# Patient Record
Sex: FEMALE | Race: WHITE | Employment: FULL TIME | URBAN - METROPOLITAN AREA
[De-identification: names, ages, dates, MRNs, and addresses within clinical notes are randomized per-mention and may not be internally consistent; named-entity substitution may affect disease eponyms.]

---

## 2017-06-28 LAB
AVERAGE GLUCOSE: NORMAL
BASOPHILS ABSOLUTE: NORMAL /ΜL
BASOPHILS RELATIVE PERCENT: NORMAL %
BILIRUBIN, URINE: NEGATIVE
BLOOD, URINE: NEGATIVE
CHOLESTEROL, TOTAL: 211 MG/DL
CHOLESTEROL/HDL RATIO: ABNORMAL
CLARITY: ABNORMAL
COLOR: ABNORMAL
EOSINOPHILS ABSOLUTE: NORMAL /ΜL
EOSINOPHILS RELATIVE PERCENT: NORMAL %
GLUCOSE BLD-MCNC: 97 MG/DL
GLUCOSE URINE: NEGATIVE
HBA1C MFR BLD: 5.6 %
HCT VFR BLD CALC: 44.8 % (ref 36–46)
HDLC SERPL-MCNC: 71 MG/DL (ref 35–70)
HEMOGLOBIN: 15.1 G/DL (ref 12–16)
KETONES, URINE: NEGATIVE
LDL CHOLESTEROL CALCULATED: 118 MG/DL (ref 0–160)
LEUKOCYTE ESTERASE, URINE: NEGATIVE
LYMPHOCYTES ABSOLUTE: NORMAL /ΜL
LYMPHOCYTES RELATIVE PERCENT: NORMAL %
MCH RBC QN AUTO: 29.9 PG
MCHC RBC AUTO-ENTMCNC: 33.7 G/DL
MCV RBC AUTO: 88.8 FL
MONOCYTES ABSOLUTE: NORMAL /ΜL
MONOCYTES RELATIVE PERCENT: NORMAL %
NEUTROPHILS ABSOLUTE: NORMAL /ΜL
NEUTROPHILS RELATIVE PERCENT: NORMAL %
NITRITE, URINE: NEGATIVE
PDW BLD-RTO: 13.9 %
PH UA: 5.5 (ref 4.5–8)
PLATELET # BLD: 228 K/ΜL
PMV BLD AUTO: NORMAL FL
PROTEIN UA: POSITIVE
RBC # BLD: 5 10^6/ΜL
SPECIFIC GRAVITY, URINE: 1.03
TRIGL SERPL-MCNC: 108 MG/DL
UROBILINOGEN, URINE: NORMAL
VLDLC SERPL CALC-MCNC: 22 MG/DL
WBC # BLD: 8.3 10^3/ML

## 2017-08-07 ENCOUNTER — TELEPHONE (OUTPATIENT)
Dept: INTERNAL MEDICINE CLINIC | Age: 47
End: 2017-08-07

## 2017-08-08 ENCOUNTER — OFFICE VISIT (OUTPATIENT)
Dept: INTERNAL MEDICINE CLINIC | Age: 47
End: 2017-08-08

## 2017-08-08 VITALS
RESPIRATION RATE: 16 BRPM | HEIGHT: 62 IN | WEIGHT: 208 LBS | SYSTOLIC BLOOD PRESSURE: 108 MMHG | DIASTOLIC BLOOD PRESSURE: 80 MMHG | HEART RATE: 80 BPM | BODY MASS INDEX: 38.28 KG/M2

## 2017-08-08 DIAGNOSIS — R10.31 RIGHT LOWER QUADRANT ABDOMINAL PAIN: Primary | ICD-10-CM

## 2017-08-08 DIAGNOSIS — E66.01 OBESITY, CLASS III, BMI 40-49.9 (MORBID OBESITY) (HCC): ICD-10-CM

## 2017-08-08 PROCEDURE — 99215 OFFICE O/P EST HI 40 MIN: CPT | Performed by: INTERNAL MEDICINE

## 2017-08-08 RX ORDER — MELOXICAM 15 MG/1
15 TABLET ORAL PRN
COMMUNITY
End: 2017-10-27 | Stop reason: ALTCHOICE

## 2017-08-08 ASSESSMENT — ENCOUNTER SYMPTOMS
HEARTBURN: 0
DIARRHEA: 1
SHORTNESS OF BREATH: 0
WHEEZING: 0
BLURRED VISION: 0
COUGH: 0
BLOOD IN STOOL: 0
SORE THROAT: 0
ABDOMINAL PAIN: 1
BACK PAIN: 1
DOUBLE VISION: 0
EYE REDNESS: 0
NAUSEA: 1
SPUTUM PRODUCTION: 0
VOMITING: 1

## 2017-08-16 ENCOUNTER — HOSPITAL ENCOUNTER (OUTPATIENT)
Dept: GENERAL RADIOLOGY | Age: 47
Discharge: HOME | End: 2017-08-16
Attending: INTERNAL MEDICINE | Admitting: INTERNAL MEDICINE

## 2017-08-16 LAB
ALBUMIN SERPL-MCNC: 4 GM/DL (ref 3.4–5)
ALP BLD-CCNC: 66 IU/L (ref 40–129)
ALT SERPL-CCNC: 18 U/L (ref 10–40)
AMYLASE: 95 U/L (ref 25–115)
ANION GAP SERPL CALCULATED.3IONS-SCNC: 9 MMOL/L (ref 4–16)
AST SERPL-CCNC: 15 IU/L (ref 15–37)
BILIRUB SERPL-MCNC: 0.5 MG/DL (ref 0–1)
BUN BLDV-MCNC: 13 MG/DL (ref 6–23)
CALCIUM SERPL-MCNC: 9.3 MG/DL (ref 8.3–10.6)
CHLORIDE BLD-SCNC: 104 MMOL/L (ref 99–110)
CO2: 27 MMOL/L (ref 21–32)
CREAT SERPL-MCNC: 0.8 MG/DL (ref 0.6–1.1)
GFR AFRICAN AMERICAN: >60 ML/MIN/1.73M2
GFR NON-AFRICAN AMERICAN: >60 ML/MIN/1.73M2
GLUCOSE BLD-MCNC: 94 MG/DL (ref 70–140)
LIPASE: 75 IU/L (ref 13–60)
POTASSIUM SERPL-SCNC: 5.4 MMOL/L (ref 3.5–5.1)
SODIUM BLD-SCNC: 140 MMOL/L (ref 135–145)
TOTAL PROTEIN: 6.4 GM/DL (ref 6.4–8.2)

## 2017-08-18 LAB — TRANSGLUTAMINASE IGA: 1

## 2017-08-23 ENCOUNTER — HOSPITAL ENCOUNTER (OUTPATIENT)
Dept: CT IMAGING | Age: 47
Discharge: HOME | End: 2017-08-23
Attending: INTERNAL MEDICINE | Admitting: INTERNAL MEDICINE

## 2017-08-23 DIAGNOSIS — R10.31 RIGHT LOWER QUADRANT PAIN: ICD-10-CM

## 2017-08-23 DIAGNOSIS — R10.31 RIGHT LOWER QUADRANT ABDOMINAL PAIN: ICD-10-CM

## 2017-08-23 DIAGNOSIS — R10.31 RLQ ABDOMINAL PAIN: ICD-10-CM

## 2017-08-28 ENCOUNTER — TELEPHONE (OUTPATIENT)
Dept: INTERNAL MEDICINE CLINIC | Age: 47
End: 2017-08-28

## 2017-08-29 ENCOUNTER — OFFICE VISIT (OUTPATIENT)
Dept: INTERNAL MEDICINE CLINIC | Age: 47
End: 2017-08-29

## 2017-08-29 VITALS
RESPIRATION RATE: 16 BRPM | BODY MASS INDEX: 38.59 KG/M2 | WEIGHT: 211 LBS | SYSTOLIC BLOOD PRESSURE: 138 MMHG | HEART RATE: 80 BPM | DIASTOLIC BLOOD PRESSURE: 80 MMHG

## 2017-08-29 DIAGNOSIS — R10.13 EPIGASTRIC PAIN: ICD-10-CM

## 2017-08-29 DIAGNOSIS — R74.8 ELEVATED LIPASE: ICD-10-CM

## 2017-08-29 DIAGNOSIS — R10.31 RIGHT LOWER QUADRANT ABDOMINAL PAIN: Primary | ICD-10-CM

## 2017-08-29 PROCEDURE — 99214 OFFICE O/P EST MOD 30 MIN: CPT | Performed by: INTERNAL MEDICINE

## 2017-10-20 ENCOUNTER — PAT TELEPHONE (OUTPATIENT)
Dept: SURGERY | Age: 47
End: 2017-10-20

## 2017-10-20 VITALS — HEIGHT: 61 IN | WEIGHT: 212 LBS | BODY MASS INDEX: 40.02 KG/M2

## 2017-10-20 NOTE — ANESTHESIA PRE-OP
to vaginal area    Ibuprofen      GI upset       Problem List:    Patient Active Problem List   Diagnosis Code    Osteoarthritis Knee M17.10    Asthma, intermittent J45.20    Acne vulgaris L70.0    Breast cancer screening Z12.31    Screening for cervical cancer Z12.4    Obesity, Class III, BMI 40-49.9 (morbid obesity) ([facility] [address] E66.01    Left lumbar radiculopathy M54.16       Past Medical History:        Diagnosis Date    Acne vulgaris     Asthma     Family history of breast cancer     mother and 11 females in her family with CA breast;    Former smoker     GERD (gastroesophageal reflux disease)     Left lumbar radiculopathy 2016    Obesity, Class III, BMI 40-49.9 [facility]     Osteoarthritis Knee     bilateral osteoarthritis of knees and patellofemoral-followed by Dr Lissa Pratt Paroxysmal tachycardia unspecified     with heart rate 145; (?AVNRT)    Prediabetes        Past Surgical History:        Procedure Laterality Date     SECTION      ENDOMETRIAL ABLATION      KNEE ARTHROSCOPY Left     LAPAROSCOPIC APPENDECTOMY  2013    NICOLE AND BSO  2011       Social History:    Social History   Substance Use Topics    Smoking status: Former Smoker     Packs/day: 0.25     Years: 20.00     Types: Cigarettes     Start date: 1985     Quit date: 2012    Smokeless tobacco: Never Used    Alcohol use No                                Counseling given: Not Answered      Vital Signs (Current): There were no vitals filed for this visit. BP Readings from Last 3 Encounters:   17 138/80   17 108/80   16 128/88       NPO Status:                                                                                 BMI:   Wt Readings from Last 3 Encounters:   17 211 lb (95.7 kg)   17 208 lb (94.3 kg)   16 241 lb 12.8 oz (109.7 kg)     There is no height or weight on file to calculate BMI.     Anesthesia Evaluation  Patient summary reviewed  Airway: Mallampati: III        Dental: normal exam         Pulmonary:normal exam    (+) asthma:                         ROS comment: Former Smoker - 5 pack years  Quit Smokin12       Cardiovascular:negative ROS    (+) dysrhythmias:,          Beta Blocker:  Not on Beta Blocker         Neuro/Psych:   {neg ROS              GI/Hepatic/Renal:   (+) GERD:,           Endo/Other: negative ROS                    Abdominal:           Vascular:                                    Anesthesia Plan      TIVA and MAC     ASA 3       Induction: intravenous. Anesthetic plan and risks discussed with patient.                     Yina Sutton CRNA   10/20/2017

## 2017-10-20 NOTE — PROGRESS NOTES
1. Hx of anesthesia complications? -- no  2. Hx of difficult airway/intubation? -- no  3. Hx of changed chest pain/CHF exacerbation in last 6 mo. ? -- no  4. Home O2 dependent? -- no  5. Able to climb one flight of stairs w/o SOB? -- yes  6. Recent COPD exacerbation or pneumonia/bronchitis that has been treated in last      month? --no       1. Do not eat or drink anything after 12 midnight (or____hours) unless instructed by your doctor prior to surgery. This includes no water, chewing gum or mints. 2. Follow your directions as prescribed by the doctor for your procedure and medications. 3.Check with your Doctor regarding stopping Plavix, Coumadin, Lovenox,Effient,Pradaxa,Xarelto, Fragmin or other blood thinners and follow their instructions. 4. Do not smoke, and do not drink any alcoholic beverages 24 hours prior to surgery. This includes NA Beer. 5. You may brush your teeth and gargle the morning of surgery. DO NOT SWALLOW WATER   6. You MUST make arrangements for a responsible adult to take you home after your surgery and be able to check on you every couple hours for the day. You will not be allowed to leave alone or drive yourself home. It is strongly suggested someone stay with you the first 24 hrs. Your surgery will be cancelled if you do not have a ride home. 7. Please wear simple, loose fitting clothing to the hospital.  Triny Vega not bring valuables (money, credit cards, checkbooks, etc.) Do not wear any makeup (including no eye makeup) or nail polish on your fingers or toes. 8. DO NOT wear any jewelry or piercings on day of surgery. All body piercing jewelry must be removed. 9. If you have dentures, they will be removed before going to the OR; we will provide you a container. If you wear contact lenses or glasses, they will be removed; please bring a case for them.              10. If you  have a Living Will and Durable Power of  for Healthcare, please bring in a

## 2017-10-23 ENCOUNTER — HOSPITAL ENCOUNTER (OUTPATIENT)
Dept: SURGERY | Age: 47
Discharge: HOME | End: 2017-10-23
Attending: INTERNAL MEDICINE | Admitting: INTERNAL MEDICINE

## 2017-10-23 VITALS
HEART RATE: 84 BPM | SYSTOLIC BLOOD PRESSURE: 126 MMHG | OXYGEN SATURATION: 98 % | WEIGHT: 213 LBS | RESPIRATION RATE: 16 BRPM | HEIGHT: 61 IN | TEMPERATURE: 97 F | BODY MASS INDEX: 40.22 KG/M2 | DIASTOLIC BLOOD PRESSURE: 91 MMHG

## 2017-10-23 RX ORDER — VITAMIN B COMPLEX
1 CAPSULE ORAL DAILY
COMMUNITY
End: 2018-10-05 | Stop reason: ALTCHOICE

## 2017-10-23 RX ORDER — SODIUM CHLORIDE, SODIUM LACTATE, POTASSIUM CHLORIDE, CALCIUM CHLORIDE 600; 310; 30; 20 MG/100ML; MG/100ML; MG/100ML; MG/100ML
INJECTION, SOLUTION INTRAVENOUS CONTINUOUS
Status: DISCONTINUED | OUTPATIENT
Start: 2017-10-23 | End: 2017-10-24 | Stop reason: HOSPADM

## 2017-10-23 RX ADMIN — SODIUM CHLORIDE, SODIUM LACTATE, POTASSIUM CHLORIDE, CALCIUM CHLORIDE: 600; 310; 30; 20 INJECTION, SOLUTION INTRAVENOUS at 09:21

## 2017-10-23 ASSESSMENT — PAIN SCALES - GENERAL
PAINLEVEL_OUTOF10: 0
PAINLEVEL_OUTOF10: 0

## 2017-10-23 ASSESSMENT — PAIN DESCRIPTION - DESCRIPTORS: DESCRIPTORS: ACHING

## 2017-10-23 ASSESSMENT — PAIN - FUNCTIONAL ASSESSMENT: PAIN_FUNCTIONAL_ASSESSMENT: 0-10

## 2017-10-23 NOTE — H&P
I have examined the patient immediately before the procedure and there is no change in the previous history or physical exam.There is no history of sleep apnea, snoring, or stridor. There has been no  previous adverse experience with sedation/anesthesia.   ASA Class: 2  AIRWAY Class: 2

## 2017-10-23 NOTE — PROGRESS NOTES
1022 denies pain or nausea, head of bed up. Call light in reach  1025 coffee provided  1038 denies needs  1048 dr Blanca Dee updated patient  [phone] ambulated to bathroom  21  discharge instructions reviewed. Verbalized understanding.  Discharged to car

## 2017-10-27 ENCOUNTER — TELEPHONE (OUTPATIENT)
Dept: INTERNAL MEDICINE CLINIC | Age: 47
End: 2017-10-27

## 2017-10-30 ENCOUNTER — OFFICE VISIT (OUTPATIENT)
Dept: INTERNAL MEDICINE CLINIC | Age: 47
End: 2017-10-30

## 2017-10-30 VITALS
RESPIRATION RATE: 16 BRPM | HEART RATE: 64 BPM | DIASTOLIC BLOOD PRESSURE: 82 MMHG | WEIGHT: 215 LBS | SYSTOLIC BLOOD PRESSURE: 118 MMHG | BODY MASS INDEX: 40.62 KG/M2

## 2017-10-30 DIAGNOSIS — R10.13 DYSPEPSIA: ICD-10-CM

## 2017-10-30 DIAGNOSIS — G47.9 SLEEP DISORDER: Primary | ICD-10-CM

## 2017-10-30 DIAGNOSIS — F41.8 DEPRESSION WITH ANXIETY: ICD-10-CM

## 2017-10-30 PROCEDURE — 99214 OFFICE O/P EST MOD 30 MIN: CPT | Performed by: INTERNAL MEDICINE

## 2017-10-30 RX ORDER — BUPROPION HYDROCHLORIDE 150 MG/1
150 TABLET ORAL EVERY MORNING
Qty: 10 TABLET | Refills: 1 | Status: SHIPPED | OUTPATIENT
Start: 2017-10-30 | End: 2018-03-05 | Stop reason: DRUGHIGH

## 2017-10-30 RX ORDER — BUPROPION HYDROCHLORIDE 300 MG/1
300 TABLET ORAL EVERY MORNING
Qty: 30 TABLET | Refills: 3 | Status: SHIPPED | OUTPATIENT
Start: 2017-10-30 | End: 2018-07-16

## 2017-10-30 NOTE — PROGRESS NOTES
patellofemoral-followed by Dr Gege Quevedo Paroxysmal tachycardia unspecified 2005    with heart rate 145; (?AVNRT)    Prediabetes 2015        Social History   Substance Use Topics    Smoking status: Current Some Day Smoker     Packs/day: 0.25     Years: 20.00     Types: Cigarettes     Start date: 7/14/1985     Last attempt to quit: 4/1/2012    Smokeless tobacco: Never Used    Alcohol use No      Comment: rare        ROS: The patient has had no headache, sore throat, fever or chills, cough, dyspnea, chest pain, nausea, vomiting or diarrhea, or edema. Objective:      /82   Pulse 64   Resp 16   Wt 215 lb (97.5 kg)   BMI 40.62 kg/m²    General: in no apparent distress   The patient's neck is free of nodes. Lungs are clear. Heart is normal in rate and regular in rhythm. Legs are free of edema. No rash or erythema. Aug lab rev'd: CT abd and US gb rev'd and unremarkable  EGD rev'd and normal  Has seen Simón        Assessment / Plan:      1. Sleep disorder    2. Depression with anxiety    3.  Dyspepsia        Hx of sexual SE with SSRI in past (lexapro)       Plan   Take wellbutrin  daily x 10 d  Then increase to 300 daily  Refer to Dr Anibal Isaac for depression with anxiety and sleep disorder  RTC 4 wk  Orders Placed This Encounter   Procedures    Ambulatory referral to Psychology

## 2017-10-30 NOTE — PATIENT INSTRUCTIONS
Patient Education          bupropion  Pronunciation:  byoo PRO pee on  Brand:  Aplenzin, Buproban, Forfivo XL, Wellbutrin SR, Wellbutrin XL, Zyban, Zyban Advantage Pack  What is the most important information I should know about bupropion? You should not take bupropion if you have seizures, an eating disorder, or if you have suddenly stopped using alcohol, seizure medication, or sedatives. If you take Wellbutrin for depression, do not also take Zyban to quit smoking. Do not use this medicine if you have used an MAO inhibitor in the past 14 days, such as isocarboxazid, linezolid, methylene blue injection, phenelzine, rasagiline, selegiline, or tranylcypromine. Some young people have thoughts about suicide when first taking an antidepressant. Your doctor will need to check your progress at regular visits. Report any new or worsening symptoms to your doctor, such as unusual mood or behavior changes. What is bupropion? Bupropion is an antidepressant medication used to treat major depressive disorder and seasonal affective disorder. The Zyban brand of bupropion is used to help people stop smoking by reducing cravings and other withdrawal effects. Bupropion may also be used for purposes not listed in this medication guide. What should I discuss with my healthcare provider before taking bupropion? You should not take bupropion if you are allergic to it, or if you have:  · epilepsy or a seizure disorder;  · an eating disorder such as anorexia or bulimia; or  · if you have suddenly stopped using alcohol, seizure medication, or a sedative (Valium, Nembutal, Seconal, Solfoton, and others). Do not take bupropion if you have used an MAO inhibitor in the past 14 days. A dangerous drug interaction could occur. MAO inhibitors include isocarboxazid, linezolid, methylene blue injection, phenelzine, rasagiline, selegiline, tranylcypromine, and others. Do not take bupropion to treat more than one condition at a time.  If you take Wellbutrin for depression, do not also take Zyban to quit smoking. Bupropion may cause seizures, especially in people who have certain medical conditions or use certain drugs. Tell your doctor about all of your medical conditions and the drugs you use. To make sure bupropion is safe for you, tell your doctor if you have:  · a history of head injury, seizures, or brain or spinal cord tumor;  · narrow-angle glaucoma;  · heart disease, high blood pressure, history of heart attack;  · diabetes;  · kidney or liver disease (especially cirrhosis);  · bipolar disorder (manic depression); or  · if you drink alcohol. Some young people have thoughts about suicide when first taking an antidepressant. Your doctor will need to check your progress at regular visits. Your family or other caregivers should also be alert to changes in your mood or symptoms. It is not known whether this medicine will harm an unborn baby. Tell your doctor if you are pregnant or plan to become pregnant. Bupropion can pass into breast milk and may harm a nursing baby. You should not breast-feed while using this medicine. Bupropion is not approved for use by anyone younger than 25years old. How should I take bupropion? Follow all directions on your prescription label. Do not take this medicine in larger or smaller amounts or for longer than recommended. Too much of this medicine can increase your risk of a seizure. You may take bupropion with or without food. Do not crush, chew, or break an extended-release tablet. Swallow it whole. You should not change your dose or stop using bupropion suddenly, unless you have a seizure while taking this medicine. Stopping suddenly can cause unpleasant withdrawal symptoms. Ask your doctor how to safely stop using bupropion. If you take Zyban to help you stop smoking, you may continue to smoke for about 1 week after you start the medicine.  Set a date to quit smoking during the second week of treatment. Talk to your doctor if you have trouble quitting after taking Zyban for 7 weeks. Your doctor may prescribe nicotine patches or gum to help you stop smoking. Do not smoke at any time if you are using a nicotine product along with Zyban. Too much nicotine can cause serious side effects. Read all patient information, medication guides, and instruction sheets provided to you. Ask your doctor or pharmacist if you have any questions. You may have nicotine withdrawal symptoms when you stop smoking, including: increased appetite, weight gain, trouble sleeping, trouble concentrating, slower heart rate, having the urge to smoke, and feeling anxious, restless, depressed, angry, frustrated, or irritated. These symptoms may occur with or without using medication such as Zyban. Smoking cessation may also cause new or worsening mental health problems, such as depression. Bupropion can cause you to have a false positive drug screening test. If you provide a urine sample for drug screening, tell the laboratory staff that you are taking bupropion. Store at room temperature away from moisture, heat, and light. What happens if I miss a dose? Take the missed dose as soon as you remember. Skip the missed dose if it is almost time for your next scheduled dose. Do not  take extra medicine to make up the missed dose. What happens if I overdose? Seek emergency medical attention or call the Poison Help line at 1-515.410.4850. An overdose of bupropion can be fatal.  Overdose symptoms may include muscle stiffness, hallucinations, fast or uneven heartbeat, shallow breathing, or fainting. What should I avoid while taking bupropion? Drinking alcohol with bupropion may increase your risk of seizures. If you drink alcohol regularly, talk with your doctor before changing the amount you drink. Bupropion can also cause seizures in a regular drinker who suddenly stops drinking at the start of treatment with bupropion.   Bupropion may impair your thinking or reactions. Be careful if you drive or do anything that requires you to be alert. What are the possible side effects of bupropion? Get emergency medical help if you have signs of an allergic reaction: hives, rash or itching; fever, swollen glands, joint pain, general ill feeling; difficult breathing; swelling of your face, lips, tongue, or throat. You may have changes in thinking or behavior while taking Zyban to help you quit smoking. If you have any of the following symptoms, stop taking Zyban and call your doctor right away:  · mood or behavior changes, unusual thoughts or feelings;  · feeling anxious, restless, or depressed;  · agitation, hostility, aggression;  · impulsive or risk-taking behavior;  · confusion, paranoia, hallucinations, panic; or  · thoughts about suicide or hurting yourself. Report any new or worsening symptoms to your doctor. Call your doctor at once if you have:  · a seizure (convulsions);  · a manic episode --racing thoughts, increased energy, reckless behavior, feeling extremely happy or irritable, talking more than usual, severe problems with sleep;  · blurred vision, tunnel vision, eye pain or swelling, or seeing halos around lights;  · fast heartbeats;  · severe skin reaction --fever, sore throat, swelling in your face or tongue, burning in your eyes, skin pain, followed by a red or purple skin rash that spreads (especially in the face or upper body) and causes blistering and peeling. Common side effects may include:  · dry mouth, stuffy nose;  · nausea, vomiting, constipation;  · feeling anxious, nervous, or shaky;  · headache, dizziness;  · sleep problems (insomnia);  · heavy sweating; or  · joint pain. This is not a complete list of side effects and others may occur. Call your doctor for medical advice about side effects. You may report side effects to FDA at 9-684-FDA-5584. What other drugs will affect bupropion?   You may have a higher risk of illegal drugs. They can increase your anxiety level and cause sleep problems. ¨ Learn and do relaxation techniques. See below for more about these techniques. · Engage your mind. Get out and do something you enjoy. Go to a funny movie, or take a walk or hike. Plan your day. Having too much or too little to do can make you anxious. · Keep a record of your symptoms. Discuss your fears with a good friend or family member, or join a support group for people with similar problems. Talking to others sometimes relieves stress. · Get involved in social groups, or volunteer to help others. Being alone sometimes makes things seem worse than they are. · Get at least 30 minutes of exercise on most days of the week to relieve stress. Walking is a good choice. You also may want to do other activities, such as running, swimming, cycling, or playing tennis or team sports. Relaxation techniques  Do relaxation exercises 10 to 20 minutes a day. You can play soothing, relaxing music while you do them, if you wish. · Tell others in your house that you are going to do your relaxation exercises. Ask them not to disturb you. · Find a comfortable place, away from all distractions and noise. · Lie down on your back, or sit with your back straight. · Focus on your breathing. Make it slow and steady. · Breathe in through your nose. Breathe out through either your nose or mouth. · Breathe deeply, filling up the area between your navel and your rib cage. Breathe so that your belly goes up and down. · Do not hold your breath. · Breathe like this for 5 to 10 minutes. Notice the feeling of calmness throughout your whole body. As you continue to breathe slowly and deeply, relax by doing the following for another 5 to 10 minutes:  · Tighten and relax each muscle group in your body. You can begin at your toes and work your way up to your head. · Imagine your muscle groups relaxing and becoming heavy.   · Empty your mind of all Instructions  Depression is a condition that affects the way you feel, think, and act. It causes symptoms such as low energy, loss of interest in daily activities, and sadness or grouchiness that goes on for a long time. Depression is very common and affects men and women of all ages. Depression is a medical illness caused by changes in the natural chemicals in your brain. It is not a character flaw, and it does not mean that you are a bad or weak person. It does not mean that you are going crazy. It is important to know that depression can be treated. Medicines, counseling, and self-care can all help. Many people do not get help because they are embarrassed or think that they will get over the depression on their own. But some people do not get better without treatment. Follow-up care is a key part of your treatment and safety. Be sure to make and go to all appointments, and call your doctor if you are having problems. It's also a good idea to know your test results and keep a list of the medicines you take. How can you care for yourself at home? Learn about antidepressant medicines  Antidepressant medicines can improve or end the symptoms of depression. You may need to take the medicine for at least 6 months, and often longer. Keep taking your medicine even if you feel better. If you stop taking it too soon, your symptoms may come back or get worse. You may start to feel better within 1 to 3 weeks of taking antidepressant medicine. But it can take as many as 6 to 8 weeks to see more improvement. Talk to your doctor if you have problems with your medicine or if you do not notice any improvement after 3 weeks. Antidepressants can make you feel tired, dizzy, or nervous. Some people have dry mouth, constipation, headaches, sexual problems, an upset stomach, or diarrhea. Many of these side effects are mild and go away on their own after you take the medicine for a few weeks. Some may last longer.  Talk to your doctor if side effects bother you too much. You might be able to try a different medicine. If you are pregnant or breastfeeding, talk to your doctor about what medicines you can take. Learn about counseling  In many cases, counseling can work as well as medicines to treat mild to moderate depression. Counseling is done by licensed mental health providers, such as psychologists, social workers, and some types of nurses. It can be done in one-on-one sessions or in a group setting. Many people find group sessions helpful. Cognitive-behavioral therapy is a type of counseling. In this treatment therapy, you learn how to see and change unhelpful thinking styles that may be adding to your depression. Counseling and medicines often work well when used together. To manage depression  · Be physically active. Getting 30 minutes of exercise each day is good for your body and your mind. Begin slowly if it is hard for you to get started. If you already exercise, keep it up. · Plan something pleasant for yourself every day. Include activities that you have enjoyed in the past.  · Get enough sleep. Talk to your doctor if you have problems sleeping. · Eat a balanced diet. If you do not feel hungry, eat small snacks rather than large meals. · Do not drink alcohol, use illegal drugs, or take medicines that your doctor has not prescribed for you. They may interfere with your treatment. · Spend time with family and friends. It may help to speak openly about your depression with people you trust.  · Take your medicines exactly as prescribed. Call your doctor if you think you are having a problem with your medicine. · Do not make major life decisions while you are depressed. Depression may change the way you think. You will be able to make better decisions after you feel better. · Think positively. Challenge negative thoughts with statements such as \"I am hopeful\"; \"Things will get better\"; and \"I can ask for the help I need. \"

## 2017-11-01 PROBLEM — R22.41 MASS OF RIGHT THIGH: Status: ACTIVE | Noted: 2017-11-01

## 2017-11-01 PROBLEM — L72.3 SEBACEOUS CYST: Status: ACTIVE | Noted: 2017-11-01

## 2017-11-15 ENCOUNTER — TELEPHONE (OUTPATIENT)
Dept: PSYCHOLOGY | Age: 47
End: 2017-11-15

## 2017-12-04 ENCOUNTER — OFFICE VISIT (OUTPATIENT)
Dept: INTERNAL MEDICINE CLINIC | Age: 47
End: 2017-12-04

## 2017-12-04 VITALS
WEIGHT: 218 LBS | RESPIRATION RATE: 16 BRPM | BODY MASS INDEX: 41.19 KG/M2 | DIASTOLIC BLOOD PRESSURE: 80 MMHG | SYSTOLIC BLOOD PRESSURE: 112 MMHG | HEART RATE: 68 BPM

## 2017-12-04 DIAGNOSIS — F41.8 DEPRESSION WITH ANXIETY: ICD-10-CM

## 2017-12-04 DIAGNOSIS — R10.13 EPIGASTRIC PAIN: Primary | ICD-10-CM

## 2017-12-04 PROCEDURE — 99213 OFFICE O/P EST LOW 20 MIN: CPT | Performed by: INTERNAL MEDICINE

## 2017-12-20 PROBLEM — L72.9 SCALP CYST: Status: ACTIVE | Noted: 2017-12-20

## 2018-03-05 ENCOUNTER — OFFICE VISIT (OUTPATIENT)
Dept: INTERNAL MEDICINE CLINIC | Age: 48
End: 2018-03-05

## 2018-03-05 VITALS
BODY MASS INDEX: 42.59 KG/M2 | WEIGHT: 225.4 LBS | SYSTOLIC BLOOD PRESSURE: 128 MMHG | DIASTOLIC BLOOD PRESSURE: 82 MMHG | HEART RATE: 78 BPM | RESPIRATION RATE: 16 BRPM

## 2018-03-05 DIAGNOSIS — E66.01 OBESITY, CLASS III, BMI 40-49.9 (MORBID OBESITY) (HCC): ICD-10-CM

## 2018-03-05 DIAGNOSIS — F32.A ANXIETY AND DEPRESSION: Primary | ICD-10-CM

## 2018-03-05 DIAGNOSIS — F17.200 SMOKER: ICD-10-CM

## 2018-03-05 DIAGNOSIS — F41.9 ANXIETY AND DEPRESSION: Primary | ICD-10-CM

## 2018-03-05 PROBLEM — L72.9 SCALP CYST: Status: RESOLVED | Noted: 2017-12-20 | Resolved: 2018-03-05

## 2018-03-05 PROBLEM — R22.41 MASS OF RIGHT THIGH: Status: RESOLVED | Noted: 2017-11-01 | Resolved: 2018-03-05

## 2018-03-05 PROBLEM — L72.3 SEBACEOUS CYST: Status: RESOLVED | Noted: 2017-11-01 | Resolved: 2018-03-05

## 2018-03-05 PROCEDURE — 99213 OFFICE O/P EST LOW 20 MIN: CPT | Performed by: INTERNAL MEDICINE

## 2018-03-05 RX ORDER — BUSPIRONE HYDROCHLORIDE 10 MG/1
10 TABLET ORAL 3 TIMES DAILY
Qty: 90 TABLET | Refills: 1 | Status: SHIPPED | OUTPATIENT
Start: 2018-03-05 | End: 2018-07-16

## 2018-03-05 NOTE — PROGRESS NOTES
Subjective:      Leda Ramirez is a 52 y.o. female who presents today for follow up on her chronic medical conditions as noted below. Patient Active Problem List:     Osteoarthritis Knee     Asthma, intermittent     Acne vulgaris     Breast cancer screening     Screening for cervical cancer     Obesity, Class III, BMI 40-49.9 (morbid obesity) ([facility])     Left lumbar radiculopathy     Sebaceous cyst     Mass of right thigh     Scalp cyst     She was last seen in Dec for anxiety and depression   Was given wellbutrin and referred to Jeison Sheikh    She hasn't taken wellbutrin regularly    She has days she feels like sitting in a corner and crying  Feels like she is getting really bitchy at times  The \"me too\" movement has brought up unpleasant issues from the past  She isn't as bad as she was     She resists being \"labelled\" depressed and doesn't like or want to take meds    Denies asthma sx or hx  Back isn't bothersome    Discussed diet and exericise  Has EFX and uses it repeatedly 2 min at a time    Is still smoking ; counseled to stop; not ready  Smokes very little;  smoke    Current Outpatient Prescriptions   Medication Sig Dispense Refill    busPIRone (BUSPAR) 10 MG tablet Take 1 tablet by mouth 3 times daily 90 tablet 1    buPROPion (WELLBUTRIN XL) 300 MG extended release tablet Take 1 tablet by mouth every morning 30 tablet 3    b complex vitamins capsule Take 1 capsule by mouth daily      Multiple Vitamins-Minerals (HAIR SKIN AND NAILS FORMULA) TABS Take 1 tablet by mouth daily       No current facility-administered medications for this visit.           Past Medical History:   Diagnosis Date    Acne vulgaris     Family history of breast cancer     mother and 5 females in her family with CA breast;    GERD (gastroesophageal reflux disease)     Left lumbar radiculopathy 9/2016    Obesity, Class III, BMI 40-49.9 (morbid obesity) ([facility] [address]     Osteoarthritis Knee     bilateral osteoarthritis of knees and patellofemoral-followed by Dr Dimitri Stokes Paroxysmal tachycardia unspecified 2005    with heart rate 145; (?AVNRT)    Prediabetes 2015    Smoker         Social History   Substance Use Topics    Smoking status: Current Some Day Smoker     Packs/day: 0.25     Years: 30.00     Types: Cigarettes     Start date: 7/14/1985     Last attempt to quit: 4/1/2012    Smokeless tobacco: Never Used    Alcohol use No      Comment: rare        ROS: The patient has had no headache, sore throat, fever or chills, cough, dyspnea, chest pain, nausea, vomiting or diarrhea, or edema. Objective:      /82   Pulse 78   Resp 16   Wt 225 lb 6.4 oz (102.2 kg)   BMI 42.59 kg/m²    General: in no apparent distress   The patient's neck is free of nodes. Lungs are clear. Heart is normal in rate and regular in rhythm. Legs are free of edema. No rash or erythema. Assessment / Plan:      1. Anxiety and depression    2.  Obesity, Class III, BMI 40-49.9 (morbid obesity) ([address]    3. Smoker            Plan   Stop cigs  wllburtine dialy every day  Add buspar bid if able  Diet and exerfcse  RTC 6 wk

## 2018-04-16 ENCOUNTER — OFFICE VISIT (OUTPATIENT)
Dept: INTERNAL MEDICINE CLINIC | Age: 48
End: 2018-04-16

## 2018-04-16 VITALS
SYSTOLIC BLOOD PRESSURE: 132 MMHG | BODY MASS INDEX: 44.02 KG/M2 | RESPIRATION RATE: 16 BRPM | DIASTOLIC BLOOD PRESSURE: 90 MMHG | WEIGHT: 233 LBS | HEART RATE: 80 BPM

## 2018-04-16 DIAGNOSIS — F17.200 SMOKER: ICD-10-CM

## 2018-04-16 DIAGNOSIS — E66.01 OBESITY, CLASS III, BMI 40-49.9 (MORBID OBESITY) (HCC): Primary | ICD-10-CM

## 2018-04-16 DIAGNOSIS — F41.9 ANXIETY AND DEPRESSION: ICD-10-CM

## 2018-04-16 DIAGNOSIS — F32.A ANXIETY AND DEPRESSION: ICD-10-CM

## 2018-04-16 PROCEDURE — 99213 OFFICE O/P EST LOW 20 MIN: CPT | Performed by: INTERNAL MEDICINE

## 2018-04-16 RX ORDER — COVID-19 ANTIGEN TEST
KIT MISCELLANEOUS PRN
COMMUNITY
End: 2019-12-09

## 2018-07-16 ENCOUNTER — OFFICE VISIT (OUTPATIENT)
Dept: INTERNAL MEDICINE CLINIC | Age: 48
End: 2018-07-16

## 2018-07-16 VITALS
BODY MASS INDEX: 43.27 KG/M2 | OXYGEN SATURATION: 100 % | DIASTOLIC BLOOD PRESSURE: 80 MMHG | HEART RATE: 74 BPM | WEIGHT: 229 LBS | RESPIRATION RATE: 16 BRPM | SYSTOLIC BLOOD PRESSURE: 138 MMHG

## 2018-07-16 DIAGNOSIS — E78.2 MIXED HYPERLIPIDEMIA: ICD-10-CM

## 2018-07-16 DIAGNOSIS — M54.16 LEFT LUMBAR RADICULOPATHY: Primary | ICD-10-CM

## 2018-07-16 DIAGNOSIS — R53.83 FATIGUE, UNSPECIFIED TYPE: ICD-10-CM

## 2018-07-16 DIAGNOSIS — Z12.39 BREAST CANCER SCREENING: ICD-10-CM

## 2018-07-16 DIAGNOSIS — Z13.31 POSITIVE DEPRESSION SCREENING: ICD-10-CM

## 2018-07-16 DIAGNOSIS — E66.01 OBESITY, CLASS III, BMI 40-49.9 (MORBID OBESITY) (HCC): ICD-10-CM

## 2018-07-16 PROCEDURE — G0444 DEPRESSION SCREEN ANNUAL: HCPCS | Performed by: INTERNAL MEDICINE

## 2018-07-16 PROCEDURE — 99213 OFFICE O/P EST LOW 20 MIN: CPT | Performed by: INTERNAL MEDICINE

## 2018-07-16 PROCEDURE — G8431 POS CLIN DEPRES SCRN F/U DOC: HCPCS | Performed by: INTERNAL MEDICINE

## 2018-07-16 ASSESSMENT — PATIENT HEALTH QUESTIONNAIRE - PHQ9
5. POOR APPETITE OR OVEREATING: 3
SUM OF ALL RESPONSES TO PHQ9 QUESTIONS 1 & 2: 5
9. THOUGHTS THAT YOU WOULD BE BETTER OFF DEAD, OR OF HURTING YOURSELF: 0
7. TROUBLE CONCENTRATING ON THINGS, SUCH AS READING THE NEWSPAPER OR WATCHING TELEVISION: 1
10. IF YOU CHECKED OFF ANY PROBLEMS, HOW DIFFICULT HAVE THESE PROBLEMS MADE IT FOR YOU TO DO YOUR WORK, TAKE CARE OF THINGS AT HOME, OR GET ALONG WITH OTHER PEOPLE: 1
SUM OF ALL RESPONSES TO PHQ QUESTIONS 1-9: 14
6. FEELING BAD ABOUT YOURSELF - OR THAT YOU ARE A FAILURE OR HAVE LET YOURSELF OR YOUR FAMILY DOWN: 1
2. FEELING DOWN, DEPRESSED OR HOPELESS: 3
3. TROUBLE FALLING OR STAYING ASLEEP: 1
8. MOVING OR SPEAKING SO SLOWLY THAT OTHER PEOPLE COULD HAVE NOTICED. OR THE OPPOSITE, BEING SO FIGETY OR RESTLESS THAT YOU HAVE BEEN MOVING AROUND A LOT MORE THAN USUAL: 0
1. LITTLE INTEREST OR PLEASURE IN DOING THINGS: 2
4. FEELING TIRED OR HAVING LITTLE ENERGY: 3

## 2018-10-03 ENCOUNTER — TELEPHONE (OUTPATIENT)
Dept: INTERNAL MEDICINE CLINIC | Age: 48
End: 2018-10-03

## 2018-10-03 ENCOUNTER — OFFICE VISIT (OUTPATIENT)
Dept: FAMILY MEDICINE CLINIC | Age: 48
End: 2018-10-03
Payer: COMMERCIAL

## 2018-10-03 VITALS
HEART RATE: 97 BPM | WEIGHT: 239.2 LBS | OXYGEN SATURATION: 98 % | BODY MASS INDEX: 45.2 KG/M2 | SYSTOLIC BLOOD PRESSURE: 118 MMHG | DIASTOLIC BLOOD PRESSURE: 80 MMHG

## 2018-10-03 DIAGNOSIS — B02.9 HERPES ZOSTER WITHOUT COMPLICATION: Primary | ICD-10-CM

## 2018-10-03 PROCEDURE — 99213 OFFICE O/P EST LOW 20 MIN: CPT | Performed by: NURSE PRACTITIONER

## 2018-10-03 RX ORDER — ACYCLOVIR 800 MG/1
800 TABLET ORAL
Qty: 35 TABLET | Refills: 0 | Status: SHIPPED | OUTPATIENT
Start: 2018-10-03 | End: 2018-10-10

## 2018-10-03 RX ORDER — ACYCLOVIR 800 MG/1
800 TABLET ORAL
Qty: 35 TABLET | Refills: 0 | Status: SHIPPED | OUTPATIENT
Start: 2018-10-03 | End: 2018-10-03 | Stop reason: SDUPTHER

## 2018-10-03 RX ORDER — PREDNISONE 20 MG/1
TABLET ORAL
Qty: 11 TABLET | Refills: 0 | Status: SHIPPED | OUTPATIENT
Start: 2018-10-03 | End: 2018-10-03 | Stop reason: SDUPTHER

## 2018-10-03 RX ORDER — PREDNISONE 20 MG/1
TABLET ORAL
Qty: 11 TABLET | Refills: 0 | Status: SHIPPED | OUTPATIENT
Start: 2018-10-03 | End: 2018-10-19 | Stop reason: ALTCHOICE

## 2018-10-03 NOTE — PROGRESS NOTES
symptoms worsen or fail to improve. Patient Instructions   Start acyclovir and prednisone. Keep lesions covered. Follow-up as needed. Patient Education        Stopping Smoking: Care Instructions  Your Care Instructions  Cigarette smokers crave the nicotine in cigarettes. Giving it up is much harder than simply changing a habit. Your body has to stop craving the nicotine. It is hard to quit, but you can do it. There are many tools that people use to quit smoking. You may find that combining tools works best for you. There are several steps to quitting. First you get ready to quit. Then you get support to help you. After that, you learn new skills and behaviors to become a nonsmoker. For many people, a necessary step is getting and using medicine. Your doctor will help you set up the plan that best meets your needs. You may want to attend a smoking cessation program to help you quit smoking. When you choose a program, look for one that has proven success. Ask your doctor for ideas. You will greatly increase your chances of success if you take medicine as well as get counseling or join a cessation program.  Some of the changes you feel when you first quit tobacco are uncomfortable. Your body will miss the nicotine at first, and you may feel short-tempered and grumpy. You may have trouble sleeping or concentrating. Medicine can help you deal with these symptoms. You may struggle with changing your smoking habits and rituals. The last step is the tricky one: Be prepared for the smoking urge to continue for a time. This is a lot to deal with, but keep at it. You will feel better. Follow-up care is a key part of your treatment and safety. Be sure to make and go to all appointments, and call your doctor if you are having problems. It's also a good idea to know your test results and keep a list of the medicines you take. How can you care for yourself at home?   · Ask your family, friends, and coworkers for ending the smoking habit. · Eat a healthy diet and get regular exercise. Having healthy habits will help your body move past its craving for nicotine. · Be prepared to keep trying. Most people are not successful the first few times they try to quit. Do not get mad at yourself if you smoke again. Make a list of things you learned and think about when you want to try again, such as next week, next month, or next year. Where can you learn more? Go to [email or web address]://[employer].[email or web address] [email or web address] org and sign in to your MESoft account. Enter J786 in the Musiwave box to learn more about \"Stopping Smoking: Care Instructions. \"     If you do not have an account, please click on the \"Sign Up Now\" link. Current as of: November 29, 2017  Content Version: 11.7  © 8907-7807 [employer]. Care instructions adapted under license by [facility] ([facility]). If you have questions about a medical condition or this instruction, always ask your healthcare professional. [place] [address] any warranty or liability for your use of this information.            Controlled Substances Monitoring:

## 2018-10-03 NOTE — PATIENT INSTRUCTIONS
exercise. Having healthy habits will help your body move past its craving for nicotine. · Be prepared to keep trying. Most people are not successful the first few times they try to quit. Do not get mad at yourself if you smoke again. Make a list of things you learned and think about when you want to try again, such as next week, next month, or next year. Where can you learn more? Go to https://[employer].Kabooza. org and sign in to your Emergency CallWorks account. Enter U269 in the BlueSnap box to learn more about \"Stopping Smoking: Care Instructions. \"     If you do not have an account, please click on the \"Sign Up Now\" link. Current as of: November 29, 2017  Content Version: 11.7  © 8050-4199 [employer]. Care instructions adapted under license by [facility] ([facility]). If you have questions about a medical condition or this instruction, always ask your healthcare professional. [place] [address] any warranty or liability for your use of this information.

## 2018-10-05 ENCOUNTER — OFFICE VISIT (OUTPATIENT)
Dept: INTERNAL MEDICINE CLINIC | Age: 48
End: 2018-10-05
Payer: COMMERCIAL

## 2018-10-05 VITALS
DIASTOLIC BLOOD PRESSURE: 70 MMHG | WEIGHT: 237.2 LBS | BODY MASS INDEX: 44.82 KG/M2 | SYSTOLIC BLOOD PRESSURE: 112 MMHG | OXYGEN SATURATION: 97 % | HEART RATE: 87 BPM | RESPIRATION RATE: 16 BRPM

## 2018-10-05 DIAGNOSIS — E78.2 MIXED HYPERLIPIDEMIA: ICD-10-CM

## 2018-10-05 DIAGNOSIS — E66.01 OBESITY, CLASS III, BMI 40-49.9 (MORBID OBESITY) (HCC): ICD-10-CM

## 2018-10-05 DIAGNOSIS — R53.83 FATIGUE, UNSPECIFIED TYPE: ICD-10-CM

## 2018-10-05 DIAGNOSIS — E53.8 VITAMIN B12 DEFICIENCY: ICD-10-CM

## 2018-10-05 DIAGNOSIS — B02.23 POST-HERPETIC POLYNEUROPATHY: Primary | ICD-10-CM

## 2018-10-05 DIAGNOSIS — B02.23 POST-HERPETIC POLYNEUROPATHY: ICD-10-CM

## 2018-10-05 LAB
A/G RATIO: 1.9 (ref 1.1–2.2)
ALBUMIN SERPL-MCNC: 4.3 G/DL (ref 3.4–5)
ALP BLD-CCNC: 73 U/L (ref 40–129)
ALT SERPL-CCNC: 32 U/L (ref 10–40)
ANION GAP SERPL CALCULATED.3IONS-SCNC: 12 MMOL/L (ref 3–16)
AST SERPL-CCNC: 21 U/L (ref 15–37)
BASOPHILS ABSOLUTE: 0.1 K/UL (ref 0–0.2)
BASOPHILS RELATIVE PERCENT: 0.6 %
BILIRUB SERPL-MCNC: 0.3 MG/DL (ref 0–1)
BUN BLDV-MCNC: 14 MG/DL (ref 7–20)
CALCIUM SERPL-MCNC: 9.4 MG/DL (ref 8.3–10.6)
CHLORIDE BLD-SCNC: 103 MMOL/L (ref 99–110)
CHOLESTEROL, TOTAL: 190 MG/DL (ref 0–199)
CO2: 27 MMOL/L (ref 21–32)
CREAT SERPL-MCNC: 0.8 MG/DL (ref 0.6–1.1)
EOSINOPHILS ABSOLUTE: 0.1 K/UL (ref 0–0.6)
EOSINOPHILS RELATIVE PERCENT: 1.2 %
GFR AFRICAN AMERICAN: >60
GFR NON-AFRICAN AMERICAN: >60
GLOBULIN: 2.3 G/DL
GLUCOSE BLD-MCNC: 102 MG/DL (ref 70–99)
HCT VFR BLD CALC: 44.1 % (ref 36–48)
HDLC SERPL-MCNC: 57 MG/DL (ref 40–60)
HEMOGLOBIN: 14.8 G/DL (ref 12–16)
LDL CHOLESTEROL CALCULATED: 106 MG/DL
LYMPHOCYTES ABSOLUTE: 2.9 K/UL (ref 1–5.1)
LYMPHOCYTES RELATIVE PERCENT: 31.1 %
MCH RBC QN AUTO: 30.4 PG (ref 26–34)
MCHC RBC AUTO-ENTMCNC: 33.5 G/DL (ref 31–36)
MCV RBC AUTO: 90.8 FL (ref 80–100)
MONOCYTES ABSOLUTE: 0.6 K/UL (ref 0–1.3)
MONOCYTES RELATIVE PERCENT: 6.3 %
NEUTROPHILS ABSOLUTE: 5.7 K/UL (ref 1.7–7.7)
NEUTROPHILS RELATIVE PERCENT: 60.8 %
PDW BLD-RTO: 14.2 % (ref 12.4–15.4)
PLATELET # BLD: 249 K/UL (ref 135–450)
PMV BLD AUTO: 9 FL (ref 5–10.5)
POTASSIUM SERPL-SCNC: 4.6 MMOL/L (ref 3.5–5.1)
RBC # BLD: 4.86 M/UL (ref 4–5.2)
SODIUM BLD-SCNC: 142 MMOL/L (ref 136–145)
T4 FREE: 1.2 NG/DL (ref 0.9–1.8)
TOTAL PROTEIN: 6.6 G/DL (ref 6.4–8.2)
TRIGL SERPL-MCNC: 136 MG/DL (ref 0–150)
TSH SERPL DL<=0.05 MIU/L-ACNC: 2.01 UIU/ML (ref 0.27–4.2)
VITAMIN B-12: 1758 PG/ML (ref 211–911)
VLDLC SERPL CALC-MCNC: 27 MG/DL
WBC # BLD: 9.3 K/UL (ref 4–11)

## 2018-10-05 PROCEDURE — 99213 OFFICE O/P EST LOW 20 MIN: CPT | Performed by: INTERNAL MEDICINE

## 2018-10-05 RX ORDER — GABAPENTIN 300 MG/1
300 CAPSULE ORAL 3 TIMES DAILY
Qty: 90 CAPSULE | Refills: 0 | Status: SHIPPED | OUTPATIENT
Start: 2018-10-05 | End: 2018-11-21 | Stop reason: ALTCHOICE

## 2018-10-05 NOTE — PROGRESS NOTES
Types: Cigarettes     Start date: 7/14/1985    Smokeless tobacco: Never Used    Alcohol use No      Comment: rare        ROS: The patient has had no headache, sore throat, fever or chills, cough, dyspnea, chest pain, nausea, vomiting or diarrhea, or edema. Objective:      /70   Pulse 87   Resp 16   Wt 237 lb 3.2 oz (107.6 kg)   SpO2 97%   BMI 44.82 kg/m²    General: in no apparent distress   The patient's neck is free of nodes. Lungs are clear. Heart is normal in rate and regular in rhythm. Legs are free of edema. Several Clusters of grouped vesicles in C6-7 distribution on left       Assessment / Plan:      1. Post-herpetic polyneuropathy    2. Fatigue, unspecified type    3. Obesity, Class III, BMI 40-49.9 (morbid obesity) ([address]    4. Vitamin B12 deficiency    5.  Mixed hyperlipidemia            Plan   Get fasting labs  Shingles / neuropathy teaching  Complete tx  addneurontin 300 tid  RTC 2 wk, lb first  Orders Placed This Encounter   Procedures    CBC Auto Differential    Comprehensive Metabolic Panel    Lipid Panel    TSH without Reflex    Vitamin B12    T4, Free

## 2018-10-19 ENCOUNTER — OFFICE VISIT (OUTPATIENT)
Dept: INTERNAL MEDICINE CLINIC | Age: 48
End: 2018-10-19
Payer: COMMERCIAL

## 2018-10-19 VITALS
WEIGHT: 239 LBS | RESPIRATION RATE: 16 BRPM | BODY MASS INDEX: 45.16 KG/M2 | SYSTOLIC BLOOD PRESSURE: 116 MMHG | DIASTOLIC BLOOD PRESSURE: 70 MMHG | HEART RATE: 106 BPM | OXYGEN SATURATION: 98 %

## 2018-10-19 DIAGNOSIS — E66.01 OBESITY, CLASS III, BMI 40-49.9 (MORBID OBESITY) (HCC): ICD-10-CM

## 2018-10-19 DIAGNOSIS — F17.200 SMOKER: ICD-10-CM

## 2018-10-19 DIAGNOSIS — B02.9 HERPES ZOSTER WITHOUT COMPLICATION: Primary | ICD-10-CM

## 2018-10-19 DIAGNOSIS — B02.29 POST HERPETIC NEURALGIA: ICD-10-CM

## 2018-10-19 DIAGNOSIS — F33.1 MODERATE EPISODE OF RECURRENT MAJOR DEPRESSIVE DISORDER (HCC): ICD-10-CM

## 2018-10-19 PROCEDURE — 99214 OFFICE O/P EST MOD 30 MIN: CPT | Performed by: INTERNAL MEDICINE

## 2018-10-19 RX ORDER — DULOXETIN HYDROCHLORIDE 60 MG/1
60 CAPSULE, DELAYED RELEASE ORAL DAILY
Qty: 30 CAPSULE | Refills: 3 | Status: SHIPPED | OUTPATIENT
Start: 2018-10-19 | End: 2019-01-21 | Stop reason: SINTOL

## 2018-10-19 RX ORDER — LIDOCAINE 50 MG/G
2 PATCH TOPICAL DAILY
Qty: 60 PATCH | Refills: 1 | Status: SHIPPED | OUTPATIENT
Start: 2018-10-19 | End: 2018-11-21 | Stop reason: ALTCHOICE

## 2018-10-19 RX ORDER — DULOXETIN HYDROCHLORIDE 30 MG/1
30 CAPSULE, DELAYED RELEASE ORAL DAILY
Qty: 10 CAPSULE | Refills: 0 | Status: SHIPPED | OUTPATIENT
Start: 2018-10-19 | End: 2019-01-21 | Stop reason: ALTCHOICE

## 2018-10-20 ASSESSMENT — ENCOUNTER SYMPTOMS
RHINORRHEA: 0
SORE THROAT: 0
CHEST TIGHTNESS: 0
DIARRHEA: 0
EYES NEGATIVE: 1
COUGH: 0
TROUBLE SWALLOWING: 0
BACK PAIN: 1
NAUSEA: 0
NAIL CHANGES: 0
VOMITING: 0
SINUS PRESSURE: 0
WHEEZING: 0
SHORTNESS OF BREATH: 0
ABDOMINAL PAIN: 0

## 2018-11-21 ENCOUNTER — OFFICE VISIT (OUTPATIENT)
Dept: INTERNAL MEDICINE CLINIC | Age: 48
End: 2018-11-21
Payer: COMMERCIAL

## 2018-11-21 VITALS
WEIGHT: 238 LBS | HEART RATE: 110 BPM | BODY MASS INDEX: 44.97 KG/M2 | OXYGEN SATURATION: 99 % | DIASTOLIC BLOOD PRESSURE: 80 MMHG | SYSTOLIC BLOOD PRESSURE: 124 MMHG | RESPIRATION RATE: 16 BRPM

## 2018-11-21 DIAGNOSIS — B02.29 POSTHERPETIC NEURALGIA: ICD-10-CM

## 2018-11-21 DIAGNOSIS — F33.1 MODERATE EPISODE OF RECURRENT MAJOR DEPRESSIVE DISORDER (HCC): Primary | ICD-10-CM

## 2018-11-21 DIAGNOSIS — F17.200 SMOKER: ICD-10-CM

## 2018-11-21 DIAGNOSIS — E66.01 OBESITY, CLASS III, BMI 40-49.9 (MORBID OBESITY) (HCC): ICD-10-CM

## 2018-11-21 PROCEDURE — 99213 OFFICE O/P EST LOW 20 MIN: CPT | Performed by: INTERNAL MEDICINE

## 2018-11-21 NOTE — PROGRESS NOTES
Subjective:      Long Garcia is a 50 y.o. female who presents today for follow up on her chronic medical conditions as noted below. Patient Active Problem List:     Osteoarthritis Knee     Acne vulgaris     Breast cancer screening     Screening for cervical cancer     Obesity, Class III, BMI 40-49.9 (morbid obesity) ([facility])     Left lumbar radiculopathy     Smoker     Anxiety and depression     She was last seen 10/19 after shingles and with worsening depression    She is still emotionally fragile  I counseled her to stop cigs, she only had 3 cig yesterday    Also, gave her cymbalta, but she hasn't started it yet  She will start it now   thinks she is bipolar  She agrees to see Dr Mariusz Jenkins for help seeing if she is bipolar and  for depression and wgt loss and cigs    Gave her lidoderm patches for neuralgia  The neuralgia has resolved and she no longer uses the meds    She had mammogram abnormal and had US and needs 6 mo f/u mammogram  She understands this and it's due in May    wgt up 20 lb in a year and counseled on this and she has a plan  Her ins will no longer cover bariatric surg next yr    Current Outpatient Prescriptions   Medication Sig Dispense Refill    DULoxetine (CYMBALTA) 30 MG extended release capsule Take 1 capsule by mouth daily 10 capsule 0    DULoxetine (CYMBALTA) 60 MG extended release capsule Take 1 capsule by mouth daily 30 capsule 3    Multiple Vitamins-Minerals (HAIR SKIN NAILS PO) Take by mouth      Naproxen Sodium (ALEVE) 220 MG CAPS Take by mouth as needed for Pain       No current facility-administered medications for this visit.         Past Medical History:   Diagnosis Date    Acne vulgaris     Anxiety and depression 2018    Family history of breast cancer     mother and 5 females in her family with CA breast;    GERD (gastroesophageal reflux disease)     Left lumbar radiculopathy 9/2016    Obesity, Class III, BMI 40-49.9 (morbid obesity) [facility]    

## 2018-12-05 ENCOUNTER — OFFICE VISIT (OUTPATIENT)
Dept: PSYCHOLOGY | Age: 48
End: 2018-12-05
Payer: COMMERCIAL

## 2018-12-05 DIAGNOSIS — F32.A DEPRESSIVE DISORDER: Primary | ICD-10-CM

## 2018-12-05 PROCEDURE — 90791 PSYCH DIAGNOSTIC EVALUATION: CPT | Performed by: PSYCHOLOGIST

## 2018-12-05 ASSESSMENT — PATIENT HEALTH QUESTIONNAIRE - PHQ9
2. FEELING DOWN, DEPRESSED OR HOPELESS: 3
6. FEELING BAD ABOUT YOURSELF - OR THAT YOU ARE A FAILURE OR HAVE LET YOURSELF OR YOUR FAMILY DOWN: 2
9. THOUGHTS THAT YOU WOULD BE BETTER OFF DEAD, OR OF HURTING YOURSELF: 1
3. TROUBLE FALLING OR STAYING ASLEEP: 2
8. MOVING OR SPEAKING SO SLOWLY THAT OTHER PEOPLE COULD HAVE NOTICED. OR THE OPPOSITE, BEING SO FIGETY OR RESTLESS THAT YOU HAVE BEEN MOVING AROUND A LOT MORE THAN USUAL: 0
1. LITTLE INTEREST OR PLEASURE IN DOING THINGS: 2
SUM OF ALL RESPONSES TO PHQ QUESTIONS 1-9: 17
SUM OF ALL RESPONSES TO PHQ9 QUESTIONS 1 & 2: 5
10. IF YOU CHECKED OFF ANY PROBLEMS, HOW DIFFICULT HAVE THESE PROBLEMS MADE IT FOR YOU TO DO YOUR WORK, TAKE CARE OF THINGS AT HOME, OR GET ALONG WITH OTHER PEOPLE: 1
4. FEELING TIRED OR HAVING LITTLE ENERGY: 3
5. POOR APPETITE OR OVEREATING: 3
7. TROUBLE CONCENTRATING ON THINGS, SUCH AS READING THE NEWSPAPER OR WATCHING TELEVISION: 1
SUM OF ALL RESPONSES TO PHQ QUESTIONS 1-9: 17
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
## 2019-01-17 ENCOUNTER — OFFICE VISIT (OUTPATIENT)
Dept: PSYCHOLOGY | Age: 49
End: 2019-01-17
Payer: COMMERCIAL

## 2019-01-17 DIAGNOSIS — F32.A DEPRESSIVE DISORDER: Primary | ICD-10-CM

## 2019-01-17 DIAGNOSIS — F41.9 ANXIETY: ICD-10-CM

## 2019-01-17 PROCEDURE — 90832 PSYTX W PT 30 MINUTES: CPT | Performed by: PSYCHOLOGIST

## 2019-01-17 ASSESSMENT — PATIENT HEALTH QUESTIONNAIRE - PHQ9
2. FEELING DOWN, DEPRESSED OR HOPELESS: 2
6. FEELING BAD ABOUT YOURSELF - OR THAT YOU ARE A FAILURE OR HAVE LET YOURSELF OR YOUR FAMILY DOWN: 3
9. THOUGHTS THAT YOU WOULD BE BETTER OFF DEAD, OR OF HURTING YOURSELF: 0
5. POOR APPETITE OR OVEREATING: 1
SUM OF ALL RESPONSES TO PHQ QUESTIONS 1-9: 13
10. IF YOU CHECKED OFF ANY PROBLEMS, HOW DIFFICULT HAVE THESE PROBLEMS MADE IT FOR YOU TO DO YOUR WORK, TAKE CARE OF THINGS AT HOME, OR GET ALONG WITH OTHER PEOPLE: 1
SUM OF ALL RESPONSES TO PHQ9 QUESTIONS 1 & 2: 3
4. FEELING TIRED OR HAVING LITTLE ENERGY: 1
7. TROUBLE CONCENTRATING ON THINGS, SUCH AS READING THE NEWSPAPER OR WATCHING TELEVISION: 2
SUM OF ALL RESPONSES TO PHQ QUESTIONS 1-9: 13
1. LITTLE INTEREST OR PLEASURE IN DOING THINGS: 1
3. TROUBLE FALLING OR STAYING ASLEEP: 2
8. MOVING OR SPEAKING SO SLOWLY THAT OTHER PEOPLE COULD HAVE NOTICED. OR THE OPPOSITE, BEING SO FIGETY OR RESTLESS THAT YOU HAVE BEEN MOVING AROUND A LOT MORE THAN USUAL: 1

## 2019-01-21 ENCOUNTER — OFFICE VISIT (OUTPATIENT)
Dept: INTERNAL MEDICINE CLINIC | Age: 49
End: 2019-01-21
Payer: COMMERCIAL

## 2019-01-21 VITALS
OXYGEN SATURATION: 99 % | DIASTOLIC BLOOD PRESSURE: 80 MMHG | BODY MASS INDEX: 43.46 KG/M2 | SYSTOLIC BLOOD PRESSURE: 132 MMHG | RESPIRATION RATE: 16 BRPM | WEIGHT: 230 LBS | HEART RATE: 104 BPM

## 2019-01-21 DIAGNOSIS — F41.9 ANXIETY AND DEPRESSION: Primary | ICD-10-CM

## 2019-01-21 DIAGNOSIS — F32.A ANXIETY AND DEPRESSION: Primary | ICD-10-CM

## 2019-01-21 DIAGNOSIS — R61 DIAPHORESIS: ICD-10-CM

## 2019-01-21 DIAGNOSIS — R45.0 NERVOUSNESS: ICD-10-CM

## 2019-01-21 DIAGNOSIS — Z13.31 POSITIVE DEPRESSION SCREENING: ICD-10-CM

## 2019-01-21 PROCEDURE — G8431 POS CLIN DEPRES SCRN F/U DOC: HCPCS | Performed by: INTERNAL MEDICINE

## 2019-01-21 PROCEDURE — 99213 OFFICE O/P EST LOW 20 MIN: CPT | Performed by: INTERNAL MEDICINE

## 2019-01-21 RX ORDER — ESCITALOPRAM OXALATE 10 MG/1
10 TABLET ORAL DAILY
Qty: 30 TABLET | Refills: 3 | Status: SHIPPED | OUTPATIENT
Start: 2019-01-21 | End: 2019-01-21 | Stop reason: CLARIF

## 2019-01-21 RX ORDER — BUSPIRONE HYDROCHLORIDE 10 MG/1
10 TABLET ORAL 2 TIMES DAILY
Qty: 60 TABLET | Refills: 0 | Status: SHIPPED | OUTPATIENT
Start: 2019-01-21 | End: 2019-02-20

## 2019-01-21 RX ORDER — DULOXETIN HYDROCHLORIDE 60 MG/1
60 CAPSULE, DELAYED RELEASE ORAL DAILY
COMMUNITY
End: 2019-06-10

## 2019-02-14 ENCOUNTER — OFFICE VISIT (OUTPATIENT)
Dept: FAMILY MEDICINE CLINIC | Age: 49
End: 2019-02-14
Payer: COMMERCIAL

## 2019-02-14 VITALS
HEIGHT: 60 IN | OXYGEN SATURATION: 97 % | DIASTOLIC BLOOD PRESSURE: 80 MMHG | BODY MASS INDEX: 45.04 KG/M2 | WEIGHT: 229.4 LBS | TEMPERATURE: 98.7 F | SYSTOLIC BLOOD PRESSURE: 114 MMHG | HEART RATE: 98 BPM

## 2019-02-14 DIAGNOSIS — H66.001 ACUTE SUPPURATIVE OTITIS MEDIA OF RIGHT EAR WITHOUT SPONTANEOUS RUPTURE OF TYMPANIC MEMBRANE, RECURRENCE NOT SPECIFIED: ICD-10-CM

## 2019-02-14 DIAGNOSIS — J01.90 ACUTE NON-RECURRENT SINUSITIS, UNSPECIFIED LOCATION: Primary | ICD-10-CM

## 2019-02-14 DIAGNOSIS — R05.9 COUGH: ICD-10-CM

## 2019-02-14 PROCEDURE — 99213 OFFICE O/P EST LOW 20 MIN: CPT | Performed by: NURSE PRACTITIONER

## 2019-02-14 RX ORDER — VITAMIN B COMPLEX
1 CAPSULE ORAL DAILY
COMMUNITY
End: 2019-03-04

## 2019-02-14 RX ORDER — BENZONATATE 100 MG/1
100 CAPSULE ORAL 3 TIMES DAILY PRN
Qty: 30 CAPSULE | Refills: 0 | Status: SHIPPED | OUTPATIENT
Start: 2019-02-14 | End: 2019-02-21

## 2019-02-14 RX ORDER — AMOXICILLIN AND CLAVULANATE POTASSIUM 875; 125 MG/1; MG/1
1 TABLET, FILM COATED ORAL 2 TIMES DAILY
Qty: 20 TABLET | Refills: 0 | Status: SHIPPED | OUTPATIENT
Start: 2019-02-14 | End: 2019-02-24

## 2019-02-14 ASSESSMENT — ENCOUNTER SYMPTOMS
DIARRHEA: 0
SINUS COMPLAINT: 1
SHORTNESS OF BREATH: 0
SORE THROAT: 1
RHINORRHEA: 1
SINUS PAIN: 1
NAUSEA: 1
SINUS PRESSURE: 1
CONSTIPATION: 0
COUGH: 1
TROUBLE SWALLOWING: 0

## 2019-02-22 ENCOUNTER — TELEPHONE (OUTPATIENT)
Dept: INTERNAL MEDICINE CLINIC | Age: 49
End: 2019-02-22

## 2019-03-04 ENCOUNTER — OFFICE VISIT (OUTPATIENT)
Dept: INTERNAL MEDICINE CLINIC | Age: 49
End: 2019-03-04
Payer: COMMERCIAL

## 2019-03-04 VITALS
BODY MASS INDEX: 44.16 KG/M2 | HEART RATE: 70 BPM | SYSTOLIC BLOOD PRESSURE: 124 MMHG | DIASTOLIC BLOOD PRESSURE: 80 MMHG | RESPIRATION RATE: 16 BRPM | WEIGHT: 228 LBS

## 2019-03-04 DIAGNOSIS — F33.41 RECURRENT MAJOR DEPRESSIVE DISORDER, IN PARTIAL REMISSION (HCC): Primary | ICD-10-CM

## 2019-03-04 DIAGNOSIS — F17.200 SMOKER: ICD-10-CM

## 2019-03-04 DIAGNOSIS — E66.01 OBESITY, CLASS III, BMI 40-49.9 (MORBID OBESITY) (HCC): ICD-10-CM

## 2019-03-04 DIAGNOSIS — R92.8 ABNORMAL MAMMOGRAM: ICD-10-CM

## 2019-03-04 PROCEDURE — 99213 OFFICE O/P EST LOW 20 MIN: CPT | Performed by: INTERNAL MEDICINE

## 2019-05-24 RX ORDER — DULOXETIN HYDROCHLORIDE 30 MG/1
30 CAPSULE, DELAYED RELEASE ORAL DAILY
Qty: 30 CAPSULE | Refills: 2 | Status: SHIPPED | OUTPATIENT
Start: 2019-05-24 | End: 2019-09-03 | Stop reason: SDUPTHER

## 2019-06-10 ENCOUNTER — OFFICE VISIT (OUTPATIENT)
Dept: INTERNAL MEDICINE CLINIC | Age: 49
End: 2019-06-10
Payer: COMMERCIAL

## 2019-06-10 VITALS
DIASTOLIC BLOOD PRESSURE: 82 MMHG | HEART RATE: 72 BPM | WEIGHT: 222 LBS | RESPIRATION RATE: 16 BRPM | SYSTOLIC BLOOD PRESSURE: 124 MMHG | HEIGHT: 60 IN | BODY MASS INDEX: 43.59 KG/M2

## 2019-06-10 DIAGNOSIS — E78.2 MIXED HYPERLIPIDEMIA: ICD-10-CM
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
## 2019-06-10 DIAGNOSIS — E66.01 OBESITY, CLASS III, BMI 40-49.9 (MORBID OBESITY) (HCC): ICD-10-CM

## 2019-06-10 DIAGNOSIS — R21 RASH: ICD-10-CM

## 2019-06-10 DIAGNOSIS — F17.200 SMOKER: ICD-10-CM

## 2019-06-10 PROCEDURE — 99214 OFFICE O/P EST MOD 30 MIN: CPT | Performed by: INTERNAL MEDICINE

## 2019-06-10 RX ORDER — DOXYCYCLINE HYCLATE 100 MG/1
100 CAPSULE ORAL 2 TIMES DAILY
Qty: 28 CAPSULE | Refills: 0 | Status: SHIPPED | OUTPATIENT
Start: 2019-06-10 | End: 2019-06-24
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
## 2019-06-14 DIAGNOSIS — E78.2 MIXED HYPERLIPIDEMIA: ICD-10-CM
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
## 2019-06-14 DIAGNOSIS — E66.01 OBESITY, CLASS III, BMI 40-49.9 (MORBID OBESITY) (HCC): ICD-10-CM

## 2019-06-14 DIAGNOSIS — R21 RASH: ICD-10-CM

## 2019-06-14 LAB
A/G RATIO: 1.6 (ref 1.1–2.2)
ALBUMIN SERPL-MCNC: 4.2 G/DL (ref 3.4–5)
ALP BLD-CCNC: 75 U/L (ref 40–129)
ALT SERPL-CCNC: 30 U/L (ref 10–40)
ANION GAP SERPL CALCULATED.3IONS-SCNC: 12 MMOL/L (ref 3–16)
AST SERPL-CCNC: 19 U/L (ref 15–37)
BASOPHILS ABSOLUTE: 0 K/UL (ref 0–0.2)
BASOPHILS RELATIVE PERCENT: 0.7 %
BILIRUB SERPL-MCNC: <0.2 MG/DL (ref 0–1)
BUN BLDV-MCNC: 14 MG/DL (ref 7–20)
CALCIUM SERPL-MCNC: 9.4 MG/DL (ref 8.3–10.6)
CHLORIDE BLD-SCNC: 104 MMOL/L (ref 99–110)
CHOLESTEROL, TOTAL: 203 MG/DL (ref 0–199)
CO2: 26 MMOL/L (ref 21–32)
CREAT SERPL-MCNC: 0.8 MG/DL (ref 0.6–1.1)
EOSINOPHILS ABSOLUTE: 0.2 K/UL (ref 0–0.6)
EOSINOPHILS RELATIVE PERCENT: 2.9 %
GFR AFRICAN AMERICAN: >60
GFR NON-AFRICAN AMERICAN: >60
GLOBULIN: 2.6 G/DL
GLUCOSE BLD-MCNC: 113 MG/DL (ref 70–99)
HCT VFR BLD CALC: 45.7 % (ref 36–48)
HDLC SERPL-MCNC: 61 MG/DL (ref 40–60)
HEMOGLOBIN: 15.2 G/DL (ref 12–16)
LDL CHOLESTEROL CALCULATED: 121 MG/DL
LYMPHOCYTES ABSOLUTE: 2 K/UL (ref 1–5.1)
LYMPHOCYTES RELATIVE PERCENT: 28.3 %
MCH RBC QN AUTO: 29.9 PG (ref 26–34)
MCHC RBC AUTO-ENTMCNC: 33.2 G/DL (ref 31–36)
MCV RBC AUTO: 90 FL (ref 80–100)
MONOCYTES ABSOLUTE: 0.6 K/UL (ref 0–1.3)
MONOCYTES RELATIVE PERCENT: 8.4 %
NEUTROPHILS ABSOLUTE: 4.2 K/UL (ref 1.7–7.7)
NEUTROPHILS RELATIVE PERCENT: 59.7 %
PDW BLD-RTO: 13.9 % (ref 12.4–15.4)
PLATELET # BLD: 252 K/UL (ref 135–450)
PMV BLD AUTO: 9.8 FL (ref 5–10.5)
POTASSIUM SERPL-SCNC: 5 MMOL/L (ref 3.5–5.1)
RBC # BLD: 5.08 M/UL (ref 4–5.2)
SODIUM BLD-SCNC: 142 MMOL/L (ref 136–145)
TOTAL PROTEIN: 6.8 G/DL (ref 6.4–8.2)
TRIGL SERPL-MCNC: 106 MG/DL (ref 0–150)
VLDLC SERPL CALC-MCNC: 21 MG/DL
WBC # BLD: 7.1 K/UL (ref 4–11)

## 2019-06-16 LAB — LYME, EIA: 0.11 LIV (ref 0–1.2)

## 2019-09-03 RX ORDER — DULOXETIN HYDROCHLORIDE 30 MG/1
30 CAPSULE, DELAYED RELEASE ORAL DAILY
Qty: 30 CAPSULE | Refills: 2 | Status: SHIPPED | OUTPATIENT
Start: 2019-09-03 | End: 2019-12-09 | Stop reason: SDUPTHER

## 2019-12-09 ENCOUNTER — OFFICE VISIT (OUTPATIENT)
Dept: INTERNAL MEDICINE CLINIC | Age: 49
End: 2019-12-09
Payer: COMMERCIAL

## 2019-12-09 VITALS
SYSTOLIC BLOOD PRESSURE: 112 MMHG | BODY MASS INDEX: 44 KG/M2 | DIASTOLIC BLOOD PRESSURE: 74 MMHG | OXYGEN SATURATION: 99 % | HEART RATE: 91 BPM | RESPIRATION RATE: 20 BRPM | WEIGHT: 227.2 LBS

## 2019-12-09 DIAGNOSIS — F33.42 RECURRENT MAJOR DEPRESSIVE DISORDER, IN FULL REMISSION (HCC): Primary | ICD-10-CM

## 2019-12-09 DIAGNOSIS — E66.01 CLASS 3 SEVERE OBESITY DUE TO EXCESS CALORIES WITHOUT SERIOUS COMORBIDITY WITH BODY MASS INDEX (BMI) OF 40.0 TO 44.9 IN ADULT (HCC): ICD-10-CM

## 2019-12-09 DIAGNOSIS — F17.200 SMOKER: ICD-10-CM

## 2019-12-09 PROCEDURE — 99213 OFFICE O/P EST LOW 20 MIN: CPT | Performed by: INTERNAL MEDICINE

## 2019-12-09 RX ORDER — DULOXETIN HYDROCHLORIDE 30 MG/1
30 CAPSULE, DELAYED RELEASE ORAL DAILY
Qty: 90 CAPSULE | Refills: 1 | Status: SHIPPED | OUTPATIENT
Start: 2019-12-09 | End: 2020-07-13 | Stop reason: SDUPTHER

## 2020-01-08 PROBLEM — L72.3 INFLAMED SEBACEOUS CYST: Status: ACTIVE | Noted: 2020-01-08

## 2020-01-10 ENCOUNTER — HOSPITAL ENCOUNTER (OUTPATIENT)
Age: 50
Setting detail: SPECIMEN
Discharge: HOME | End: 2020-01-10
Payer: COMMERCIAL

## 2020-01-10 PROCEDURE — 88304 TISSUE EXAM BY PATHOLOGIST: CPT

## 2020-04-29 ENCOUNTER — HOSPITAL ENCOUNTER (OUTPATIENT)
Dept: ULTRASOUND IMAGING | Age: 50
Discharge: HOME | End: 2020-04-29
Payer: COMMERCIAL

## 2020-04-29 PROCEDURE — 76536 US EXAM OF HEAD AND NECK: CPT

## 2020-07-13 ENCOUNTER — OFFICE VISIT (OUTPATIENT)
Dept: INTERNAL MEDICINE CLINIC | Age: 50
End: 2020-07-13
Payer: COMMERCIAL

## 2020-07-13 VITALS
OXYGEN SATURATION: 98 % | DIASTOLIC BLOOD PRESSURE: 80 MMHG | BODY MASS INDEX: 42.77 KG/M2 | WEIGHT: 219 LBS | HEART RATE: 91 BPM | TEMPERATURE: 98.6 F | SYSTOLIC BLOOD PRESSURE: 120 MMHG

## 2020-07-13 PROBLEM — C09.9 TONSIL CANCER (HCC): Status: ACTIVE | Noted: 2020-07-13

## 2020-07-13 PROCEDURE — 99214 OFFICE O/P EST MOD 30 MIN: CPT | Performed by: FAMILY MEDICINE

## 2020-07-13 RX ORDER — DULOXETIN HYDROCHLORIDE 30 MG/1
30 CAPSULE, DELAYED RELEASE ORAL DAILY
Qty: 30 CAPSULE | Refills: 5 | Status: SHIPPED | OUTPATIENT
Start: 2020-07-13 | End: 2021-03-25 | Stop reason: SDUPTHER

## 2020-07-13 ASSESSMENT — ENCOUNTER SYMPTOMS
CONSTIPATION: 0
SORE THROAT: 0
COLOR CHANGE: 0
CHEST TIGHTNESS: 0
VOMITING: 0
DIARRHEA: 0
SHORTNESS OF BREATH: 0
ABDOMINAL PAIN: 0

## 2020-07-13 NOTE — PROGRESS NOTES
Subjective:      Chief Complaint   [facility] with   [facility]     DR. Zully Gavin    3 Month Follow-Up    Depression    Anxiety    Knee Pain     twisted right knee    Other     tonsill cancer       HPI:  Rui Erickson is a 52 y.o. female who presents today to establish care with new provider and for management of chronic medical conditions as below. 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 Is still awaiting final pathology to see if she needs to do chemo and radiation. Has follow up with them in a few weeks. Knee pain:  States she has twisted her R knee 3 times in the past few weeks and has had swelling. Feels like it is going to hyperextend sometimes when she stands. Used to get injections in that knee but it has been several years, already has an orthopedist.  Has also had arthroscopy in L knee. Was started on cymbalta a few years ago for mood. States it has been keeping symptoms well controlled- did try increasing dose last year but did not like the way it made her feel so she went back to lower dose. Otherwise feeling well today. Labs reviewed.        Past Medical History:   Diagnosis Date    Acne vulgaris     Anxiety and depression 2018    Family history of breast cancer     mother and 5 females in her family with CA breast;    GERD (gastroesophageal reflux disease)     Left lumbar radiculopathy 2016    Obesity, Class III, BMI 40-49.9 (morbid obesity) ([address]     Osteoarthritis Knee     bilateral osteoarthritis of knees and patellofemoral-followed by Dr Lizet Mclaughlin Paroxysmal tachycardia unspecified     with heart rate 145; (?AVNRT)    Prediabetes 2015    Smoker         Past Surgical History:   Procedure Laterality Date     SECTION      ENDOMETRIAL ABLATION  2005    KNEE ARTHROSCOPY Left     LAPAROSCOPIC APPENDECTOMY  2013    NICOLE AND BSO  2011 Social History     Tobacco Use    Smoking status: Former Smoker     Packs/day: 0.25     Years: 33.00     Pack years: 8.25     Types: Cigarettes     Start date: 7/14/1985    Smokeless tobacco: Never Used   Substance Use Topics    Alcohol use: No     Alcohol/week: 0.0 standard drinks     Comment: rare        Review of Systems   Constitutional: Negative for activity change, appetite change, chills, fever and unexpected weight change. HENT: Negative for congestion and sore throat. Respiratory: Negative for chest tightness and shortness of breath. Cardiovascular: Negative for chest pain and palpitations. Gastrointestinal: Negative for abdominal pain, constipation, diarrhea and vomiting. Genitourinary: Negative for dysuria. Musculoskeletal: Positive for arthralgias and joint swelling. Skin: Negative for color change. Neurological: Negative for dizziness and light-headedness. Psychiatric/Behavioral: Negative for dysphoric mood. The patient is not nervous/anxious. Prior to Visit Medications    Medication Sig Taking? Authorizing Provider   cyanocobalamin ([employer] VITAMIN B12) 1000 MCG tablet Take 1 tablet by mouth every morning  Historical Provider, MD   DULoxetine (CYMBALTA) 30 MG extended release capsule Take 1 capsule by mouth daily  Francisco Thompson MD          Objective:      /80   Pulse 91   Temp 98.6 °F (37 °C)   Wt 219 lb (99.3 kg)   SpO2 98%   BMI 42.77 kg/m²      Physical Exam  Vitals signs and nursing note reviewed. Constitutional:       General: She is not in acute distress. Appearance: Normal appearance. She is not ill-appearing or toxic-appearing. HENT:      Head: Normocephalic and atraumatic. Right Ear: External ear normal.      Left Ear: External ear normal.      Nose: Nose normal.      Mouth/Throat:      Pharynx: Oropharynx is clear. Eyes:      General: No scleral icterus. Right eye: No discharge. Left eye: No discharge. Extraocular Movements: Extraocular movements intact. Conjunctiva/sclera: Conjunctivae normal.   Neck:      Musculoskeletal: Normal range of motion and neck supple. No neck rigidity. Cardiovascular:      Rate and Rhythm: Normal rate and regular rhythm. Heart sounds: Normal heart sounds. Pulmonary:      Effort: Pulmonary effort is normal.      Breath sounds: Normal breath sounds. No wheezing or rales. Musculoskeletal:         General: No deformity. Skin:     General: Skin is warm and dry. Findings: No rash. Neurological:      General: No focal deficit present. Mental Status: She is alert. Mental status is at baseline. Motor: No weakness. Psychiatric:         Mood and Affect: Mood normal.         Behavior: Behavior normal.            Assessment / Plan:      1. Recurrent major depressive disorder, in full remission ([facility]  Well controlled with current dose of cymbalta. - DULoxetine (CYMBALTA) 30 MG extended release capsule; Take 1 capsule by mouth daily  Dispense: 30 capsule; Refill: 5    2. General medical exam  Due for labs. - Basic Metabolic Panel; Future  - CBC Auto Differential; Future  - Hemoglobin A1C; Future  - Lipid Panel; Future    3. Acute pain of right knee  Acute on chronic. Concern for ligamentous (or meniscal) injury. Already established with ortho- advised to call and make appointment for further evaluation/treatment. 4. Tonsil cancer ([facility]  New diagnosis last month, s/p resection- still awaiting final pathology results. Being followed at the [facility] with upcoming appointment in a few weeks. Patient voiced understanding and agreement with plan. All questions/concerns were addressed, risks/side effects of medications were reviewed. Return precautions and after visit summary were provided. Return in about 1 year (around 7/13/2021). or earlier as needed.       Madelyn Kirkpatrick MD

## 2020-08-10 ENCOUNTER — TELEPHONE (OUTPATIENT)
Dept: INTERNAL MEDICINE CLINIC | Age: 50
End: 2020-08-10

## 2020-08-31 ENCOUNTER — APPOINTMENT (RX ONLY)
Dept: URBAN - METROPOLITAN AREA CLINIC 47 | Facility: CLINIC | Age: 50
Setting detail: DERMATOLOGY
End: 2020-08-31

## 2020-08-31 DIAGNOSIS — Z02.9 ENCOUNTER FOR ADMINISTRATIVE EXAMINATIONS, UNSPECIFIED: ICD-10-CM

## 2020-09-05 ENCOUNTER — OFFICE VISIT (OUTPATIENT)
Dept: FAMILY MEDICINE CLINIC | Age: 50
End: 2020-09-05
Payer: COMMERCIAL

## 2020-09-05 ENCOUNTER — HOSPITAL ENCOUNTER (OUTPATIENT)
Age: 50
Discharge: HOME | End: 2020-09-05
Payer: COMMERCIAL

## 2020-09-05 VITALS
DIASTOLIC BLOOD PRESSURE: 90 MMHG | OXYGEN SATURATION: 98 % | TEMPERATURE: 97.3 F | SYSTOLIC BLOOD PRESSURE: 124 MMHG | WEIGHT: 232 LBS | HEART RATE: 99 BPM | BODY MASS INDEX: 45.31 KG/M2

## 2020-09-05 LAB
ANION GAP SERPL CALCULATED.3IONS-SCNC: 10 MMOL/L (ref 4–16)
BASOPHILS ABSOLUTE: 0 K/CU MM
BASOPHILS RELATIVE PERCENT: 1 % (ref 0–1)
BUN BLDV-MCNC: 9 MG/DL (ref 6–23)
CALCIUM SERPL-MCNC: 9.3 MG/DL (ref 8.3–10.6)
CHLORIDE BLD-SCNC: 103 MMOL/L (ref 99–110)
CHOLESTEROL: 211 MG/DL
CO2: 29 MMOL/L (ref 21–32)
CREAT SERPL-MCNC: 0.9 MG/DL (ref 0.6–1.1)
DIFFERENTIAL TYPE: ABNORMAL
EOSINOPHILS ABSOLUTE: 0.1 K/CU MM
EOSINOPHILS RELATIVE PERCENT: 3.2 % (ref 0–3)
ESTIMATED AVERAGE GLUCOSE: 120 MG/DL
GFR AFRICAN AMERICAN: >60 ML/MIN/1.73M2
GFR NON-AFRICAN AMERICAN: >60 ML/MIN/1.73M2
GLUCOSE BLD-MCNC: 111 MG/DL (ref 70–99)
HBA1C MFR BLD: 5.8 % (ref 4.2–6.3)
HCT VFR BLD CALC: 44.6 % (ref 37–47)
HDLC SERPL-MCNC: 68 MG/DL
HEMOGLOBIN: 14.4 GM/DL (ref 12.5–16)
IMMATURE NEUTROPHIL %: 0.7 % (ref 0–0.43)
LDL CHOLESTEROL DIRECT: 124 MG/DL
LYMPHOCYTES ABSOLUTE: 0.8 K/CU MM
LYMPHOCYTES RELATIVE PERCENT: 19.7 % (ref 24–44)
MCH RBC QN AUTO: 30.1 PG (ref 27–31)
MCHC RBC AUTO-ENTMCNC: 32.3 % (ref 32–36)
MCV RBC AUTO: 93.1 FL (ref 78–100)
MONOCYTES ABSOLUTE: 0.5 K/CU MM
MONOCYTES RELATIVE PERCENT: 11.6 % (ref 0–4)
NUCLEATED RBC %: 0 %
PDW BLD-RTO: 12.8 % (ref 11.7–14.9)
PLATELET # BLD: 215 K/CU MM (ref 140–440)
PMV BLD AUTO: 10 FL (ref 7.5–11.1)
POTASSIUM SERPL-SCNC: 4.1 MMOL/L (ref 3.5–5.1)
RBC # BLD: 4.79 M/CU MM (ref 4.2–5.4)
SEGMENTED NEUTROPHILS ABSOLUTE COUNT: 2.6 K/CU MM
SEGMENTED NEUTROPHILS RELATIVE PERCENT: 63.8 % (ref 36–66)
SODIUM BLD-SCNC: 142 MMOL/L (ref 135–145)
TOTAL IMMATURE NEUTOROPHIL: 0.03 K/CU MM
TOTAL NUCLEATED RBC: 0 K/CU MM
TRIGL SERPL-MCNC: 110 MG/DL
TSH HIGH SENSITIVITY: 0.55 UIU/ML (ref 0.27–4.2)
WBC # BLD: 4.1 K/CU MM (ref 4–10.5)

## 2020-09-05 PROCEDURE — 36415 COLL VENOUS BLD VENIPUNCTURE: CPT

## 2020-09-05 PROCEDURE — 80061 LIPID PANEL: CPT

## 2020-09-05 PROCEDURE — 85025 COMPLETE CBC W/AUTO DIFF WBC: CPT

## 2020-09-05 PROCEDURE — 99213 OFFICE O/P EST LOW 20 MIN: CPT | Performed by: NURSE PRACTITIONER

## 2020-09-05 PROCEDURE — 84443 ASSAY THYROID STIM HORMONE: CPT

## 2020-09-05 PROCEDURE — 83036 HEMOGLOBIN GLYCOSYLATED A1C: CPT

## 2020-09-05 PROCEDURE — 83721 ASSAY OF BLOOD LIPOPROTEIN: CPT

## 2020-09-05 PROCEDURE — 80048 BASIC METABOLIC PNL TOTAL CA: CPT

## 2020-09-05 RX ORDER — ERYTHROMYCIN 5 MG/G
OINTMENT OPHTHALMIC
Qty: 1 TUBE | Refills: 0 | Status: SHIPPED | OUTPATIENT
Start: 2020-09-05 | End: 2020-09-15

## 2020-09-05 ASSESSMENT — ENCOUNTER SYMPTOMS
EYE REDNESS: 1
COUGH: 0
TROUBLE SWALLOWING: 0
EYE PAIN: 1
EYE DISCHARGE: 1
SINUS PAIN: 0
SORE THROAT: 1
BLOOD IN STOOL: 0
PHOTOPHOBIA: 0
NAUSEA: 0
WHEEZING: 0
RHINORRHEA: 0
SINUS PRESSURE: 0
ABDOMINAL PAIN: 0
EYE ITCHING: 0
CONSTIPATION: 0
DIARRHEA: 0
CHEST TIGHTNESS: 0
SHORTNESS OF BREATH: 0
VOMITING: 0
VOICE CHANGE: 0
FACIAL SWELLING: 0
BACK PAIN: 0

## 2020-09-05 NOTE — PATIENT INSTRUCTIONS
Patient Education        Styes and Chalazia: Care Instructions  Your Care Instructions     Styes and chalazia (say \"[id number]\") are both conditions that can cause swelling of the eyelid. A stye is an infection in the root of an eyelash. The infection causes a tender red lump on the edge of the eyelid. The infection can spread until the whole eyelid becomes red and inflamed. Styes usually break open, and a tiny amount of pus drains. They usually clear up on their own in about a week, but they sometimes need treatment with antibiotics. A chalazion is a lump or cyst in the eyelid (chalazion is singular; chalazia is plural). It is caused by swelling and inflammation of deep oil glands inside the eyelid. Chalazia are usually not infected. They can take a few months to heal.  If a chalazion becomes more swollen and painful or does not go away, you may need to have it drained by your doctor. Follow-up care is a key part of your treatment and safety. Be sure to make and go to all appointments, and call your doctor if you are having problems. It's also a good idea to know your test results and keep a list of the medicines you take. How can you care for yourself at home? · Do not rub your eyes. Do not squeeze or try to open a stye or chalazion. · To help a stye or chalazion heal faster:  ? Put a warm, moist compress on your eye for 5 to 10 minutes, 3 to 6 times a day. Heat often brings a stye to a point where it drains on its own. Keep in mind that warm compresses will often increase swelling a little at first.  ? Do not use hot water or heat a wet cloth in a microwave oven. The compress may get too hot and can burn the eyelid. · Always wash your hands before and after you use a compress or touch your eyes. · If the doctor gave you antibiotic drops or ointment, use the medicine exactly as directed. Use the medicine for as long as instructed, even if your eye starts to feel better.   · To put in eyedrops or
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
## 2021-01-08 ENCOUNTER — HOSPITAL ENCOUNTER (OUTPATIENT)
Dept: WOUND CARE | Age: 51
Discharge: HOME | End: 2021-01-08
Payer: COMMERCIAL

## 2021-01-08 VITALS
WEIGHT: 196 LBS | TEMPERATURE: 97.8 F | BODY MASS INDEX: 37 KG/M2 | RESPIRATION RATE: 18 BRPM | DIASTOLIC BLOOD PRESSURE: 75 MMHG | SYSTOLIC BLOOD PRESSURE: 113 MMHG | HEART RATE: 84 BPM | HEIGHT: 61 IN

## 2021-01-08 DIAGNOSIS — Z01.818 PRE-OP TESTING: Primary | ICD-10-CM
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
## 2021-01-08 DIAGNOSIS — M27.2 OSTEORADIONECROSIS OF JAW: ICD-10-CM

## 2021-01-08 PROCEDURE — 99213 OFFICE O/P EST LOW 20 MIN: CPT

## 2021-01-08 RX ORDER — VITAMIN E 268 MG
450 CAPSULE ORAL DAILY
COMMUNITY
End: 2021-04-22

## 2021-01-08 RX ORDER — SODIUM FLUORIDE 5 MG/G
GEL, DENTIFRICE DENTAL NIGHTLY
COMMUNITY
End: 2022-08-16

## 2021-01-08 RX ORDER — PENTOXIFYLLINE 400 MG/1
400 TABLET, EXTENDED RELEASE ORAL
COMMUNITY
End: 2021-04-22

## 2021-01-08 RX ORDER — CHLORHEXIDINE GLUCONATE 0.12 MG/ML
5 RINSE ORAL 2 TIMES DAILY
COMMUNITY
End: 2021-03-25 | Stop reason: ALTCHOICE

## 2021-01-08 RX ORDER — DOXYCYCLINE HYCLATE 100 MG/1
100 CAPSULE ORAL DAILY
COMMUNITY
End: 2021-04-22

## 2021-01-08 ASSESSMENT — PAIN DESCRIPTION - DESCRIPTORS: DESCRIPTORS: SORE

## 2021-01-08 ASSESSMENT — PAIN DESCRIPTION - LOCATION: LOCATION: MOUTH

## 2021-01-08 ASSESSMENT — PAIN DESCRIPTION - ONSET: ONSET: GRADUAL

## 2021-01-08 NOTE — H&P
Hyperbaric oxygen history and physical        Mar Newberry  AGE: 48 y.o. GENDER: female  DOD: 1970  TODAY'S DATE:  1/8/2021    Subjective:      Mar Newberry is a 48 y.o. 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 The condition is of marked severity and is considered non healing and chronic. The wound has been present for several months (extraction of impacted tooth was August 2020). She initially had radiation delayed to allow the site to heal- ultimately her dental appointment on August 19 had healed surgical site and she was approved for radiation. She had her radiation treatments per protocol and finished by December. Half way through December she started to have increased pain at the tooth extraction site. She had f/u visit with her dentist who had the oral surgeon at [facility] follow. There was concern for exposed root near the extraction site. She had close follow-up as there was suspicion of developing osteonecrosis. On her recent f/u on December 23, she continued to have pain and exposed root near the extraction site. She was diagnosed with osteoradionecrosis at the impaction site and was sent here for HBO treatments with expectation that this will allow the area to heal.  She has finished a course of augmentin. She presents here for evaluation. She still has pain at the site. She is not diabetic, she is a non-smoker. Other than the tonsillar cancer, she had been a fairly healthy patient. She is still following with her oral surgeon [facility].    The patient has significant underlying medical conditions as listed below in PMH    Past Medical History:        Diagnosis Date    Acne vulgaris     Anxiety and depression 2018    Family history of breast cancer     mother and 5 females in her family with CA breast;    GERD (gastroesophageal reflux disease)     HBO-Osteoradionecrosis of jaw 2021    Left lumbar radiculopathy 2016    Obesity, Class III, BMI 40-49.9 (morbid obesity) ([facility] [address]     Osteoarthritis Knee     bilateral osteoarthritis of knees and patellofemoral-followed by Dr Gregory Kerr Paroxysmal tachycardia unspecified     with heart rate 145; (?AVNRT)    Prediabetes     Smoker        Medical History Pertinent to HBO:   Patient denies a history of amiodarone use or Bleomycin use with the last 12 months. No current chemotherapy use. No history of chronic sinusitis or upper respiratory infections, no reconstructive ear surgery or significant auditory impairment. No history of recent or uncontrolled seizure disorder. No history of significant cognitive impairment or psychiatric illnesses. No history of severe COPD with CO2 retention or bullous emphysema. No history of recent chest surgery. No history of pneumothorax. No history of pacemaker or AICD. No history of sudden CHF. No spherocytoses, sickle cell disease, or other hemoglobinopathy known. No history of optic neuritis or significant visual impairment. Past Surgical History:        Procedure Laterality Date     SECTION      ENDOMETRIAL ABLATION      KNEE ARTHROSCOPY Left     LAPAROSCOPIC APPENDECTOMY  2013    NICOLE AND BSO  2011         Medications Prior to Admission:    Prior to Admission medications    Medication Sig Start Date End Date Taking?  Authorizing Provider   chlorhexidine (PERIDEX) 0.12 % solution Take 5 mLs by mouth 2 times daily   Yes Historical Provider, MD   SODIUM FLUORIDE, DENTAL GEL, (SF) 1.1 % GEL Place onto teeth nightly   Yes Historical Provider, MD   pentoxifylline (TRENTAL) 400 MG extended release tablet Take 400 mg by mouth 3 times daily (with meals)   Yes Historical Provider, MD   vitamin E 400 UNIT capsule Take 450 Units by mouth daily   Yes Historical Provider, MD   doxycycline hyclate (VIBRAMYCIN) 100 MG capsule Take 100 mg by mouth daily   Yes Historical Provider, MD   Turmeric (QC TUMERIC COMPLEX PO) Take 1,000 Units by mouth 2 times daily   Yes Historical Provider, MD   cyanocobalamin ([facility] VITAMIN B12) 1000 MCG tablet Take 1 tablet by mouth every morning   Yes Historical Provider, MD   DULoxetine (CYMBALTA) 30 MG extended release capsule Take 1 capsule by mouth daily 7/13/20  Yes Carolina Oliva MD       Allergies:  Latex and Daypro [oxaprozin]    Family history:  See information in Epic chart. Positive for obesity. Social History:   TOBACCO:   reports that she has quit smoking. Her smoking use included cigarettes. She started smoking about 35 years ago. She has a 8.25 pack-year smoking history. She has never used smokeless tobacco.  ETOH:   reports no history of alcohol use.  home with family, patient is independent, does not need home care. ROS:     REVIEW OF SYSTEMS    positive for jaw pain- mainly on the left, otherwise, no other complaints, rest of the 10 point ROS is negative        Objective:     Physical exam:    GEN: Awake, oriented, looks well, looks stated age  HEENt: normocephalic head, symmetrical face, pupils are equal and round  EOM's intact  Hearing normal to spoken word, TM's intact with good light reflex bilaterally  Neck: no mass  Teeth- few caries, left molar (lower) has visible root today laterally  Normal oral mucosa, some missing teeth  CV: S1, S2, RRR  Lungs: CTA bilaterally  ABD: soft, normal  Ext; radial pulses normal  Skin, warm, dry intact, no rash    Wound exam:     No actual 'wound' but there is visible root on oral exam.     Assessment       Joe Flores  appears to have a non-healing wound of the  Left lower jaw. The etiology of the wound is felt to be osteonecrosis of the jaw. There are multiple complicating factors including  recent radiation/chemo for tonsil cancer.   Hyperbaric oxygen would be an appropriate and essential adjunct in the treatment and resolution of this jaw osteoradionecrosis. She shall continue to follow with the oral surgeon as well for resolution of the exposed root. This patient has no contraindications to HBO therapy and would be considered appropriate candidate. This patient appears to have sufficient physiologic and psychologic stamina to undergo Hyperbaric Oxygen Therapy.         Plan     Initiate HBO per order sheet        Diagnostic Impression:      Radiation Osteonecrosis of the jaw      Meet Delgado MD    1/8/2021

## 2021-01-14 NOTE — PROGRESS NOTES
This SN called [facility] [employer] On Monday 1/11/2021 to verify coverage and benefits for Hyperbaric Oxygen Therapy; benefits for medical were received via fax along with a code; SN called [facility] to verify if CPT & Dx. Codes were billable codes ; was on hold for a long period of time , then disconnected. SN unable to call back on this day due working on other HBO referrals. SN called R again on 1/13/2021 ; code was no longer good, another code was issued via fax. Called R and spoke with Rep Sandy Tello , facility NPI & TIN numbers given, Sandy Tello confirmed were are participating. CPT codes 81279 & , also Dx.codes  M27.2 & Y84.2 given all codes valid & billable per Sandy Tello; she also confirmed patient's deductible & OOP max amounts & that there are no accumulative amounts with the New year. Patient will be covered at 80% until the OOP max is met then will be covered 100%. Per Sandy Tello, no prior authorization is required; no maximum number of visits as long as medically necessary. Ref # [id number] for this inquiry This SN called patient on 1/13/21, informed of the above ; discussed treatment times available, also testing needed for HBO workup. Patient will discuss needs with her employer , also calling her Insurance Co to try to find out how much her Co pays might be, then call this SN back. Also discussed with patient, SN will make sure orders for tests are accessible to imaging center so she can go there to have them completed.

## 2021-01-18 ENCOUNTER — HOSPITAL ENCOUNTER (OUTPATIENT)
Dept: GENERAL RADIOLOGY | Age: 51
Discharge: HOME | End: 2021-01-18
Payer: COMMERCIAL

## 2021-01-18 ENCOUNTER — HOSPITAL ENCOUNTER (OUTPATIENT)
Age: 51
Discharge: HOME | End: 2021-01-18
Payer: COMMERCIAL

## 2021-01-18 DIAGNOSIS — Z01.818 PRE-OP TESTING: ICD-10-CM

## 2021-01-18 PROCEDURE — 71046 X-RAY EXAM CHEST 2 VIEWS: CPT

## 2021-01-18 NOTE — PROGRESS NOTES
communication system allows you to speak with the , family members or the physician. You will also be able to watch and listen to television during treatment. The monoplace chamber administers 100% oxygen at a physician prescribed pressure. Because you will be in an oxygen environment, a detailed list of safety precautions and guidelines will be strictly enforced for your safety. TREATMENT SCHEDULE  You will need to arrange for your own transportation. If there is difficulty, we will try to assist in making the necessary arrangements with an appropriate agency. Hyperbaric treatments are given daily, Monday through Friday. Each treatment will last almost two (2) hours. Be prepared to spend approximately three (3) hours at our facility. This allows time for preparing you for your treatment dive and the actual time in the chamber. It is essential that you try to make every scheduled treatment dive possible so that the effects of the hyperbaric oxygen will continue. Any long interruption could cause the healing enhancement to slow or even stop. If at any time you have chills, fever, nausea, vomiting, diarrhea or any problem with your glucose levels, please inform the physician or the nurse. You will be assessed to see if you should be in the chamber that day. You will receive a complete briefing by a staff member. Necessary forms and informed consents (permits) will need to be signed before treatments begin. You will also be given a tour of the area . VISITORS  Visitors are welcome and we encourage patients to bring their families. We ask that once your family has seen the facility that they remain in the waiting room to ensure your privacy, and the privacy of the other patients. Visitors are not allowed in the treatment area unless their presence is needed by the physician. Again, we welcome you to our facility.   Please let us know if there is anything that we can do to assist you during your time with us. GENERAL INFORMATION REVIEWED: Yes      You will need to arrive 30 minutes prior to your visit. Please call us if you are not feeling well the day of your visit, so that we can determine if it will be necessary to reschedule your treatment (before you arrive). Each visit will begin with a staff member asking you a series of questions. These questions may sound repetitive, but please understand, for safety reasons, that we must ask the same questions each and every visit. Only chamber approved clothing may be worn during treatment, therefore special clothing is provided for you at each visit. For safety reasons, chamber operators are required to ask about prohibited items each and every visit. Vital signs (blood pressure, temperature pulse, and respirations) will be taken both before and after each visit. PRESSURE CHANGES INFORMATION REVIEWED: Yes   As the pressure within the chamber changes, you may notice changes to your ears, sinuses or lungs. For example, your ears may \"pop\" as if you are traveling on an airplane or scuba diving. Please notify the chamber  if you are experiencing pain in your ears, sinuses or lungs during your treatment \"dive. \"      To help prevent pain or injury to your ears, you will be taught by the staff and will practice thoroughly a procedure, known as the Valsalva maneuver, as well as other techniques prior to your first treatment \"dive. \"  Although the pressure in the chamber is not felt on the body, you do feel changes in the ears. The eardrum is normally flat while you are at ground level. Pressure changes in the atmosphere around you will usually be felt in the ears. The eardrum tends to bow inwardly and unless you take positive action, fullness or pain will be felt.   In order to avoid this, you will be taught how to force air into the middle ear during the few minutes that it takes to pressurize the chamber. To help prevent pain or injury to your lungs, please make sure to notify a staff member if you have any upper respiratory infection and/or congestion. It is very important not to hold your breath during your treatment \"dive\", just breath normally. PATIENTS WITH DIABETES ONLY Yes  If you have diabetes your blood sugar level will be tested before each and every treatment. If your blood sugar level is too low, we will attempt to help you raise it by providing a diabetic nutritional shake. We will retest your blood sugar shortly thereafter. If your blood sugar level is still low, we may not be able to provide a treatment \"dive\" that day. If your blood sugar levels is too high, we also may not be able to provide a treatment \"dive\" that day. If your blood sugar level continues to be too high or too low, not allowing you to continue with the hyperbaric treatments, you will need to contact your primary physician to help manage your blood sugar more effectively. ABOUT YOUR TREATMENT INFORMATION REVIEWED: Yes   If you are feeling nervous or anxious about these treatments, please let a staff member know. We are here to help you. Before each and every treatment, please let us know of any changes regarding:    your medication, especially cancer medication  any possibility of being pregnant  any new implants or devices    Temporary vision changes may occur during hyperbaric oxygen therapy. Therefore, it is recommended that you not change eyeglass prescriptions during therapy. Changes that occur during therapy should return to pre-treatment baseline within several weeks of the conclusion of therapy.  In the rare instance that vision has not returned to the pre-treatment baseline; it is recommended that you schedule an eye exam with your Opthalmalogist.    It is very important to make the clinical staff aware if you have ever had a collapsed lung    ITEMS TO AVOID INFORMATION REVIEWED: Yes Smoking has a negative effect on treatment and may diminish the benefits you may receive. Smoking within 2 hours prior to your treatment poses additional risk and should be avoided completely. Drinking alcohol or using illicit drugs may also have a negative effect on your treatment and should be avoided. Drinking carbonated beverages such as soda pop within 1 hour of your treatment could cause pains in the stomach during the treatment. Caffeinated beverages or foods containing caffeine should be avoided before your treatment. Please let us know if we can assist you with seeking help to stop smoking, drinking or using recreational drugs. PROHIBITED ITEMS INFORMATION REVIEWED: Yes   No wigs, hair spray, hair oils, hair ornaments, creams, lotions, make-up, after shave, perfumes, colognes, chap stick, lip balms, mustache waxes, petroleum products (e.g. Vaseline), baby oil, deodorant or ointments  No nail polish    No synthetic clothing  No nylon hose, underwear, panty hose or bra  No food, gum or candy  No jewelry  No smoking materials such as lighter, cigarettes or matches  No reading material  No hearing aids, non-fixed dentures  No Hard (non-gas permeable) contact lenses  Most dressings are approved to wear into the hyperbaric chamber. Please advise the hyperbaric staff of any new dressings applied to your wound to ensure the new dressings are compatible with hyperbaric oxygen treatments. No alcohol preps  No heat packs  No street clothes or shoes  No cell phones or other electronics  If it was not a part of you when you were born into this world, if it was not provided by the chamber , then it cannot go into the chamber with you.         Electronically signed by Prachi Griffin LPN on 3/11/9672 at 8:58 PM

## 2021-01-19 ENCOUNTER — HOSPITAL ENCOUNTER (OUTPATIENT)
Dept: HYPERBARIC MEDICINE | Age: 51
Discharge: HOME | End: 2021-01-19

## 2021-01-20 ENCOUNTER — HOSPITAL ENCOUNTER (OUTPATIENT)
Dept: HYPERBARIC MEDICINE | Age: 51
Discharge: HOME | End: 2021-01-20
Payer: COMMERCIAL

## 2021-01-20 VITALS
DIASTOLIC BLOOD PRESSURE: 79 MMHG | SYSTOLIC BLOOD PRESSURE: 120 MMHG | TEMPERATURE: 97.7 F | HEART RATE: 79 BPM | RESPIRATION RATE: 16 BRPM

## 2021-01-20 DIAGNOSIS — C09.9 TONSIL CANCER (HCC): ICD-10-CM
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
## 2021-01-20 DIAGNOSIS — M27.2 OSTEORADIONECROSIS OF JAW: Primary | ICD-10-CM

## 2021-01-20 PROCEDURE — G0277 HBOT, FULL BODY CHAMBER, 30M: HCPCS

## 2021-01-20 NOTE — PROGRESS NOTES
111 [facility]4Th [facility]  Hyperbaric Oxygen Therapy   Progress Note      NAME: Ulices Jewell RECORD NUMBER:  [id number]  AGE: 48 y.o. GENDER: female  : 1970  EPISODE DATE:  2021     Subjective     HBO Treatment Number: 1 out of Total Treatments: 30    HBO Diagnosis:     Indications: Osteoradionecrosis of the Jaw(left law)        Safety checks performed prior to treatment. See doc flowsheets for documentation. Objective           No results for input(s): POCGLU in the last 72 hours. Pre treatment Vital Signs       Temp: 96.5 °F (35.8 °C)     Pulse: 91     Resp: 16     BP: 130/84     Blood Sugar N/A    Post treatment Vital Signs  Temp: 97.7 °F (36.5 °C)  Pulse: 79  Resp: 16  BP: 120/79  Blood Sugar N/A    Assessment        Physical Exam:  General Appearance:  alert and oriented to person, place and time, well-developed and well-nourished, in no acute distress    Pre Tympanic Membrane Assessment:  tympanic membranes intact bilaterally, normal color, normal light reflex bilaterally    Post Tympanic Membrane Assessment:  Right: Normal(per Pt.)  Left: Normal(per Pt.)    Pulmonary/Chest:  clear to auscultation bilaterally- no wheezes, rales or rhonchi, normal air movement, no respiratory distress    Cardiovascular:  regular rate and rhythm, no murmurs rubs or gallops    Chamber #: [id number]       Treatment Start Time: 0804     Pressure Reached Time: 0826  MARIA GUADALUPE : 2.5  Number of Air Breaks:  Treatment Status: Air break X 2 for 5 minutes each      Decompression Time: 1006   Treatment End Time: 1029    Symptoms Noted During Treatment: None    TOTAL TIME AT DEPTH-100    TOTAL TIME IN CHAMBER-145    Adverse Event: no      I was present on these premises and immediately available to furnish assistance & direction throughout the procedure.        Imelda Woodall is a 48 y.o. female  did successfully complete today's hyperbaric oxygen treatment at [facility] [address] [facility]. In my clinical judgement, ongoing HBO therapy is  necessary at this time, given a threat to patient function, limb or life from the current condition. Supervision and attendance of Hyperbaric Oxygen Therapy provided. Continue HBO treatment as outlined in the treatment plan. Hyperbaric Oxygen: Luis Jorgensen tolerated Treatment Number: 1 well today without complications.      Electronically signed by Janina Villarreal MD on 1/20/2021 at 12:48 PM

## 2021-01-21 ENCOUNTER — HOSPITAL ENCOUNTER (OUTPATIENT)
Dept: HYPERBARIC MEDICINE | Age: 51
Discharge: HOME | End: 2021-01-21
Payer: COMMERCIAL

## 2021-01-21 VITALS
DIASTOLIC BLOOD PRESSURE: 79 MMHG | SYSTOLIC BLOOD PRESSURE: 127 MMHG | TEMPERATURE: 98.3 F | HEART RATE: 69 BPM | RESPIRATION RATE: 16 BRPM

## 2021-01-21 DIAGNOSIS — C09.9 TONSIL CANCER (HCC): ICD-10-CM
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
## 2021-01-21 DIAGNOSIS — M27.2 OSTEORADIONECROSIS OF JAW: Primary | ICD-10-CM

## 2021-01-21 PROCEDURE — G0277 HBOT, FULL BODY CHAMBER, 30M: HCPCS

## 2021-01-21 NOTE — PROGRESS NOTES
111 [facility]4[address] [facility]  Hyperbaric Oxygen Therapy   Progress Note      NAME: Candie Lazaro RECORD NUMBER:  [id number]  AGE: 48 y.o. GENDER: female  : 1970  EPISODE DATE:  2021     Subjective     HBO Treatment Number: 2 out of Total Treatments: 30    HBO Diagnosis:     Indications: Osteoradionecrosis of the Jaw(left jaw)        Safety checks performed prior to treatment. See doc flowsheets for documentation. Objective           No results for input(s): POCGLU in the last 72 hours. Pre treatment Vital Signs       Temp: 97.2 °F (36.2 °C)     Pulse: 76     Resp: 16     BP: 133/89     Blood Sugar NA    Post treatment Vital Signs  Temp: 98.3 °F (36.8 °C)  Pulse: 69  Resp: 16  BP: 127/79  Blood Sugar NA    Assessment        Physical Exam:  General Appearance:  alert and oriented to person, place and time, well-developed and well-nourished, in no acute distress and alert and oriented to person, place and time    Pre Tympanic Membrane Assessment:  tympanic membranes intact bilaterally, normal color    Post Tympanic Membrane Assessment:  Right: Normal(per Pt.)  Left: Normal(per Pt.)    Pulmonary/Chest:  clear to auscultation bilaterally- no wheezes, rales or rhonchi, normal air movement, no respiratory distress and no chest wall tenderness    Cardiovascular:  normal, regular rate and rhythm    Chamber #: [id number]       Treatment Start Time: 0807     Pressure Reached Time: 0827  MARIA GUADALUPE : 2.5  Number of Air Breaks:  Treatment Status: Air break X 2 for 5 minutes each      Decompression Time: 1007   Treatment End Time: 1026    Symptoms Noted During Treatment: None    TOTAL TIME AT DEPTH-100    TOTAL TIME IN CHAMBER-139    Adverse Event: no      I was present on these premises and immediately available to furnish assistance & direction throughout the procedure.        Mayra Sharma is a 48 y.o. female  did successfully complete today's hyperbaric oxygen treatment at [address] [place] [address] [employer]. In my clinical judgement, ongoing HBO therapy is  necessary at this time, given a threat to patient function, limb or life from the current condition. Supervision and attendance of Hyperbaric Oxygen Therapy provided. Continue HBO treatment as outlined in the treatment plan. Hyperbaric Oxygen: Francisco Nadia tolerated Treatment Number: 2 well today without complications.      Electronically signed by Ady Cunha MD on 1/21/2021 at 11:37 AM

## 2021-01-22 ENCOUNTER — HOSPITAL ENCOUNTER (OUTPATIENT)
Dept: HYPERBARIC MEDICINE | Age: 51
Discharge: HOME | End: 2021-01-22
Payer: COMMERCIAL

## 2021-01-22 VITALS
TEMPERATURE: 98.4 F | SYSTOLIC BLOOD PRESSURE: 118 MMHG | DIASTOLIC BLOOD PRESSURE: 77 MMHG | HEART RATE: 67 BPM | RESPIRATION RATE: 16 BRPM

## 2021-01-22 DIAGNOSIS — C09.9 TONSIL CANCER (HCC): ICD-10-CM
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
## 2021-01-22 DIAGNOSIS — M27.2 OSTEORADIONECROSIS OF JAW: Primary | ICD-10-CM

## 2021-01-22 PROCEDURE — G0277 HBOT, FULL BODY CHAMBER, 30M: HCPCS

## 2021-01-22 ASSESSMENT — PAIN DESCRIPTION - FREQUENCY: FREQUENCY: INTERMITTENT

## 2021-01-22 ASSESSMENT — PAIN DESCRIPTION - LOCATION: LOCATION: JAW

## 2021-01-22 ASSESSMENT — PAIN DESCRIPTION - PAIN TYPE: TYPE: ACUTE PAIN

## 2021-01-25 ENCOUNTER — HOSPITAL ENCOUNTER (OUTPATIENT)
Dept: HYPERBARIC MEDICINE | Age: 51
Discharge: HOME | End: 2021-01-25
Payer: COMMERCIAL

## 2021-01-25 VITALS
HEART RATE: 81 BPM | TEMPERATURE: 97.7 F | DIASTOLIC BLOOD PRESSURE: 90 MMHG | RESPIRATION RATE: 16 BRPM | SYSTOLIC BLOOD PRESSURE: 138 MMHG

## 2021-01-25 DIAGNOSIS — M27.2 OSTEORADIONECROSIS OF JAW: Primary | ICD-10-CM
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
## 2021-01-25 DIAGNOSIS — C09.9 TONSIL CANCER (HCC): ICD-10-CM

## 2021-01-25 PROCEDURE — G0277 HBOT, FULL BODY CHAMBER, 30M: HCPCS

## 2021-01-25 PROCEDURE — 99183 HYPERBARIC OXYGEN THERAPY: CPT | Performed by: NURSE PRACTITIONER

## 2021-01-25 NOTE — PROGRESS NOTES
111 [facility],4Th Floor  Hyperbaric Oxygen Therapy   Progress Note      NAME: Ant Bray RECORD NUMBER:  [id number]  AGE: 48 y.o. GENDER: female  : 1970  EPISODE DATE:  2021     Subjective     HBO Treatment Number: 4 out of Total Treatments: 30    HBO Diagnosis:     Indications: Osteoradionecrosis of the Jaw(left jaw)        Safety checks performed prior to treatment. See doc flowsheets for documentation. Objective           No results for input(s): POCGLU in the last 72 hours. Pre treatment Vital Signs       Temp: 96 °F (35.6 °C)     Pulse: 81     Resp: 16     BP: 136/89     Blood Sugar N/A    Post treatment Vital Signs  Temp: 97.7 °F (36.5 °C)  Pulse: 81  Resp: 16  BP: (!) 138/90(SN will inform ERICA Narayanan)  Blood Sugar N/A    Assessment        Physical Exam:  General Appearance:  alert and oriented to person, place and time, well-developed and well-nourished, in no acute distress    Pre Tympanic Membrane Assessment:  tympanic membranes intact bilaterally    Post Tympanic Membrane Assessment:  Right: Normal(per Pt.)  Left: Normal(per Pt.)    Pulmonary/Chest:  clear to auscultation bilaterally- no wheezes, rales or rhonchi, normal air movement, no respiratory distress    Cardiovascular:  normal, regular rate and rhythm    Chamber #: [id number]       Treatment Start Time: 0810     Pressure Reached Time: 0825  MARIA GUADALUPE : 2.5  Number of Air Breaks:  Treatment Status: Air break X 2 for 5 minutes each      Decompression Time: 1005   Treatment End Time: 1021    Symptoms Noted During Treatment: None    Adverse Event: no      Total time in chamber: 131  Minutes at depth: 100    I was present on these premises and immediately available to furnish assistance & direction throughout the procedure. Juan M Garcia is a 48 y.o. female  did successfully complete today's hyperbaric oxygen treatment at [address] [place] [address].     In my clinical judgement, ongoing HBO therapy is  necessary at this time, given a threat to patient function, limb or life from the current condition. Supervision and attendance of Hyperbaric Oxygen Therapy provided. Continue HBO treatment as outlined in the treatment plan. Hyperbaric Oxygen: Michelle Morrison tolerated Treatment Number: 4 well today without complications.      Electronically signed by [employer], APRN - CNP on 1/25/2021 at 1:29 PM

## 2021-01-26 ENCOUNTER — HOSPITAL ENCOUNTER (OUTPATIENT)
Dept: HYPERBARIC MEDICINE | Age: 51
Discharge: HOME | End: 2021-01-26
Payer: COMMERCIAL

## 2021-01-26 VITALS
SYSTOLIC BLOOD PRESSURE: 148 MMHG | DIASTOLIC BLOOD PRESSURE: 89 MMHG | HEART RATE: 81 BPM | RESPIRATION RATE: 16 BRPM | TEMPERATURE: 97.6 F

## 2021-01-26 DIAGNOSIS — M27.2 OSTEORADIONECROSIS OF JAW: Primary | ICD-10-CM

## 2021-01-26 DIAGNOSIS — C09.9 TONSIL CANCER (HCC): ICD-10-CM
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
## 2021-01-26 PROCEDURE — 99183 HYPERBARIC OXYGEN THERAPY: CPT | Performed by: NURSE PRACTITIONER

## 2021-01-26 PROCEDURE — G0277 HBOT, FULL BODY CHAMBER, 30M: HCPCS

## 2021-01-26 NOTE — PROGRESS NOTES
111 [facility],4Th Floor  Hyperbaric Oxygen Therapy   Progress Note      NAME: Laura Link RECORD NUMBER:  [id number]  AGE: 48 y.o. GENDER: female  : 1970  EPISODE DATE:  2021     Subjective     HBO Treatment Number: 5 out of Total Treatments: 30    HBO Diagnosis:     Indications: Osteoradionecrosis of the Jaw(left)        Safety checks performed prior to treatment. See doc flowsheets for documentation. Objective           No results for input(s): POCGLU in the last 72 hours. Pre treatment Vital Signs       Temp: 96.8 °F (36 °C)     Pulse: 85     Resp: 16     BP: 128/85     Blood Sugar N/A    Post treatment Vital Signs  Temp: 97.6 °F (36.4 °C)  Pulse: 81  Resp: 16  BP: (!) 148/89(SN will report to Genie Cooks, CNP)  Blood Sugar N/A    Assessment        Physical Exam:  General Appearance:  alert and oriented to person, place and time, well-developed and well-nourished, in no acute distress    Pre Tympanic Membrane Assessment:  tympanic membranes intact bilaterally    Post Tympanic Membrane Assessment:  Right: Normal(Per Pt.)  Left: Normal(Per Pt.)    Pulmonary/Chest:  clear to auscultation bilaterally- no wheezes, rales or rhonchi, normal air movement, no respiratory distress    Cardiovascular:  normal, regular rate and rhythm    Chamber #: [id number]       Treatment Start Time: 0814     Pressure Reached Time: 0827  MARIA GUADALUPE : 2.5  Number of Air Breaks:  Treatment Status: Air break X 2 for 5 minutes each      Decompression Time: 0948   Treatment End Time: 1001    Symptoms Noted During Treatment: None    Adverse Event: no      Total time in chamber:107  Minutes at depth: 81    I was present on these premises and immediately available to furnish assistance & direction throughout the procedure. Lupe Bagley is a 48 y.o. female  did successfully complete today's hyperbaric oxygen treatment at [address] [place] [address].     In my clinical judgement, ongoing HBO therapy is  necessary at this time, given a threat to patient function, limb or life from the current condition. Supervision and attendance of Hyperbaric Oxygen Therapy provided. Continue HBO treatment as outlined in the treatment plan. Hyperbaric Oxygen: Sixto Ontiveros tolerated Treatment Number: 5 well today without complications.      Electronically signed by JAIME Jones CNP on 1/26/2021 at 12:34 PM

## 2021-01-27 ENCOUNTER — HOSPITAL ENCOUNTER (OUTPATIENT)
Dept: HYPERBARIC MEDICINE | Age: 51
Discharge: HOME | End: 2021-01-27
Payer: COMMERCIAL

## 2021-01-27 VITALS
DIASTOLIC BLOOD PRESSURE: 78 MMHG | SYSTOLIC BLOOD PRESSURE: 122 MMHG | RESPIRATION RATE: 16 BRPM | HEART RATE: 71 BPM | TEMPERATURE: 97.7 F

## 2021-01-27 DIAGNOSIS — C09.9 TONSIL CANCER (HCC): ICD-10-CM

## 2021-01-27 DIAGNOSIS — M27.2 OSTEORADIONECROSIS OF JAW: Primary | ICD-10-CM
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
## 2021-01-27 PROCEDURE — G0277 HBOT, FULL BODY CHAMBER, 30M: HCPCS

## 2021-01-27 NOTE — PROGRESS NOTES
111 [facility] [place]  Hyperbaric Oxygen Therapy   Progress Note      NAME: Abigail Thomson RECORD NUMBER:  [id number]  AGE: 48 y.o. GENDER: female  : 1970  EPISODE DATE:  2021     Subjective     HBO Treatment Number: 6 out of Total Treatments: 30    HBO Diagnosis:     Indications: Osteoradionecrosis of the Jaw(left)        Safety checks performed prior to treatment. See doc flowsheets for documentation. Objective           No results for input(s): POCGLU in the last 72 hours. Pre treatment Vital Signs       Temp: 96.4 °F (35.8 °C)     Pulse: 92     Resp: 16     BP: 134/84     Blood Sugar N/A    Post treatment Vital Signs  Temp: 97.7 °F (36.5 °C)  Pulse: 71  Resp: 16  BP: 122/78  Blood Sugar N/A    Assessment        Physical Exam:  General Appearance:  alert and oriented to person, place and time, well-developed and well-nourished, in no acute distress    Pre Tympanic Membrane Assessment:  tympanic membranes intact bilaterally, normal color, normal light reflex bilaterally    Post Tympanic Membrane Assessment:  Right: Normal(per Pt.)  Left: Normal(per Pt.)    Pulmonary/Chest:  clear to auscultation bilaterally- no wheezes, rales or rhonchi, normal air movement, no respiratory distress    Cardiovascular:  regular rate and rhythm, no murmurs rubs or gallops    Chamber #: [id number]       Treatment Start Time: 813(Treatment started late because patient arrived late; talked with the physician about a family member prior to treatment starting)     Pressure Reached Time: 0825  MARIA GUADALUPE : 2.5  Number of Air Breaks:  Treatment Status: Air break X 2 for 5 minutes each      Decompression Time: 957   Treatment End Time: 1010    Symptoms Noted During Treatment: None    TOTAL TIME AT DEPTH-92    TOTAL TIME IN AQXWHOP-517    Adverse Event: no      I was present on these premises and immediately available to furnish assistance & direction throughout the procedure.        Plan Naida Masker is a 48 y.o. female  did successfully complete today's hyperbaric oxygen treatment at [address] [place] [address]. In my clinical judgement, ongoing HBO therapy is  necessary at this time, given a threat to patient function, limb or life from the current condition. Supervision and attendance of Hyperbaric Oxygen Therapy provided. Continue HBO treatment as outlined in the treatment plan. Hyperbaric Oxygen: Naida Masker tolerated Treatment Number: 6 well today without complications.      Electronically signed by Carmen Garcia MD on 1/27/2021 at 5:11 PM

## 2021-01-28 ENCOUNTER — HOSPITAL ENCOUNTER (OUTPATIENT)
Dept: HYPERBARIC MEDICINE | Age: 51
Discharge: HOME | End: 2021-01-28
Payer: COMMERCIAL

## 2021-01-28 VITALS
TEMPERATURE: 96.6 F | HEART RATE: 73 BPM | RESPIRATION RATE: 16 BRPM | SYSTOLIC BLOOD PRESSURE: 133 MMHG | DIASTOLIC BLOOD PRESSURE: 87 MMHG
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
## 2021-01-28 DIAGNOSIS — C09.9 TONSIL CANCER (HCC): ICD-10-CM

## 2021-01-28 DIAGNOSIS — M27.2 OSTEORADIONECROSIS OF JAW: Primary | ICD-10-CM

## 2021-01-28 PROCEDURE — G0277 HBOT, FULL BODY CHAMBER, 30M: HCPCS

## 2021-01-28 ASSESSMENT — PAIN DESCRIPTION - LOCATION: LOCATION: JAW

## 2021-01-28 ASSESSMENT — PAIN - FUNCTIONAL ASSESSMENT: PAIN_FUNCTIONAL_ASSESSMENT: ACTIVITIES ARE NOT PREVENTED

## 2021-01-28 ASSESSMENT — PAIN DESCRIPTION - ORIENTATION: ORIENTATION: LEFT

## 2021-01-28 ASSESSMENT — PAIN DESCRIPTION - PAIN TYPE: TYPE: ACUTE PAIN

## 2021-01-28 ASSESSMENT — PAIN SCALES - GENERAL: PAINLEVEL_OUTOF10: 2

## 2021-01-28 NOTE — PROGRESS NOTES
Patient instructed to report signs and/or symptoms of chest pain, eye or ear pain, SOB, or any other issues or concerns. Patient verbalized understanding. Patient tolerated treatment well, complaint of left jaw pain 2/10 pre and no pain post treatment.
oxygen treatment at [address] [place] [address] [place]. In my clinical judgement, ongoing HBO therapy is  necessary at this time, given a threat to patient function, limb or life from the current condition. Supervision and attendance of Hyperbaric Oxygen Therapy provided. Continue HBO treatment as outlined in the treatment plan. Hyperbaric Oxygen: Darrius Woodall tolerated Treatment Number: 7 well today without complications.      Electronically signed by Patricia Membreno MD on 1/28/2021 at 11:17 AM

## 2021-01-28 NOTE — PLAN OF CARE
Problem: Pain:  Goal: Pain level will decrease  Description: Pain level will decrease  Outcome: Ongoing  Goal: Control of acute pain  Description: Control of acute pain  Outcome: Ongoing  Goal: Control of chronic pain  Description: Control of chronic pain  Outcome: Ongoing     Problem: Physical Regulation:  Goal: Tolerate HBO therapy and barotrauma will be prevented  Description: Tolerate HBO therapy and barotrauma will be prevented  Outcome: Ongoing     Problem: Physical Regulation:  Intervention: Assess for signs and symptoms of adverse events including confinement anxiety, pneumothorax, oxygen toxicity, and barotrauma  Note: Patient tolerated treatment well. Intervention: Assess knowledge and expectations of HBO therapy  Note: Patient tolerated treatment well. Intervention: Assess for history of confinement anxiety  Note: Patient tolerated treatment well. Intervention: Provide comfort related to the HBO environment and equalizaton of middle ear  Note: Patient tolerated treatment well. Intervention: Monitor for symptoms of seizure  Note: Patient tolerated treatment well.

## 2021-01-29 ENCOUNTER — HOSPITAL ENCOUNTER (OUTPATIENT)
Dept: HYPERBARIC MEDICINE | Age: 51
Discharge: HOME | End: 2021-01-29
Payer: COMMERCIAL

## 2021-01-29 VITALS
HEART RATE: 79 BPM | TEMPERATURE: 97.7 F | RESPIRATION RATE: 16 BRPM | DIASTOLIC BLOOD PRESSURE: 80 MMHG | SYSTOLIC BLOOD PRESSURE: 121 MMHG

## 2021-01-29 DIAGNOSIS — M27.2 OSTEORADIONECROSIS OF JAW: Primary | ICD-10-CM

## 2021-01-29 DIAGNOSIS — C09.9 TONSIL CANCER (HCC): ICD-10-CM
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
## 2021-01-29 PROCEDURE — G0277 HBOT, FULL BODY CHAMBER, 30M: HCPCS

## 2021-01-29 NOTE — PROGRESS NOTES
[facility]  Hyperbaric Oxygen Therapy   Progress Note      NAME: Abigail Thomson RECORD NUMBER:  [id number]  AGE: 48 y.o. GENDER: female  : 1970  EPISODE DATE:  2021     Subjective     HBO Treatment Number: 8 out of Total Treatments: 30    HBO Diagnosis:     Indications: Osteoradionecrosis of the Jaw(left jaw)        Safety checks performed prior to treatment. See doc flowsheets for documentation. Objective           No results for input(s): POCGLU in the last 72 hours. Pre treatment Vital Signs       Temp: 96.9 °F (36.1 °C)     Pulse: 84     Resp: 16     BP: 127/77     Blood Sugar N/A    Post treatment Vital Signs  Temp: 97.7 °F (36.5 °C)  Pulse: 79  Resp: 16  BP: 121/80  Blood Sugar N/A    Assessment        Physical Exam:  General Appearance:  alert and oriented to person, place and time, well-developed and well-nourished, in no acute distress    Pre Tympanic Membrane Assessment:  tympanic membranes intact bilaterally    Post Tympanic Membrane Assessment:  Right: Normal(per Pt.)  Left: Normal(per Pt.)    Pulmonary/Chest:  clear to auscultation bilaterally- no wheezes, rales or rhonchi, normal air movement, no respiratory distress    Cardiovascular:  normal, regular rate and rhythm    Chamber #: [id number]       Treatment Start Time: 0811     Pressure Reached Time: 0823  MARIA GUADALUPE : 2.5  Number of Air Breaks:  Treatment Status: Air break X 2 for 5 minutes each      Decompression Time: 1003   Treatment End Time: 1015    Symptoms Noted During Treatment: None    Adverse Event: no      I was present on these premises and immediately available to furnish assistance & direction throughout the procedure. Marina Santos is a 48 y.o. female  did successfully complete today's hyperbaric oxygen treatment at [address] [place] [address].     In my clinical judgement, ongoing HBO therapy is  necessary at this time, given a threat to patient function, limb or life from the current condition. Supervision and attendance of Hyperbaric Oxygen Therapy provided. Continue HBO treatment as outlined in the treatment plan. Hyperbaric Oxygen: Alyssia Weber tolerated Treatment Number: 8 well today without complications.      Electronically signed by JAIME Perez CNP on 1/29/2021 at 12:36 PM

## 2021-02-01 ENCOUNTER — HOSPITAL ENCOUNTER (OUTPATIENT)
Dept: HYPERBARIC MEDICINE | Age: 51
Discharge: HOME | End: 2021-02-01
Payer: COMMERCIAL

## 2021-02-01 VITALS
HEART RATE: 81 BPM | SYSTOLIC BLOOD PRESSURE: 140 MMHG | TEMPERATURE: 97.7 F | RESPIRATION RATE: 16 BRPM | DIASTOLIC BLOOD PRESSURE: 90 MMHG

## 2021-02-01 DIAGNOSIS — C09.9 TONSIL CANCER (HCC): ICD-10-CM

## 2021-02-01 DIAGNOSIS — M27.2 OSTEORADIONECROSIS OF JAW: Primary | ICD-10-CM
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
## 2021-02-01 PROCEDURE — 99183 HYPERBARIC OXYGEN THERAPY: CPT | Performed by: NURSE PRACTITIONER

## 2021-02-01 PROCEDURE — G0277 HBOT, FULL BODY CHAMBER, 30M: HCPCS

## 2021-02-01 ASSESSMENT — PAIN DESCRIPTION - ONSET: ONSET: ON-GOING

## 2021-02-01 ASSESSMENT — PAIN DESCRIPTION - FREQUENCY: FREQUENCY: CONTINUOUS

## 2021-02-01 ASSESSMENT — PAIN DESCRIPTION - LOCATION: LOCATION: HEAD

## 2021-02-01 ASSESSMENT — PAIN SCALES - GENERAL: PAINLEVEL_OUTOF10: 4

## 2021-02-01 NOTE — PROGRESS NOTES
111 [facility],4Th Floor  Hyperbaric Oxygen Therapy   Progress Note      NAME: Kate Bernardo RECORD NUMBER:  [id number]  AGE: 48 y.o. GENDER: female  : 1970  EPISODE DATE:  2021     Subjective     HBO Treatment Number: 9 out of Total Treatments: 30    HBO Diagnosis:     Indications: Osteoradionecrosis of the Jaw(left jaw)        Safety checks performed prior to treatment. See doc flowsheets for documentation. Objective           No results for input(s): POCGLU in the last 72 hours. Pre treatment Vital Signs       Temp: 97.1 °F (36.2 °C)     Pulse: 87     Resp: 16     BP: 123/81     Blood Sugar N/A    Post treatment Vital Signs  Temp: 97.7 °F (36.5 °C)  Pulse: 81  Resp: 16  BP: (!) 140/90  Blood Sugar N/A    Assessment        Physical Exam:  General Appearance:  alert and oriented to person, place and time, well-developed and well-nourished, in no acute distress    Pre Tympanic Membrane Assessment:  tympanic membranes intact bilaterally    Post Tympanic Membrane Assessment:  Right: Normal(per Pt.)  Left: Normal(per Pt.)    Pulmonary/Chest:  clear to auscultation bilaterally- no wheezes, rales or rhonchi, normal air movement, no respiratory distress    Cardiovascular:  normal, regular rate and rhythm    Chamber #: [id number]       Treatment Start Time: 0820     Pressure Reached Time: 0832  MARIA GUADALUPE : 2.5  Number of Air Breaks:  Treatment Status: Other (Comment)(Air break x 3 for 5 minutes each)      Decompression Time: 1017   Treatment End Time: 4741    Symptoms Noted During Treatment: None    Adverse Event: no      Total time in chamber: 129  Minutes at depth: 105    I was present on these premises and immediately available to furnish assistance & direction throughout the procedure. Meg Michaels is a 48 y.o. female  did successfully complete today's hyperbaric oxygen treatment at [address] [place] [address].     In my clinical judgement, ongoing HBO therapy is  necessary at this time, given a threat to patient function, limb or life from the current condition. Supervision and attendance of Hyperbaric Oxygen Therapy provided. Continue HBO treatment as outlined in the treatment plan. Hyperbaric Oxygen: Efrem Payne tolerated Treatment Number: 9 well today without complications.      Electronically signed by JAIME More CNP on 2/1/2021 at 12:59 PM

## 2021-02-02 ENCOUNTER — HOSPITAL ENCOUNTER (OUTPATIENT)
Dept: HYPERBARIC MEDICINE | Age: 51
Discharge: HOME | End: 2021-02-02
Payer: COMMERCIAL

## 2021-02-02 VITALS
HEART RATE: 87 BPM | TEMPERATURE: 98 F | RESPIRATION RATE: 16 BRPM | SYSTOLIC BLOOD PRESSURE: 140 MMHG | DIASTOLIC BLOOD PRESSURE: 87 MMHG
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
## 2021-02-02 DIAGNOSIS — C09.9 TONSIL CANCER (HCC): ICD-10-CM

## 2021-02-02 DIAGNOSIS — M27.2 OSTEORADIONECROSIS OF JAW: Primary | ICD-10-CM

## 2021-02-02 PROCEDURE — G0277 HBOT, FULL BODY CHAMBER, 30M: HCPCS

## 2021-02-02 PROCEDURE — 99183 HYPERBARIC OXYGEN THERAPY: CPT | Performed by: NURSE PRACTITIONER

## 2021-02-02 NOTE — PROGRESS NOTES
[facility]  Hyperbaric Oxygen Therapy   Progress Note      NAME: Harsh Murphy RECORD NUMBER:  [id number]  AGE: 48 y.o. GENDER: female  : 1970  EPISODE DATE:  2021     Subjective     HBO Treatment Number: 10 out of Total Treatments: 30    HBO Diagnosis:     Indications: Osteoradionecrosis of the Jaw(left jaw)        Safety checks performed prior to treatment. See doc flowsheets for documentation. Objective           No results for input(s): POCGLU in the last 72 hours. Pre treatment Vital Signs       Temp: 96.5 °F (35.8 °C)     Pulse: 96     Resp: 16     BP: 118/78     Blood Sugar N/A    Post treatment Vital Signs  Temp: 98 °F (36.7 °C)  Pulse: 87  Resp: 16  BP: (!) 140/87(SN will inform ERICA Narayanan)  Blood Sugar N/A    Assessment        Physical Exam:  General Appearance:  alert and oriented to person, place and time, well-developed and well-nourished, in no acute distress    Pre Tympanic Membrane Assessment:  tympanic membranes intact bilaterally    Post Tympanic Membrane Assessment:  Right: Normal(per Pt.)  Left: Normal(per Pt.)    Pulmonary/Chest:  clear to auscultation bilaterally- no wheezes, rales or rhonchi, normal air movement, no respiratory distress    Cardiovascular:  normal, regular rate and rhythm    Chamber #: [id number]       Treatment Start Time: 0810     Pressure Reached Time: 0822  MARIA GUADALUPE : 2.5  Number of Air Breaks:  Treatment Status: Air break X 2 for 5 minutes each      Decompression Time: 1002   Treatment End Time: 7182    Symptoms Noted During Treatment: None    Adverse Event: no      Total time in chamber: 124  Minutes at depth: 100    I was present on these premises and immediately available to furnish assistance & direction throughout the procedure. Sourav Sylvester is a 48 y.o. female  did successfully complete today's hyperbaric oxygen treatment at [address] [place] [address] [employer].     In my clinical judgement, ongoing HBO therapy is  necessary at this time, given a threat to patient function, limb or life from the current condition. Supervision and attendance of Hyperbaric Oxygen Therapy provided. Continue HBO treatment as outlined in the treatment plan. Hyperbaric Oxygen: Randi Hyatt tolerated Treatment Number: 10 well today without complications.      Electronically signed by JAIME Morales CNP on 2/2/2021 at 12:44 PM

## 2021-03-15 ENCOUNTER — TELEPHONE (OUTPATIENT)
Dept: INTERNAL MEDICINE CLINIC | Age: 51
End: 2021-03-15

## 2021-03-15 DIAGNOSIS — R73.03 PREDIABETES: ICD-10-CM

## 2021-03-15 DIAGNOSIS — Z00.00 GENERAL MEDICAL EXAM: Primary | ICD-10-CM

## 2021-03-15 NOTE — TELEPHONE ENCOUNTER
Left msg informing pt lab orders will be mail. If she is using a [employer] facility orders is not needed.

## 2021-03-16 ENCOUNTER — HOSPITAL ENCOUNTER (OUTPATIENT)
Dept: HYPERBARIC MEDICINE | Age: 51
Discharge: HOME | End: 2021-03-16
Payer: COMMERCIAL

## 2021-03-16 VITALS
RESPIRATION RATE: 16 BRPM | TEMPERATURE: 97.9 F | DIASTOLIC BLOOD PRESSURE: 83 MMHG | SYSTOLIC BLOOD PRESSURE: 132 MMHG | HEART RATE: 85 BPM

## 2021-03-16 DIAGNOSIS — M27.2 OSTEORADIONECROSIS OF JAW: Primary | ICD-10-CM

## 2021-03-16 DIAGNOSIS — C09.9 TONSIL CANCER (HCC): ICD-10-CM
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
## 2021-03-16 PROCEDURE — G0277 HBOT, FULL BODY CHAMBER, 30M: HCPCS

## 2021-03-16 PROCEDURE — 99183 HYPERBARIC OXYGEN THERAPY: CPT | Performed by: NURSE PRACTITIONER

## 2021-03-16 NOTE — PROGRESS NOTES
111 [facility],4Th Floor  Hyperbaric Oxygen Therapy   Progress Note      NAME: Kate Bernardo RECORD NUMBER:  [id number]  AGE: 48 y.o. GENDER: female  : 1970  EPISODE DATE:  3/16/2021     Subjective     HBO Treatment Number: 11 out of Total Treatments: 30    HBO Diagnosis:     Indications: Osteoradionecrosis of the Jaw(left)        Safety checks performed prior to treatment. See doc flowsheets for documentation. Objective           No results for input(s): POCGLU in the last 72 hours. Pre treatment Vital Signs       Temp: 96.9 °F (36.1 °C)     Pulse: 93     Resp: 16     BP: 117/79     Blood Sugar N/A    Post treatment Vital Signs  Temp: 97.9 °F (36.6 °C)  Pulse: 85  Resp: 16  BP: 132/83  Blood Sugar N/A    Assessment        Physical Exam:  General Appearance:  alert and oriented to person, place and time, well-developed and well-nourished, in no acute distress    Pre Tympanic Membrane Assessment:  tympanic membranes intact bilaterally    Post Tympanic Membrane Assessment:  Right: Normal(per Pt.)  Left: Normal(per Pt.)    Pulmonary/Chest:  clear to auscultation bilaterally- no wheezes, rales or rhonchi, normal air movement, no respiratory distress    Cardiovascular:  normal, regular rate and rhythm    Chamber #: [id number]       Treatment Start Time: 0807     Pressure Reached Time: 0822  MARIA GUADALUPE : 2.5  Number of Air Breaks:  Treatment Status: Air break X 2 for 5 minutes each      Decompression Time: 1002   Treatment End Time: 1015    Symptoms Noted During Treatment: None    Adverse Event: no      Total time in chamber:128  Minutes at depth: 100    I was present on these premises and immediately available to furnish assistance & direction throughout the procedure. Meg Michaels is a 48 y.o. female  did successfully complete today's hyperbaric oxygen treatment at [address] [place] [address].     In my clinical judgement, ongoing HBO therapy is necessary at this time, given a threat to patient function, limb or life from the current condition. Supervision and attendance of Hyperbaric Oxygen Therapy provided. Continue HBO treatment as outlined in the treatment plan. Hyperbaric Oxygen: Amanda Langston tolerated Treatment Number: 42 well today without complications.      Electronically signed by JAIME Steen CNP on 3/16/2021 at 6:10 PM

## 2021-03-17 ENCOUNTER — HOSPITAL ENCOUNTER (OUTPATIENT)
Dept: HYPERBARIC MEDICINE | Age: 51
Discharge: HOME | End: 2021-03-17
Payer: COMMERCIAL

## 2021-03-17 VITALS
RESPIRATION RATE: 16 BRPM | DIASTOLIC BLOOD PRESSURE: 77 MMHG | TEMPERATURE: 97.9 F | SYSTOLIC BLOOD PRESSURE: 128 MMHG | HEART RATE: 87 BPM
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
## 2021-03-17 DIAGNOSIS — C09.9 TONSIL CANCER (HCC): ICD-10-CM

## 2021-03-17 DIAGNOSIS — M27.2 OSTEORADIONECROSIS OF JAW: Primary | ICD-10-CM

## 2021-03-17 PROCEDURE — G0277 HBOT, FULL BODY CHAMBER, 30M: HCPCS

## 2021-03-17 NOTE — PLAN OF CARE
Patient watched T.V. during most of the treatment ; rested with eyes closed at times . No complaints reported.

## 2021-03-17 NOTE — PROGRESS NOTES
[facility]  Hyperbaric Oxygen Therapy   Progress Note      NAME: Sumi Clark RECORD NUMBER:  [id number]  AGE: 48 y.o. GENDER: female  : 1970  EPISODE DATE:  3/17/2021     Subjective     HBO Treatment Number: 12 out of Total Treatments: 30    HBO Diagnosis:     Indications: Osteoradionecrosis of the Jaw(left)        Safety checks performed prior to treatment. See doc flowsheets for documentation. Objective           No results for input(s): POCGLU in the last 72 hours. Pre treatment Vital Signs       Temp: 96.5 °F (35.8 °C)     Pulse: 97     Resp: 18     BP: 128/88     Blood Sugar N/A    Post treatment Vital Signs  Temp: 97.9 °F (36.6 °C)  Pulse: 87  Resp: 16  BP: 128/77  Blood Sugar N/A    Assessment        Physical Exam:  General Appearance:  alert and oriented to person, place and time, well-developed and well-nourished, in no acute distress    Pre Tympanic Membrane Assessment:  tympanic membranes intact bilaterally, normal color, normal light reflex bilaterally    Post Tympanic Membrane Assessment:  Right: Normal(per Pt.)  Left: Normal(per Pt.)    Pulmonary/Chest:  clear to auscultation bilaterally- no wheezes, rales or rhonchi, normal air movement, no respiratory distress    Cardiovascular:  regular rate and rhythm, no murmurs rubs or gallops    Chamber #: [id number]       Treatment Start Time: 0813     Pressure Reached Time: 0827  MARIA GUADALUPE : 2.5  Number of Air Breaks:  Treatment Status: Air break X 2 for 5 minutes each      Decompression Time: 1007   Treatment End Time: 1021    Symptoms Noted During Treatment: None    TOTAL TIME AT DEPTH-100    TOTAL TIME IN CHAMBER-128    Adverse Event: no      I was present on these premises and immediately available to furnish assistance & direction throughout the procedure.        Jacqueline Negron is a 48 y.o. female  did successfully complete today's hyperbaric oxygen treatment at [facility] [address] [facility]. In my clinical judgement, ongoing HBO therapy is  necessary at this time, given a threat to patient function, limb or life from the current condition. Supervision and attendance of Hyperbaric Oxygen Therapy provided. Continue HBO treatment as outlined in the treatment plan. Hyperbaric Oxygen: Randi Hyatt tolerated Treatment Number: 12 well today without complications.      Electronically signed by Jah Myers MD on 3/17/2021 at 1:11 PM

## 2021-03-18 ENCOUNTER — HOSPITAL ENCOUNTER (OUTPATIENT)
Dept: HYPERBARIC MEDICINE | Age: 51
Discharge: HOME | End: 2021-03-18
Payer: COMMERCIAL

## 2021-03-18 VITALS
RESPIRATION RATE: 16 BRPM | SYSTOLIC BLOOD PRESSURE: 122 MMHG | TEMPERATURE: 98.4 F | DIASTOLIC BLOOD PRESSURE: 81 MMHG | HEART RATE: 87 BPM

## 2021-03-18 DIAGNOSIS — C09.9 TONSIL CANCER (HCC): ICD-10-CM
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
## 2021-03-18 DIAGNOSIS — M27.2 OSTEORADIONECROSIS OF JAW: Primary | ICD-10-CM

## 2021-03-18 PROCEDURE — G0277 HBOT, FULL BODY CHAMBER, 30M: HCPCS

## 2021-03-18 ASSESSMENT — PAIN - FUNCTIONAL ASSESSMENT: PAIN_FUNCTIONAL_ASSESSMENT: ACTIVITIES ARE NOT PREVENTED

## 2021-03-18 ASSESSMENT — PAIN DESCRIPTION - FREQUENCY: FREQUENCY: INTERMITTENT

## 2021-03-18 ASSESSMENT — PAIN DESCRIPTION - PROGRESSION: CLINICAL_PROGRESSION: GRADUALLY IMPROVING

## 2021-03-18 ASSESSMENT — PAIN DESCRIPTION - PAIN TYPE: TYPE: ACUTE PAIN

## 2021-03-18 NOTE — PROGRESS NOTES
successfully complete today's hyperbaric oxygen treatment at [address] [place] [address] Camila Murray. In my clinical judgement, ongoing HBO therapy is  necessary at this time, given a threat to patient function, limb or life from the current condition. Supervision and attendance of Hyperbaric Oxygen Therapy provided. Continue HBO treatment as outlined in the treatment plan. Hyperbaric Oxygen: Efrem Payne tolerated Treatment Number: 70 well today without complications.      Electronically signed by Larry Aranda MD on 3/18/2021 at 12:00 PM

## 2021-03-20 ENCOUNTER — HOSPITAL ENCOUNTER (OUTPATIENT)
Age: 51
Discharge: HOME | End: 2021-03-20
Payer: COMMERCIAL

## 2021-03-20 LAB
ALBUMIN SERPL-MCNC: 4.1 GM/DL (ref 3.4–5)
ALP BLD-CCNC: 77 IU/L (ref 40–128)
ALT SERPL-CCNC: 27 U/L (ref 10–40)
ANION GAP SERPL CALCULATED.3IONS-SCNC: 9 MMOL/L (ref 4–16)
AST SERPL-CCNC: 21 IU/L (ref 15–37)
BILIRUB SERPL-MCNC: 0.5 MG/DL (ref 0–1)
BUN BLDV-MCNC: 12 MG/DL (ref 6–23)
CALCIUM SERPL-MCNC: 9.1 MG/DL (ref 8.3–10.6)
CHLORIDE BLD-SCNC: 103 MMOL/L (ref 99–110)
CO2: 28 MMOL/L (ref 21–32)
CREAT SERPL-MCNC: 1 MG/DL (ref 0.6–1.1)
ESTIMATED AVERAGE GLUCOSE: 114 MG/DL
GFR AFRICAN AMERICAN: >60 ML/MIN/1.73M2
GFR NON-AFRICAN AMERICAN: 59 ML/MIN/1.73M2
GLUCOSE BLD-MCNC: 112 MG/DL (ref 70–99)
HBA1C MFR BLD: 5.6 % (ref 4.2–6.3)
POTASSIUM SERPL-SCNC: 4.3 MMOL/L (ref 3.5–5.1)
SODIUM BLD-SCNC: 140 MMOL/L (ref 135–145)
TOTAL PROTEIN: 6.7 GM/DL (ref 6.4–8.2)
TSH HIGH SENSITIVITY: 1.51 UIU/ML (ref 0.27–4.2)

## 2021-03-20 PROCEDURE — 83036 HEMOGLOBIN GLYCOSYLATED A1C: CPT

## 2021-03-20 PROCEDURE — 36415 COLL VENOUS BLD VENIPUNCTURE: CPT

## 2021-03-20 PROCEDURE — 84443 ASSAY THYROID STIM HORMONE: CPT

## 2021-03-20 PROCEDURE — 80053 COMPREHEN METABOLIC PANEL: CPT

## 2021-03-22 ENCOUNTER — HOSPITAL ENCOUNTER (OUTPATIENT)
Dept: HYPERBARIC MEDICINE | Age: 51
Discharge: HOME | End: 2021-03-22
Payer: COMMERCIAL

## 2021-03-22 VITALS
SYSTOLIC BLOOD PRESSURE: 110 MMHG | TEMPERATURE: 97.8 F | RESPIRATION RATE: 16 BRPM | HEART RATE: 75 BPM | DIASTOLIC BLOOD PRESSURE: 71 MMHG

## 2021-03-22 DIAGNOSIS — C09.9 TONSIL CANCER (HCC): ICD-10-CM

## 2021-03-22 DIAGNOSIS — M27.2 OSTEORADIONECROSIS OF JAW: Primary | ICD-10-CM
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
## 2021-03-22 PROCEDURE — G0277 HBOT, FULL BODY CHAMBER, 30M: HCPCS

## 2021-03-22 PROCEDURE — 99183 HYPERBARIC OXYGEN THERAPY: CPT | Performed by: NURSE PRACTITIONER

## 2021-03-22 ASSESSMENT — PAIN DESCRIPTION - PAIN TYPE: TYPE: ACUTE PAIN

## 2021-03-22 ASSESSMENT — PAIN DESCRIPTION - PROGRESSION: CLINICAL_PROGRESSION: GRADUALLY IMPROVING

## 2021-03-22 ASSESSMENT — PAIN DESCRIPTION - ONSET: ONSET: SUDDEN

## 2021-03-22 ASSESSMENT — PAIN DESCRIPTION - ORIENTATION: ORIENTATION: LEFT

## 2021-03-22 ASSESSMENT — PAIN SCALES - GENERAL: PAINLEVEL_OUTOF10: 2

## 2021-03-22 NOTE — PROGRESS NOTES
[facility]  Hyperbaric Oxygen Therapy   Progress Note      NAME: Stephie Veliz RECORD NUMBER:  [id number]  AGE: 48 y.o. GENDER: female  : 1970  EPISODE DATE:  3/22/2021     Subjective     HBO Treatment Number: 14 out of Total Treatments: 30    HBO Diagnosis:     Indications: Osteoradionecrosis of the Jaw(left)        Safety checks performed prior to treatment. See doc flowsheets for documentation. Objective           No results for input(s): POCGLU in the last 72 hours. Pre treatment Vital Signs       Temp: 98.7 °F (37.1 °C)     Pulse: 102     Resp: 20     BP: 123/84     Blood Sugar N/A    Post treatment Vital Signs  Temp: 97.8 °F (36.6 °C)  Pulse: 75  Resp: 16  BP: 110/71  Blood Sugar N/A    Assessment        Physical Exam:  General Appearance:  alert and oriented to person, place and time, well-developed and well-nourished, in no acute distress    Pre Tympanic Membrane Assessment:  tympanic membranes intact bilaterally    Post Tympanic Membrane Assessment:  Right: Normal(per Pt.)  Left: Normal(per Pt.)    Pulmonary/Chest:  clear to auscultation bilaterally- no wheezes, rales or rhonchi, normal air movement, no respiratory distress    Cardiovascular:  normal, regular rate and rhythm    Chamber #: [id number]       Treatment Start Time: 0809     Pressure Reached Time: 0824  MARIA GUADALUPE : 2.5  Number of Air Breaks:  Treatment Status: Air break X 2 for 5 minutes each      Decompression Time: 1001   Treatment End Time: 9528    Symptoms Noted During Treatment: None    Adverse Event: no      Total time in chamber: 125  Minutes at depth: 97    I was present on these premises and immediately available to furnish assistance & direction throughout the procedure. Chiara Kelly is a 48 y.o. female  did successfully complete today's hyperbaric oxygen treatment at [address] [place] [address].     In my clinical judgement, ongoing HBO therapy is necessary at this time, given a threat to patient function, limb or life from the current condition. Supervision and attendance of Hyperbaric Oxygen Therapy provided. Continue HBO treatment as outlined in the treatment plan. Hyperbaric Oxygen: Naida Masker tolerated Treatment Number: 14 well today without complications.      Electronically signed by JAIME Tolliver CNP on 3/22/2021 at 5:42 PM

## 2021-03-23 ENCOUNTER — HOSPITAL ENCOUNTER (OUTPATIENT)
Dept: HYPERBARIC MEDICINE | Age: 51
Discharge: HOME | End: 2021-03-23
Payer: COMMERCIAL

## 2021-03-23 VITALS
DIASTOLIC BLOOD PRESSURE: 85 MMHG | RESPIRATION RATE: 16 BRPM | HEART RATE: 89 BPM | SYSTOLIC BLOOD PRESSURE: 131 MMHG | TEMPERATURE: 97.7 F

## 2021-03-23 DIAGNOSIS — C09.9 TONSIL CANCER (HCC): ICD-10-CM
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
## 2021-03-23 DIAGNOSIS — M27.2 OSTEORADIONECROSIS OF JAW: Primary | ICD-10-CM

## 2021-03-23 PROCEDURE — G0277 HBOT, FULL BODY CHAMBER, 30M: HCPCS

## 2021-03-23 PROCEDURE — 99183 HYPERBARIC OXYGEN THERAPY: CPT | Performed by: NURSE PRACTITIONER

## 2021-03-23 NOTE — PROGRESS NOTES
[facility]  Hyperbaric Oxygen Therapy   Progress Note      NAME: Vitaly Green RECORD NUMBER:  [id number]  AGE: 48 y.o. GENDER: female  : 1970  EPISODE DATE:  3/23/2021     Subjective     HBO Treatment Number: 15 out of Total Treatments: 30    HBO Diagnosis:     Indications: Osteoradionecrosis of the Jaw(left)        Safety checks performed prior to treatment. See doc flowsheets for documentation. Objective           No results for input(s): POCGLU in the last 72 hours. Pre treatment Vital Signs       Temp: 97.9 °F (36.6 °C)     Pulse: 98     Resp: 16     BP: 118/84     Blood Sugar N/A    Post treatment Vital Signs  Temp: 97.7 °F (36.5 °C)  Pulse: 89  Resp: 16  BP: 131/85  Blood Sugar N/A    Assessment        Physical Exam:  General Appearance:  alert and oriented to person, place and time, well-developed and well-nourished, in no acute distress    Pre Tympanic Membrane Assessment:  tympanic membranes intact bilaterally    Post Tympanic Membrane Assessment:  Right: Normal(per Pt.)  Left: Normal(per Pt.)    Pulmonary/Chest:  clear to auscultation bilaterally- no wheezes, rales or rhonchi, normal air movement, no respiratory distress    Cardiovascular:  normal, regular rate and rhythm    Chamber #: [id number]       Treatment Start Time: 0809     Pressure Reached Time: 0824  MARIA GUADALUPE : 2.5  Number of Air Breaks:  Treatment Status: Air break X 2 for 5 minutes each      Decompression Time: 1004   Treatment End Time: 1015    Symptoms Noted During Treatment: None    Adverse Event: no      Total time in chamber: 126  Minutes at depth: 100    I was present on these premises and immediately available to furnish assistance & direction throughout the procedure. Clovis Heimlich is a 48 y.o. female  did successfully complete today's hyperbaric oxygen treatment at [address] [place] [address].     In my clinical judgement, ongoing HBO therapy is necessary at this time, given a threat to patient function, limb or life from the current condition. Supervision and attendance of Hyperbaric Oxygen Therapy provided. Continue HBO treatment as outlined in the treatment plan. Hyperbaric Oxygen: Gisela Maxwell tolerated Treatment Number: 15 well today without complications.      Electronically signed by JAIME Moffett CNP on 3/23/2021 at 12:22 PM

## 2021-03-23 NOTE — PLAN OF CARE
Pt. Tolerated treatment well; rested with eyes closed at times during treatment without signs of acute distress noted.

## 2021-03-24 ENCOUNTER — HOSPITAL ENCOUNTER (OUTPATIENT)
Dept: HYPERBARIC MEDICINE | Age: 51
Discharge: HOME | End: 2021-03-24
Payer: COMMERCIAL

## 2021-03-24 VITALS
HEART RATE: 76 BPM | TEMPERATURE: 97.8 F | RESPIRATION RATE: 16 BRPM | DIASTOLIC BLOOD PRESSURE: 77 MMHG | SYSTOLIC BLOOD PRESSURE: 124 MMHG
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
## 2021-03-24 DIAGNOSIS — C09.9 TONSIL CANCER (HCC): ICD-10-CM

## 2021-03-24 DIAGNOSIS — M27.2 OSTEORADIONECROSIS OF JAW: Primary | ICD-10-CM

## 2021-03-24 PROCEDURE — G0277 HBOT, FULL BODY CHAMBER, 30M: HCPCS

## 2021-03-24 NOTE — PROGRESS NOTES
[facility]  Hyperbaric Oxygen Therapy   Progress Note      NAME: Mary Jo Levy RECORD NUMBER:  [id number]  AGE: 48 y.o. GENDER: female  : 1970  EPISODE DATE:  3/24/2021     Subjective     HBO Treatment Number: 16 out of Total Treatments: 30    HBO Diagnosis:     Indications: Osteoradionecrosis of the Jaw(left)        Safety checks performed prior to treatment. See doc flowsheets for documentation. Objective           No results for input(s): POCGLU in the last 72 hours. Pre treatment Vital Signs       Temp: 96.2 °F (35.7 °C)     Pulse: 100     Resp: 16     BP: 121/80     Blood Sugar N?A    Post treatment Vital Signs  Temp: 97.8 °F (36.6 °C)  Pulse: 76  Resp: 16  BP: 124/77  Blood Sugar N/A    Assessment        Physical Exam:  General Appearance:  alert and oriented to person, place and time, well-developed and well-nourished, in no acute distress    Pre Tympanic Membrane Assessment:  tympanic membranes intact bilaterally, normal color, normal light reflex bilaterally    Post Tympanic Membrane Assessment:  Right: Normal(Per Pt.)  Left: Normal(Per Pt.)    Pulmonary/Chest:  clear to auscultation bilaterally- no wheezes, rales or rhonchi, normal air movement, no respiratory distress    Cardiovascular:  regular rate and rhythm, no murmurs rubs or gallops    Chamber #: [id number]       Treatment Start Time: 0815     Pressure Reached Time: 0830  MARIA GUADALUPE : 2.5  Number of Air Breaks:  Treatment Status: Air break X 2 for 5 minutes each      Decompression Time: 1010   Treatment End Time: 1025    Symptoms Noted During Treatment: None    TOTAL TIME AT DEPTH-100    TOTAL TIME IN CHAMBER-130    Adverse Event: no      I was present on these premises and immediately available to furnish assistance & direction throughout the procedure.        Ozzy Pearson is a 48 y.o. female  did successfully complete today's hyperbaric oxygen treatment at [facility] [address] [facility]. In my clinical judgement, ongoing HBO therapy is  necessary at this time, given a threat to patient function, limb or life from the current condition. Supervision and attendance of Hyperbaric Oxygen Therapy provided. Continue HBO treatment as outlined in the treatment plan. Hyperbaric Oxygen: Rosemary Larger tolerated Treatment Number: 16 well today without complications.      Electronically signed by Kaleb Hernandez MD on 3/24/2021 at 12:58 PM

## 2021-03-25 ENCOUNTER — OFFICE VISIT (OUTPATIENT)
Dept: INTERNAL MEDICINE CLINIC | Age: 51
End: 2021-03-25
Payer: COMMERCIAL

## 2021-03-25 ENCOUNTER — HOSPITAL ENCOUNTER (OUTPATIENT)
Dept: HYPERBARIC MEDICINE | Age: 51
Discharge: HOME | End: 2021-03-25
Payer: COMMERCIAL

## 2021-03-25 VITALS
RESPIRATION RATE: 18 BRPM | DIASTOLIC BLOOD PRESSURE: 90 MMHG | HEART RATE: 95 BPM | SYSTOLIC BLOOD PRESSURE: 130 MMHG | OXYGEN SATURATION: 98 % | WEIGHT: 206.4 LBS | TEMPERATURE: 97.4 F | BODY MASS INDEX: 39 KG/M2

## 2021-03-25 VITALS
HEART RATE: 82 BPM | RESPIRATION RATE: 16 BRPM | DIASTOLIC BLOOD PRESSURE: 67 MMHG | TEMPERATURE: 97.9 F | SYSTOLIC BLOOD PRESSURE: 145 MMHG

## 2021-03-25 DIAGNOSIS — F33.1 MODERATE EPISODE OF RECURRENT MAJOR DEPRESSIVE DISORDER (HCC): Primary | ICD-10-CM

## 2021-03-25 DIAGNOSIS — C09.9 TONSIL CANCER (HCC): ICD-10-CM

## 2021-03-25 DIAGNOSIS — Z12.11 SCREENING FOR COLON CANCER: ICD-10-CM
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
## 2021-03-25 DIAGNOSIS — M27.2 OSTEORADIONECROSIS OF JAW: Primary | ICD-10-CM

## 2021-03-25 PROCEDURE — G0277 HBOT, FULL BODY CHAMBER, 30M: HCPCS

## 2021-03-25 PROCEDURE — 99214 OFFICE O/P EST MOD 30 MIN: CPT | Performed by: FAMILY MEDICINE

## 2021-03-25 RX ORDER — DULOXETIN HYDROCHLORIDE 30 MG/1
30 CAPSULE, DELAYED RELEASE ORAL DAILY
Qty: 90 CAPSULE | Refills: 1 | Status: SHIPPED | OUTPATIENT
Start: 2021-03-25 | End: 2021-09-20 | Stop reason: SDUPTHER

## 2021-03-25 RX ORDER — BUPROPION HYDROCHLORIDE 150 MG/1
150 TABLET ORAL EVERY MORNING
Qty: 90 TABLET | Refills: 1 | Status: SHIPPED | OUTPATIENT
Start: 2021-03-25 | End: 2021-07-29 | Stop reason: ALTCHOICE

## 2021-03-25 ASSESSMENT — ENCOUNTER SYMPTOMS
CHEST TIGHTNESS: 0
SORE THROAT: 0
DIARRHEA: 0
COLOR CHANGE: 0
ABDOMINAL PAIN: 0
CONSTIPATION: 0
VOMITING: 0
SHORTNESS OF BREATH: 0

## 2021-03-25 NOTE — PROGRESS NOTES
[address] [facility]  Hyperbaric Oxygen Therapy   Progress Note      NAME: Henok Mckeon RECORD NUMBER:  [id number]  AGE: 48 y.o. GENDER: female  : 1970  EPISODE DATE:  3/25/2021     Subjective     HBO Treatment Number: 17 out of Total Treatments: 30    HBO Diagnosis:     Indications: Osteoradionecrosis of the Jaw(left jaw)        Safety checks performed prior to treatment. See doc flowsheets for documentation. Objective           No results for input(s): POCGLU in the last 72 hours. Pre treatment Vital Signs       Temp: 98.1 °F (36.7 °C)     Pulse: 89     Resp: 16     BP: 128/81     Blood Sugar NA    Post treatment Vital Signs  Temp: 97.9 °F (36.6 °C)  Pulse: 82  Resp: 16  BP: (!) 145/67(SN will inform )  Blood Sugar NA    Assessment        Physical Exam:  General Appearance:  alert and oriented to person, place and time, well-developed and well-nourished, in no acute distress, alert and oriented to person, place and time, well-developed and well nourished, in no acute distress and alert    Pre Tympanic Membrane Assessment:  tympanic membranes intact bilaterally, normal color    Post Tympanic Membrane Assessment:  Right: Normal(per Pt.)  Left: Normal(per Pt.)    Pulmonary/Chest:  clear to auscultation bilaterally- no wheezes, rales or rhonchi, normal air movement, no respiratory distress and no chest wall tenderness    Cardiovascular:  normal, regular rate and rhythm    Chamber #: [id number]       Treatment Start Time: 0813     Pressure Reached Time: 0825  MARIA GUADALUPE : 2.5  Number of Air Breaks:  Treatment Status: Air break X 2 for 5 minutes each      Decompression Time: 1005   Treatment End Time: 1017    Symptoms Noted During Treatment: None    TOTAL TIME AT DEPTH-100    TOTAL TIME IN CHAMBER-124    Adverse Event: no      I was present on these premises and immediately available to furnish assistance & direction throughout the procedure.        Addie Camacho Kelly Rojas is a 48 y.o. female  did successfully complete today's hyperbaric oxygen treatment at [address] [place] [address]. In my clinical judgement, ongoing HBO therapy is  necessary at this time, given a threat to patient function, limb or life from the current condition. Supervision and attendance of Hyperbaric Oxygen Therapy provided. Continue HBO treatment as outlined in the treatment plan. Hyperbaric Oxygen: Judith Shaikh tolerated Treatment Number: 08 well today without complications.      Electronically signed by Andres Montero MD on 3/25/2021 at 11:50 AM

## 2021-03-25 NOTE — PROGRESS NOTES
Subjective:      Chief Complaint   Patient presents with    6 Month Follow-Up     Pt presents for 6 month f/u.  Other     Pt has just lost her mother, making loss of 25 friends and family. HPI:  Mago Olvera is a 48 y.o. female who presents today for follow up of chronic conditions as listed below. Tonsil cancer: Had PET scan in Dec, will be getting laryngoscopy next month. Was also started in hyperbaric therapy for possible necrosis in her L jaw (from effects of radiation). Has follow up in a few weeks with oncology at [facility]. Has lost several friends/family members in the past year, her mother passed away earlier this week. Is currently taking cymbalta which was previously working well but now having worsening depressive symptoms. Has been on wellbutrin in the past, which worked well. Had side effects with lexapro and buspar.         Past Medical History:   Diagnosis Date    Acne vulgaris     Anxiety and depression     Family history of breast cancer     mother and 5 females in her family with CA breast;    GERD (gastroesophageal reflux disease)     HBO-Osteoradionecrosis of jaw 2021    Left lumbar radiculopathy 2016    Obesity, Class III, BMI 40-49.9 (morbid obesity) ([facility] [address]     Osteoarthritis Knee     bilateral osteoarthritis of knees and patellofemoral-followed by Dr Rhoda Mansfield Paroxysmal tachycardia unspecified     with heart rate 145; (?AVNRT)    Prediabetes     Smoker         Past Surgical History:   Procedure Laterality Date     SECTION      ENDOMETRIAL ABLATION      KNEE ARTHROSCOPY Left     LAPAROSCOPIC APPENDECTOMY  2013    NICOLE AND BSO  2011       Social History     Tobacco Use    Smoking status: Former Smoker     Packs/day: 0.25     Years: 33.00     Pack years: 8.25     Types: Cigarettes     Start date: 1985    Smokeless tobacco: Never Used   Substance Use Topics    Alcohol use: No     Alcohol/week: 0.0 standard drinks Head: Normocephalic and atraumatic. Right Ear: External ear normal.      Left Ear: External ear normal.      Nose: Nose normal.      Mouth/Throat:      Pharynx: Oropharynx is clear. Eyes:      General: No scleral icterus. Right eye: No discharge. Left eye: No discharge. Extraocular Movements: Extraocular movements intact. Conjunctiva/sclera: Conjunctivae normal.   Neck:      Musculoskeletal: Normal range of motion and neck supple. No neck rigidity. Cardiovascular:      Rate and Rhythm: Normal rate and regular rhythm. Heart sounds: Normal heart sounds. Pulmonary:      Effort: Pulmonary effort is normal.      Breath sounds: Normal breath sounds. No wheezing or rales. Musculoskeletal:         General: No deformity. Skin:     General: Skin is warm and dry. Findings: No rash. Neurological:      General: No focal deficit present. Mental Status: She is alert. Mental status is at baseline. Motor: No weakness. Psychiatric:         Mood and Affect: Mood normal.         Behavior: Behavior normal.            Assessment / Plan:      1. Moderate episode of recurrent major depressive disorder ([facility] [address]  Worsening symptoms due to recent losses of several friends/family. Will add wellbutrin as she did not tolerate increased doses of cymbalta in the past.  Follow up in 1 month or earlier as needed. - buPROPion (WELLBUTRIN XL) 150 MG extended release tablet; Take 1 tablet by mouth every morning  Dispense: 90 tablet; Refill: 1  - DULoxetine (CYMBALTA) 30 MG extended release capsule; Take 1 capsule by mouth daily  Dispense: 90 capsule; Refill: 1    2. [facility]    3. Tonsil cancer ([facility] [address]  Currently undergoing hyperbaric treatment for possible jaw necrosis, continue following with oncology. Patient voiced understanding and agreement with plan.   All questions/concerns were addressed, risks/side effects of medications were reviewed. Return precautions and after visit summary were provided. Return in about 4 weeks (around 4/22/2021). or earlier as needed.       Sonia Sr MD

## 2021-03-29 ENCOUNTER — HOSPITAL ENCOUNTER (OUTPATIENT)
Dept: HYPERBARIC MEDICINE | Age: 51
Discharge: HOME | End: 2021-03-29
Payer: COMMERCIAL

## 2021-03-29 VITALS
TEMPERATURE: 97.5 F | SYSTOLIC BLOOD PRESSURE: 138 MMHG | RESPIRATION RATE: 18 BRPM | HEART RATE: 86 BPM | DIASTOLIC BLOOD PRESSURE: 79 MMHG

## 2021-03-29 DIAGNOSIS — M27.2 OSTEORADIONECROSIS OF JAW: Primary | ICD-10-CM
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
## 2021-03-29 DIAGNOSIS — C09.9 TONSIL CANCER (HCC): ICD-10-CM

## 2021-03-29 PROCEDURE — 99183 HYPERBARIC OXYGEN THERAPY: CPT | Performed by: NURSE PRACTITIONER

## 2021-03-29 PROCEDURE — G0277 HBOT, FULL BODY CHAMBER, 30M: HCPCS

## 2021-03-29 NOTE — PROGRESS NOTES
[facility]  Hyperbaric Oxygen Therapy   Progress Note      NAME: Sumi Clark RECORD NUMBER:  [id number]  AGE: 48 y.o. GENDER: female  : 1970  EPISODE DATE:  3/29/2021     Subjective     HBO Treatment Number: 18 out of Total Treatments: 30    HBO Diagnosis:     Indications: Osteoradionecrosis of the Jaw        Safety checks performed prior to treatment. See doc flowsheets for documentation. Objective           No results for input(s): POCGLU in the last 72 hours. Pre treatment Vital Signs       Temp: 98.1 °F (36.7 °C)     Pulse: 97     Resp: 18     BP: 122/82     Blood Sugar N/A    Post treatment Vital Signs  Temp: 97.5 °F (36.4 °C)  Pulse: 86  Resp: 18  BP: 138/79  Blood Sugar N/A    Assessment        Physical Exam:  General Appearance:  alert and oriented to person, place and time, well-developed and well-nourished, in no acute distress    Pre Tympanic Membrane Assessment:  tympanic membranes intact bilaterally    Post Tympanic Membrane Assessment:  Right: Normal(PER PT)  Left: Normal(PER PT)    Pulmonary/Chest:  clear to auscultation bilaterally- no wheezes, rales or rhonchi, normal air movement, no respiratory distress    Cardiovascular:  normal, regular rate and rhythm    Chamber #: [id number]       Treatment Start Time: 0803     Pressure Reached Time: 0815  MARIA GUADALUPE : 2.5  Number of Air Breaks:  Treatment Status: Air break X 2 for 5 minutes each      Decompression Time: 8619   Treatment End Time: 1008    Symptoms Noted During Treatment: None    Adverse Event: no      Total time in chamber: 125  Minutes at depth: 100    I was present on these premises and immediately available to furnish assistance & direction throughout the procedure. Jacqueline Negron is a 48 y.o. female  did successfully complete today's hyperbaric oxygen treatment at [address] [place] [address].     In my clinical judgement, ongoing HBO therapy is  necessary at this time, given a threat to patient function, limb or life from the current condition. Supervision and attendance of Hyperbaric Oxygen Therapy provided. Continue HBO treatment as outlined in the treatment plan. Hyperbaric Oxygen: Denae Barton tolerated Treatment Number: 61 well today without complications.      Electronically signed by JAIME Eddy CNP on 3/29/2021 at 5:27 PM

## 2021-03-30 ENCOUNTER — HOSPITAL ENCOUNTER (OUTPATIENT)
Dept: HYPERBARIC MEDICINE | Age: 51
Discharge: HOME | End: 2021-03-30
Payer: COMMERCIAL

## 2021-03-30 VITALS
HEART RATE: 85 BPM | TEMPERATURE: 98.9 F | DIASTOLIC BLOOD PRESSURE: 84 MMHG | SYSTOLIC BLOOD PRESSURE: 122 MMHG | RESPIRATION RATE: 16 BRPM

## 2021-03-30 DIAGNOSIS — M27.2 OSTEORADIONECROSIS OF JAW: Primary | ICD-10-CM
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
## 2021-03-30 PROCEDURE — G0277 HBOT, FULL BODY CHAMBER, 30M: HCPCS

## 2021-03-30 PROCEDURE — 99183 HYPERBARIC OXYGEN THERAPY: CPT | Performed by: NURSE PRACTITIONER

## 2021-03-30 NOTE — PLAN OF CARE
Problem: Physical Regulation:  Goal: Tolerate HBO therapy and barotrauma will be prevented  Description: Tolerate HBO therapy and barotrauma will be prevented  Outcome: Ongoing

## 2021-03-30 NOTE — PROGRESS NOTES
[address] [facility],[address]  Hyperbaric Oxygen Therapy   Progress Note      NAME: Ulices Jewell RECORD NUMBER:  [id number]  AGE: 48 y.o. GENDER: female  : 1970  EPISODE DATE:  3/30/2021     Subjective     HBO Treatment Number: 19 out of Total Treatments: 30    HBO Diagnosis:     Indications: Osteoradionecrosis of the Jaw        Safety checks performed prior to treatment. See doc flowsheets for documentation. Objective           No results for input(s): POCGLU in the last 72 hours. Pre treatment Vital Signs       Temp: 98.9 °F (37.2 °C)     Pulse: 85     Resp: 16     BP: 122/84     Blood Sugar N/A    Post treatment Vital Signs  Temp: 98.9 °F (37.2 °C)  Pulse: 85  Resp: 16  BP: 122/84  Blood Sugar N/A    Assessment        Physical Exam:  General Appearance:  alert and oriented to person, place and time, well-developed and well-nourished, in no acute distress    Pre Tympanic Membrane Assessment:  tympanic membranes intact bilaterally    Post Tympanic Membrane Assessment:  Right: Normal(PER PT)  Left: Normal(PER PT)    Pulmonary/Chest:  clear to auscultation bilaterally- no wheezes, rales or rhonchi, normal air movement, no respiratory distress    Cardiovascular:  normal, regular rate and rhythm    Chamber #: [id number]       Treatment Start Time: 0814     Pressure Reached Time: 0826  MARIA GUADALUPE : 2.5  Number of Air Breaks:  Treatment Status: Air break X 2 for 5 minutes each      Decompression Time: 1006   Treatment End Time: 1017    Symptoms Noted During Treatment: None    Adverse Event: no      Total time in chamber: 123  Minutes at depth: 100    I was present on these premises and immediately available to furnish assistance & direction throughout the procedure. Imelda Woodall is a 48 y.o. female  did successfully complete today's hyperbaric oxygen treatment at [address] [place] [address].     In my clinical judgement, ongoing HBO therapy is  necessary

## 2021-03-31 ENCOUNTER — HOSPITAL ENCOUNTER (OUTPATIENT)
Dept: HYPERBARIC MEDICINE | Age: 51
Discharge: HOME | End: 2021-03-31
Payer: COMMERCIAL

## 2021-03-31 VITALS
RESPIRATION RATE: 16 BRPM | HEART RATE: 85 BPM | TEMPERATURE: 97.2 F | DIASTOLIC BLOOD PRESSURE: 85 MMHG | SYSTOLIC BLOOD PRESSURE: 128 MMHG
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
## 2021-03-31 DIAGNOSIS — C09.9 TONSIL CANCER (HCC): ICD-10-CM

## 2021-03-31 DIAGNOSIS — M27.2 OSTEORADIONECROSIS OF JAW: Primary | ICD-10-CM

## 2021-03-31 PROCEDURE — G0277 HBOT, FULL BODY CHAMBER, 30M: HCPCS

## 2021-03-31 PROCEDURE — 99183 HYPERBARIC OXYGEN THERAPY: CPT | Performed by: NURSE PRACTITIONER

## 2021-04-01 ENCOUNTER — HOSPITAL ENCOUNTER (OUTPATIENT)
Dept: HYPERBARIC MEDICINE | Age: 51
Discharge: HOME | End: 2021-04-01
Payer: COMMERCIAL

## 2021-04-01 VITALS
SYSTOLIC BLOOD PRESSURE: 129 MMHG | TEMPERATURE: 97.9 F | HEART RATE: 74 BPM | RESPIRATION RATE: 16 BRPM | DIASTOLIC BLOOD PRESSURE: 86 MMHG
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
## 2021-04-01 DIAGNOSIS — M27.2 OSTEORADIONECROSIS OF JAW: Primary | ICD-10-CM

## 2021-04-01 DIAGNOSIS — C09.9 TONSIL CANCER (HCC): ICD-10-CM

## 2021-04-01 PROCEDURE — G0277 HBOT, FULL BODY CHAMBER, 30M: HCPCS

## 2021-04-01 ASSESSMENT — PAIN DESCRIPTION - DESCRIPTORS: DESCRIPTORS: SORE

## 2021-04-01 ASSESSMENT — PAIN - FUNCTIONAL ASSESSMENT: PAIN_FUNCTIONAL_ASSESSMENT: ACTIVITIES ARE NOT PREVENTED

## 2021-04-01 ASSESSMENT — PAIN DESCRIPTION - PROGRESSION: CLINICAL_PROGRESSION: GRADUALLY WORSENING

## 2021-04-01 ASSESSMENT — PAIN DESCRIPTION - LOCATION: LOCATION: NECK

## 2021-04-01 NOTE — PROGRESS NOTES
oxygen treatment at [address] [place] [address] Camila Murray. In my clinical judgement, ongoing HBO therapy is  necessary at this time, given a threat to patient function, limb or life from the current condition. Supervision and attendance of Hyperbaric Oxygen Therapy provided. Continue HBO treatment as outlined in the treatment plan. Hyperbaric Oxygen: Stephy Listen tolerated Treatment Number: 21 well today without complications.      Electronically signed by Alia Esquivel MD on 4/1/2021 at 11:39 AM

## 2021-04-02 ENCOUNTER — HOSPITAL ENCOUNTER (OUTPATIENT)
Dept: HYPERBARIC MEDICINE | Age: 51
Discharge: HOME | End: 2021-04-02
Payer: COMMERCIAL

## 2021-04-02 VITALS
DIASTOLIC BLOOD PRESSURE: 79 MMHG | SYSTOLIC BLOOD PRESSURE: 166 MMHG | TEMPERATURE: 97.5 F | RESPIRATION RATE: 16 BRPM | HEART RATE: 85 BPM
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
## 2021-04-02 DIAGNOSIS — C09.9 TONSIL CANCER (HCC): ICD-10-CM

## 2021-04-02 DIAGNOSIS — M27.2 OSTEORADIONECROSIS OF JAW: Primary | ICD-10-CM

## 2021-04-02 PROCEDURE — G0277 HBOT, FULL BODY CHAMBER, 30M: HCPCS

## 2021-04-02 NOTE — PROGRESS NOTES
Patient instructed to report signs and/or symptoms of chest pain, eye or ear pain, SOB, or any other issues or concerns. Patient verbalized understanding. Patient tolerated treatment well, no complaint of pain or discomfort pre or post treatment.
patient function, limb or life from the current condition. Supervision and attendance of Hyperbaric Oxygen Therapy provided. Continue HBO treatment as outlined in the treatment plan. Hyperbaric Oxygen: Alem Faith tolerated Treatment Number: 22 well today without complications.      Electronically signed by JAIME Hooks CNP on 4/2/2021 at 12:09 PM

## 2021-04-02 NOTE — PLAN OF CARE
Problem: Physical Regulation:  Goal: Tolerate HBO therapy and barotrauma will be prevented  Description: Tolerate HBO therapy and barotrauma will be prevented  Outcome: Ongoing     Problem: Physical Regulation:  Intervention: Assess for signs and symptoms of adverse events including confinement anxiety, pneumothorax, oxygen toxicity, and barotrauma  Note: Patient tolerated treatment well. Intervention: Assess knowledge and expectations of HBO therapy  Note: Patient tolerated treatment well. Intervention: Assess for history of confinement anxiety  Note: Patient tolerated treatment well. Intervention: Notify health care provider regarding significant changes in patient's condition  Note: Patient tolerated treatment well. Intervention: Provide comfort related to the HBO environment and equalizaton of middle ear  Note: Patient tolerated treatment well. Intervention: Monitor for symptoms of seizure  Note: Patient tolerated treatment well.

## 2021-04-05 ENCOUNTER — HOSPITAL ENCOUNTER (OUTPATIENT)
Dept: HYPERBARIC MEDICINE | Age: 51
Discharge: HOME | End: 2021-04-05
Payer: COMMERCIAL

## 2021-04-05 VITALS
RESPIRATION RATE: 16 BRPM | HEART RATE: 85 BPM | DIASTOLIC BLOOD PRESSURE: 99 MMHG | TEMPERATURE: 97.9 F | SYSTOLIC BLOOD PRESSURE: 160 MMHG

## 2021-04-05 DIAGNOSIS — C09.9 TONSIL CANCER (HCC): ICD-10-CM

## 2021-04-05 DIAGNOSIS — M27.2 OSTEORADIONECROSIS OF JAW: Primary | ICD-10-CM
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
## 2021-04-05 PROCEDURE — G0277 HBOT, FULL BODY CHAMBER, 30M: HCPCS

## 2021-04-05 PROCEDURE — 99183 HYPERBARIC OXYGEN THERAPY: CPT | Performed by: NURSE PRACTITIONER

## 2021-04-05 NOTE — PROGRESS NOTES
[address] [facility],4Th Floor  Hyperbaric Oxygen Therapy   Progress Note      NAME: Elisabeth Swanson RECORD NUMBER:  [id number]  AGE: 48 y.o. GENDER: female  : 1970  EPISODE DATE:  2021     Subjective     HBO Treatment Number: 23 out of Total Treatments: 30    HBO Diagnosis:     Indications: Osteoradionecrosis of the Jaw(left)        Safety checks performed prior to treatment. See doc flowsheets for documentation. Objective           No results for input(s): POCGLU in the last 72 hours. Pre treatment Vital Signs       Temp: 97 °F (36.1 °C)     Pulse: 97     Resp: 16     BP: 123/83     Blood Sugar N/A    Post treatment Vital Signs  Temp: 97.9 °F (36.6 °C)  Pulse: 85  Resp: 16  BP: (!) 160/99(Pt. denies s/s HTN. SN will inform ERICA Narayanan)  Blood Sugar N/A    Assessment        Physical Exam:  General Appearance:  alert and oriented to person, place and time, well-developed and well-nourished, in no acute distress    Pre Tympanic Membrane Assessment:  tympanic membranes intact bilaterally    Post Tympanic Membrane Assessment:  Right: Normal(per Pt.)  Left: Normal(per Pt.)    Pulmonary/Chest:  clear to auscultation bilaterally- no wheezes, rales or rhonchi, normal air movement, no respiratory distress    Cardiovascular:  normal, regular rate and rhythm    Chamber #: [id number]       Treatment Start Time: 0819     Pressure Reached Time: 0832  MARIA GUADALUPE : 2.5  Number of Air Breaks:  Treatment Status: Air break X 2 for 5 minutes each      Decompression Time: 1012   Treatment End Time: 1025    Symptoms Noted During Treatment: None    Adverse Event: no      Total time in chamber: 126  Minutes at depth: 100    I was present on these premises and immediately available to furnish assistance & direction throughout the procedure. Jen Napoles is a 48 y.o. female  did successfully complete today's hyperbaric oxygen treatment at [address] [place] [address] [employer].     In my clinical judgement, ongoing HBO therapy is  necessary at this time, given a threat to patient function, limb or life from the current condition. Supervision and attendance of Hyperbaric Oxygen Therapy provided. Continue HBO treatment as outlined in the treatment plan. Hyperbaric Oxygen: Mable Kincaid tolerated Treatment Number: 23 well today without complications.      Electronically signed by JAIME De Los Santos CNP on 4/5/2021 at 5:11 PM

## 2021-04-07 ENCOUNTER — HOSPITAL ENCOUNTER (OUTPATIENT)
Dept: HYPERBARIC MEDICINE | Age: 51
Discharge: HOME | End: 2021-04-07
Payer: COMMERCIAL

## 2021-04-07 VITALS
RESPIRATION RATE: 16 BRPM | DIASTOLIC BLOOD PRESSURE: 90 MMHG | SYSTOLIC BLOOD PRESSURE: 146 MMHG | TEMPERATURE: 97.2 F | HEART RATE: 85 BPM

## 2021-04-07 DIAGNOSIS — M27.2 OSTEORADIONECROSIS OF JAW: Primary | ICD-10-CM

## 2021-04-07 DIAGNOSIS — C09.9 TONSIL CANCER (HCC): ICD-10-CM
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
## 2021-04-07 PROCEDURE — G0277 HBOT, FULL BODY CHAMBER, 30M: HCPCS

## 2021-04-07 ASSESSMENT — PAIN DESCRIPTION - ORIENTATION: ORIENTATION: LEFT

## 2021-04-07 ASSESSMENT — PAIN DESCRIPTION - PAIN TYPE: TYPE: ACUTE PAIN

## 2021-04-07 ASSESSMENT — PAIN DESCRIPTION - ONSET: ONSET: SUDDEN

## 2021-04-07 ASSESSMENT — PAIN DESCRIPTION - FREQUENCY: FREQUENCY: INTERMITTENT

## 2021-04-07 NOTE — PROGRESS NOTES
[facility]  Hyperbaric Oxygen Therapy   Progress Note      NAME: Giselle Mcclure RECORD NUMBER:  [id number]  AGE: 48 y.o. GENDER: female  : 1970  EPISODE DATE:  2021     Subjective     HBO Treatment Number: 24 out of Total Treatments: 30    HBO Diagnosis:     Indications: Osteoradionecrosis of the Jaw(left)        Safety checks performed prior to treatment. See doc flowsheets for documentation. Objective           No results for input(s): POCGLU in the last 72 hours. Pre treatment Vital Signs       Temp: 97.8 °F (36.6 °C)     Pulse: 96     Resp: 16     BP: 130/86     Blood Sugar N/A    Post treatment Vital Signs  Temp: 97.2 °F (36.2 °C)  Pulse: 85  Resp: 16  BP: (!) 146/90(SN will inform )  Blood Sugar N/A    Assessment        Physical Exam:  General Appearance:  alert and oriented to person, place and time, well-developed and well-nourished, in no acute distress    Pre Tympanic Membrane Assessment:  tympanic membranes intact bilaterally, normal color, normal light reflex bilaterally    Post Tympanic Membrane Assessment:  Right: Normal(Per Pt.)  Left: Normal(Per Pt.)    Pulmonary/Chest:  clear to auscultation bilaterally- no wheezes, rales or rhonchi, normal air movement, no respiratory distress    Cardiovascular:  regular rate and rhythm, no murmurs rubs or gallops    Chamber #: [id number]       Treatment Start Time: 0811     Pressure Reached Time: 0823  MARIA GUADALUPE : 2.5  Number of Air Breaks:  Treatment Status: No Air break      Decompression Time: 1003   Treatment End Time: 1015    Symptoms Noted During Treatment: None    TOTAL TIME AT DEPTH-100    TOTAL TIME IN CHAMBER-124    Adverse Event: no      I was present on these premises and immediately available to furnish assistance & direction throughout the procedure.        Carlyle King is a 48 y.o. female  did successfully complete today's hyperbaric oxygen treatment at [facility],  [address]. In my clinical judgement, ongoing HBO therapy is  necessary at this time, given a threat to patient function, limb or life from the current condition. Supervision and attendance of Hyperbaric Oxygen Therapy provided. Continue HBO treatment as outlined in the treatment plan. Hyperbaric Oxygen: Tessy Koehler tolerated Treatment Number: 24 well today without complications.      Electronically signed by Darryle Bellis, MD on 4/7/2021 at 12:01 PM

## 2021-04-08 ENCOUNTER — HOSPITAL ENCOUNTER (OUTPATIENT)
Dept: HYPERBARIC MEDICINE | Age: 51
Discharge: HOME | End: 2021-04-08
Payer: COMMERCIAL

## 2021-04-08 VITALS
SYSTOLIC BLOOD PRESSURE: 138 MMHG | RESPIRATION RATE: 16 BRPM | HEART RATE: 79 BPM | TEMPERATURE: 98.5 F | DIASTOLIC BLOOD PRESSURE: 87 MMHG
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
## 2021-04-08 DIAGNOSIS — M27.2 OSTEORADIONECROSIS OF JAW: Primary | ICD-10-CM

## 2021-04-08 DIAGNOSIS — C09.9 TONSIL CANCER (HCC): ICD-10-CM

## 2021-04-08 PROCEDURE — G0277 HBOT, FULL BODY CHAMBER, 30M: HCPCS

## 2021-04-08 PROCEDURE — 99183 HYPERBARIC OXYGEN THERAPY: CPT | Performed by: NURSE PRACTITIONER

## 2021-04-08 ASSESSMENT — PAIN - FUNCTIONAL ASSESSMENT: PAIN_FUNCTIONAL_ASSESSMENT: ACTIVITIES ARE NOT PREVENTED

## 2021-04-08 ASSESSMENT — PAIN DESCRIPTION - ONSET: ONSET: ON-GOING

## 2021-04-08 ASSESSMENT — PAIN DESCRIPTION - LOCATION: LOCATION: TOE (COMMENT WHICH ONE)

## 2021-04-08 ASSESSMENT — PAIN DESCRIPTION - PAIN TYPE: TYPE: ACUTE PAIN

## 2021-04-08 ASSESSMENT — PAIN DESCRIPTION - ORIENTATION: ORIENTATION: LEFT;OTHER (COMMENT)

## 2021-04-08 NOTE — PROGRESS NOTES
[facility]  Hyperbaric Oxygen Therapy   Progress Note      NAME: Elisabeth Swanson RECORD NUMBER:  [id number]  AGE: 48 y.o. GENDER: female  : 1970  EPISODE DATE:  2021     Subjective     HBO Treatment Number: 25 out of Total Treatments: 30    HBO Diagnosis:     Indications: Osteoradionecrosis of the Jaw(left)        Safety checks performed prior to treatment. See doc flowsheets for documentation. Objective           No results for input(s): POCGLU in the last 72 hours. Pre treatment Vital Signs       Temp: 98 °F (36.7 °C)     Pulse: 85     Resp: 16     BP: (!) 134/90(Pt. having increased pain ,right great toe tendonitis pain reported this morning)     Blood Sugar N/A    Post treatment Vital Signs  Temp: 98.5 °F (36.9 °C)  Pulse: 79  Resp: 16  BP: 138/87  Blood Sugar N/A    Assessment        Physical Exam:  General Appearance:  alert and oriented to person, place and time, well-developed and well-nourished, in no acute distress    Pre Tympanic Membrane Assessment:  tympanic membranes intact bilaterally    Post Tympanic Membrane Assessment:  Right: Normal(Per Pt.)  Left: Normal(Per Pt.)    Pulmonary/Chest:  clear to auscultation bilaterally- no wheezes, rales or rhonchi, normal air movement, no respiratory distress    Cardiovascular:  normal, regular rate and rhythm    Chamber #: [id number]       Treatment Start Time: 0809     Pressure Reached Time: 0823  MARIA GUADALUPE : 2.5  Number of Air Breaks:  Treatment Status: Air break X 2 for 5 minutes each      Decompression Time: 1003   Treatment End Time: 1016    Symptoms Noted During Treatment: None    Adverse Event: no      Total time in chamber: 127  Minutes at depth: 100    I was present on these premises and immediately available to furnish assistance & direction throughout the procedure.        Jen Napoles is a 48 y.o. female  did successfully complete today's hyperbaric oxygen treatment at [facility]) [address] [facility],  [address]. In my clinical judgement, ongoing HBO therapy is  necessary at this time, given a threat to patient function, limb or life from the current condition. Supervision and attendance of Hyperbaric Oxygen Therapy provided. Continue HBO treatment as outlined in the treatment plan. Hyperbaric Oxygen: Mervat Cordova tolerated Treatment Number: 25 well today without complications.      Electronically signed by JAIME Yuan CNP on 4/8/2021 at 1:34 PM

## 2021-04-09 ENCOUNTER — HOSPITAL ENCOUNTER (OUTPATIENT)
Dept: HYPERBARIC MEDICINE | Age: 51
Discharge: HOME | End: 2021-04-09
Payer: COMMERCIAL

## 2021-04-09 VITALS
SYSTOLIC BLOOD PRESSURE: 126 MMHG | TEMPERATURE: 98 F | DIASTOLIC BLOOD PRESSURE: 85 MMHG | RESPIRATION RATE: 16 BRPM | HEART RATE: 80 BPM

## 2021-04-09 DIAGNOSIS — M27.2 OSTEORADIONECROSIS OF JAW: Primary | ICD-10-CM
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
## 2021-04-09 DIAGNOSIS — C09.9 TONSIL CANCER (HCC): ICD-10-CM

## 2021-04-09 PROCEDURE — G0277 HBOT, FULL BODY CHAMBER, 30M: HCPCS

## 2021-04-09 ASSESSMENT — PAIN DESCRIPTION - LOCATION: LOCATION: TOE (COMMENT WHICH ONE)

## 2021-04-09 ASSESSMENT — PAIN SCALES - GENERAL: PAINLEVEL_OUTOF10: 7

## 2021-04-09 ASSESSMENT — PAIN DESCRIPTION - FREQUENCY: FREQUENCY: CONTINUOUS

## 2021-04-09 ASSESSMENT — PAIN DESCRIPTION - PROGRESSION: CLINICAL_PROGRESSION: GRADUALLY WORSENING

## 2021-04-09 NOTE — PROGRESS NOTES
111 [facility],4Th Floor  Hyperbaric Oxygen Therapy   Progress Note      NAME: Sosa Lemus RECORD NUMBER:  [id number]  AGE: 48 y.o. GENDER: female  : 1970  EPISODE DATE:  2021     Subjective     HBO Treatment Number: 26 out of Total Treatments: 30    HBO Diagnosis:     Indications: Osteoradionecrosis of the Jaw(left)        Safety checks performed prior to treatment. See doc flowsheets for documentation. Objective           No results for input(s): POCGLU in the last 72 hours. Pre treatment Vital Signs       Temp: 97.7 °F (36.5 °C)     Pulse: 92     Resp: 16     BP: 124/81     Blood Sugar n/a    Post treatment Vital Signs  Temp: 98 °F (36.7 °C)  Pulse: 80  Resp: 16  BP: 126/85  Blood Sugar n/a    Assessment        Physical Exam:  General Appearance:  alert and oriented to person, place and time, well-developed and well-nourished, in no acute distress    Pre Tympanic Membrane Assessment:  tympanic membranes intact bilaterally    Post Tympanic Membrane Assessment:  Right: Normal(per Pt.)  Left: Normal(per Pt.)    Pulmonary/Chest:  clear to auscultation bilaterally- no wheezes, rales or rhonchi, normal air movement, no respiratory distress    Cardiovascular:  normal    Chamber #: [id number]       Treatment Start Time: 0813     Pressure Reached Time: 0826  MARIA GUADALUPE : 2.5  Number of Air Breaks:  Treatment Status: Air break X 2 for 5 minutes each      Decompression Time: 1006   Treatment End Time: 1019    Symptoms Noted During Treatment: None    Adverse Event: no      I was present on these premises and immediately available to furnish assistance & direction throughout the procedure. Alo Herndon is a 48 y.o. female  did successfully complete today's hyperbaric oxygen treatment at [address] [place] [address].     In my clinical judgement, ongoing HBO therapy is  necessary at this time, given a threat to patient function, limb or life from the current condition. Supervision and attendance of Hyperbaric Oxygen Therapy provided. Continue HBO treatment as outlined in the treatment plan. Hyperbaric Oxygen: Tulio Zhao tolerated Treatment Number: 26 well today without complications.      Electronically signed by JAIME Myers CNP on 4/9/2021 at 1:04 PM

## 2021-04-12 ENCOUNTER — HOSPITAL ENCOUNTER (OUTPATIENT)
Dept: HYPERBARIC MEDICINE | Age: 51
Discharge: HOME | End: 2021-04-12
Payer: COMMERCIAL

## 2021-04-12 VITALS
DIASTOLIC BLOOD PRESSURE: 88 MMHG | HEART RATE: 75 BPM | RESPIRATION RATE: 16 BRPM | TEMPERATURE: 97.4 F | SYSTOLIC BLOOD PRESSURE: 133 MMHG

## 2021-04-12 DIAGNOSIS — M27.2 OSTEORADIONECROSIS OF JAW: Primary | ICD-10-CM
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
## 2021-04-12 DIAGNOSIS — C09.9 TONSIL CANCER (HCC): ICD-10-CM

## 2021-04-12 PROCEDURE — 99183 HYPERBARIC OXYGEN THERAPY: CPT | Performed by: NURSE PRACTITIONER

## 2021-04-12 PROCEDURE — G0277 HBOT, FULL BODY CHAMBER, 30M: HCPCS

## 2021-04-12 NOTE — PROGRESS NOTES
[facility]  Hyperbaric Oxygen Therapy   Progress Note      NAME: Bj Alexander RECORD NUMBER:  [id number]  AGE: 48 y.o. GENDER: female  : 1970  EPISODE DATE:  2021     Subjective     HBO Treatment Number: 27 out of Total Treatments: 30    HBO Diagnosis:     Indications: Osteoradionecrosis of the Jaw(left)        Safety checks performed prior to treatment. See doc flowsheets for documentation. Objective           No results for input(s): POCGLU in the last 72 hours. Pre treatment Vital Signs       Temp: 97.5 °F (36.4 °C)     Pulse: 98     Resp: 16     BP: 138/88     Blood Sugar N/A    Post treatment Vital Signs  Temp: 97.4 °F (36.3 °C)  Pulse: 75  Resp: 16  BP: 133/88  Blood Sugar N/A    Assessment        Physical Exam:  General Appearance:  alert and oriented to person, place and time, well-developed and well-nourished, in no acute distress    Pre Tympanic Membrane Assessment:  tympanic membranes intact bilaterally    Post Tympanic Membrane Assessment:  Right: Normal(per Pt.)  Left: Normal(per Pt.)    Pulmonary/Chest:  clear to auscultation bilaterally- no wheezes, rales or rhonchi, normal air movement, no respiratory distress    Cardiovascular:  normal, regular rate and rhythm    Chamber #: [id number]       Treatment Start Time: 0819     Pressure Reached Time: 0832  MARIA GUADALUPE : 2.5  Number of Air Breaks:  Treatment Status: Air break X 2 for 5 minutes each      Decompression Time: 1012   Treatment End Time: 1023    Symptoms Noted During Treatment: None    Adverse Event: no      Total time in chamber: 124  Minutes at depth: 100    I was present on these premises and immediately available to furnish assistance & direction throughout the procedure. Dana Salvador is a 48 y.o. female  did successfully complete today's hyperbaric oxygen treatment at [address] [place] [address].     In my clinical judgement, ongoing HBO therapy is necessary at this time, given a threat to patient function, limb or life from the current condition. Supervision and attendance of Hyperbaric Oxygen Therapy provided. Continue HBO treatment as outlined in the treatment plan. Hyperbaric Oxygen: Hiren Connor tolerated Treatment Number: 27 well today without complications.      Electronically signed by Aviva Sandhoff, APRN - CNP on 4/12/2021 at 2:29 PM

## 2021-04-13 ENCOUNTER — HOSPITAL ENCOUNTER (OUTPATIENT)
Dept: HYPERBARIC MEDICINE | Age: 51
Discharge: HOME | End: 2021-04-13
Payer: COMMERCIAL

## 2021-04-13 VITALS
HEART RATE: 74 BPM | SYSTOLIC BLOOD PRESSURE: 122 MMHG | DIASTOLIC BLOOD PRESSURE: 80 MMHG | TEMPERATURE: 97.4 F | RESPIRATION RATE: 16 BRPM

## 2021-04-13 DIAGNOSIS — C09.9 TONSIL CANCER (HCC): ICD-10-CM

## 2021-04-13 DIAGNOSIS — M27.2 OSTEORADIONECROSIS OF JAW: Primary | ICD-10-CM
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
## 2021-04-13 PROCEDURE — 99183 HYPERBARIC OXYGEN THERAPY: CPT | Performed by: NURSE PRACTITIONER

## 2021-04-13 PROCEDURE — G0277 HBOT, FULL BODY CHAMBER, 30M: HCPCS

## 2021-04-13 ASSESSMENT — PAIN - FUNCTIONAL ASSESSMENT: PAIN_FUNCTIONAL_ASSESSMENT: ACTIVITIES ARE NOT PREVENTED

## 2021-04-13 ASSESSMENT — PAIN DESCRIPTION - LOCATION: LOCATION: TOE (COMMENT WHICH ONE)

## 2021-04-13 NOTE — PROGRESS NOTES
[facility]  Hyperbaric Oxygen Therapy   Progress Note      NAME: Mora Lozano RECORD NUMBER:  [id number]  AGE: 48 y.o. GENDER: female  : 1970  EPISODE DATE:  2021     Subjective     HBO Treatment Number: 28 out of Total Treatments: 30    HBO Diagnosis:     Indications: Osteoradionecrosis of the Jaw(left)        Safety checks performed prior to treatment. See doc flowsheets for documentation. Objective           No results for input(s): POCGLU in the last 72 hours. Pre treatment Vital Signs       Temp: 97.6 °F (36.4 °C)     Pulse: 79     Resp: 16     BP: 116/78     Blood Sugar N/A    Post treatment Vital Signs  Temp: 97.4 °F (36.3 °C)  Pulse: 74  Resp: 16  BP: 122/80  Blood Sugar N/A    Assessment        Physical Exam:  General Appearance:  alert and oriented to person, place and time, well-developed and well-nourished, in no acute distress    Pre Tympanic Membrane Assessment:  tympanic membranes intact bilaterally    Post Tympanic Membrane Assessment:  Right: Normal(Per Pt.)  Left: Normal(per Pt.)    Pulmonary/Chest:  clear to auscultation bilaterally- no wheezes, rales or rhonchi, normal air movement, no respiratory distress    Cardiovascular:  normal, regular rate and rhythm    Chamber #: [id number]       Treatment Start Time: 0813     Pressure Reached Time: 0826  MARIA GUADALUPE : 2.5  Number of Air Breaks:  Treatment Status: Air break X 2 for 5 minutes each      Decompression Time: 1006   Treatment End Time: 1019    Symptoms Noted During Treatment: None    Adverse Event: no      Total time in chamber: 126  Minutes at depth: 100    I was present on these premises and immediately available to furnish assistance & direction throughout the procedure. Maryellen Zhang is a 48 y.o. female  did successfully complete today's hyperbaric oxygen treatment at [address] [place] [address].     In my clinical judgement, ongoing HBO therapy is necessary at this time, given a threat to patient function, limb or life from the current condition. Supervision and attendance of Hyperbaric Oxygen Therapy provided. Continue HBO treatment as outlined in the treatment plan. Hyperbaric Oxygen: Mauro Rosales tolerated Treatment Number: 28 well today without complications.      Electronically signed by JAIME Devi CNP on 4/13/2021 at 1:47 PM

## 2021-04-16 ENCOUNTER — HOSPITAL ENCOUNTER (OUTPATIENT)
Dept: HYPERBARIC MEDICINE | Age: 51
Discharge: HOME | End: 2021-04-16
Payer: COMMERCIAL

## 2021-04-16 VITALS
HEART RATE: 71 BPM | DIASTOLIC BLOOD PRESSURE: 80 MMHG | RESPIRATION RATE: 16 BRPM | TEMPERATURE: 97.7 F | SYSTOLIC BLOOD PRESSURE: 119 MMHG

## 2021-04-16 DIAGNOSIS — C09.9 TONSIL CANCER (HCC): ICD-10-CM
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
## 2021-04-16 DIAGNOSIS — M27.2 OSTEORADIONECROSIS OF JAW: Primary | ICD-10-CM

## 2021-04-16 PROCEDURE — G0277 HBOT, FULL BODY CHAMBER, 30M: HCPCS

## 2021-04-16 NOTE — PROGRESS NOTES
[facility]  Hyperbaric Oxygen Therapy   Progress Note      NAME: Kellie Fox RECORD NUMBER:  [id number]  AGE: 48 y.o. GENDER: female  : 1970  EPISODE DATE:  2021     Subjective     HBO Treatment Number: 29 out of Total Treatments: 30    HBO Diagnosis:     Indications: Osteoradionecrosis of the Jaw(left)        Safety checks performed prior to treatment. See doc flowsheets for documentation. Objective           No results for input(s): POCGLU in the last 72 hours. Pre treatment Vital Signs       Temp: 98.2 °F (36.8 °C)     Pulse: 96     Resp: 16     BP: 117/78     Blood Sugar N/A    Post treatment Vital Signs  Temp: 97.7 °F (36.5 °C)  Pulse: 71  Resp: 16  BP: 119/80  Blood Sugar N/A    Assessment        Physical Exam:  General Appearance:  alert and oriented to person, place and time, well-developed and well-nourished, in no acute distress    Pre Tympanic Membrane Assessment:  tympanic membranes intact bilaterally    Post Tympanic Membrane Assessment:  Right: Normal(per Pt.)  Left: Normal(per Pt.)    Pulmonary/Chest:  clear to auscultation bilaterally- no wheezes, rales or rhonchi, normal air movement, no respiratory distress    Cardiovascular:  normal    Chamber #: [id number]       Treatment Start Time: 0815     Pressure Reached Time: 0828  MARIA GUADALUPE : 2.5  Number of Air Breaks:  Treatment Status: Air break X 2 for 5 minutes each      Decompression Time: 1008   Treatment End Time: 1021    Symptoms Noted During Treatment: None    Adverse Event: no      I was present on these premises and immediately available to furnish assistance & direction throughout the procedure. Gilbert Lr is a 48 y.o. female  did successfully complete today's hyperbaric oxygen treatment at [address] [place] [address] [employer].     In my clinical judgement, ongoing HBO therapy is  necessary at this time, given a threat to patient function, limb or life from the current condition. Supervision and attendance of Hyperbaric Oxygen Therapy provided. Continue HBO treatment as outlined in the treatment plan. Hyperbaric Oxygen: Mauro Rosales tolerated Treatment Number: 29 well today without complications.      Electronically signed by JAIME Adams CNP on 4/16/2021 at 11:57 AM

## 2021-04-19 ENCOUNTER — HOSPITAL ENCOUNTER (OUTPATIENT)
Dept: HYPERBARIC MEDICINE | Age: 51
Discharge: HOME | End: 2021-04-19
Payer: COMMERCIAL

## 2021-04-19 VITALS
SYSTOLIC BLOOD PRESSURE: 119 MMHG | RESPIRATION RATE: 16 BRPM | DIASTOLIC BLOOD PRESSURE: 76 MMHG | TEMPERATURE: 98.1 F | HEART RATE: 72 BPM

## 2021-04-19 DIAGNOSIS — M27.2 OSTEORADIONECROSIS OF JAW: Primary | ICD-10-CM

## 2021-04-19 DIAGNOSIS — C09.9 TONSIL CANCER (HCC): ICD-10-CM
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
## 2021-04-19 PROCEDURE — G0277 HBOT, FULL BODY CHAMBER, 30M: HCPCS

## 2021-04-19 PROCEDURE — 99183 HYPERBARIC OXYGEN THERAPY: CPT | Performed by: NURSE PRACTITIONER

## 2021-04-19 ASSESSMENT — PAIN DESCRIPTION - ONSET: ONSET: ON-GOING

## 2021-04-19 ASSESSMENT — PAIN DESCRIPTION - LOCATION: LOCATION: TOE (COMMENT WHICH ONE)

## 2021-04-19 ASSESSMENT — PAIN SCALES - GENERAL: PAINLEVEL_OUTOF10: 5

## 2021-04-19 ASSESSMENT — PAIN DESCRIPTION - FREQUENCY: FREQUENCY: CONTINUOUS

## 2021-04-19 ASSESSMENT — PAIN DESCRIPTION - DESCRIPTORS: DESCRIPTORS: THROBBING

## 2021-04-19 ASSESSMENT — PAIN DESCRIPTION - PAIN TYPE: TYPE: ACUTE PAIN

## 2021-04-19 NOTE — PROGRESS NOTES
[facility]  Hyperbaric Oxygen Therapy   Progress Note      NAME: Amelia Goyal RECORD NUMBER:  [id number]  AGE: 48 y.o. GENDER: female  : 1970  EPISODE DATE:  2021     Subjective     HBO Treatment Number: 30 out of Total Treatments: 30    HBO Diagnosis:     Indications: Osteoradionecrosis of the Jaw(left)        Safety checks performed prior to treatment. See doc flowsheets for documentation. Objective           No results for input(s): POCGLU in the last 72 hours. Pre treatment Vital Signs       Temp: 97.2 °F (36.2 °C)     Pulse: 92     Resp: 16     BP: 121/83     Blood Sugar N/A    Post treatment Vital Signs  Temp: 98.1 °F (36.7 °C)  Pulse: 72  Resp: 16  BP: 119/76  Blood Sugar N/A    Assessment        Physical Exam:  General Appearance:  alert and oriented to person, place and time, well-developed and well-nourished, in no acute distress    Pre Tympanic Membrane Assessment:  tympanic membranes intact bilaterally    Post Tympanic Membrane Assessment:  Right: Normal(per Pt.)  Left: Normal(per Pt.)    Pulmonary/Chest:  clear to auscultation bilaterally- no wheezes, rales or rhonchi, normal air movement, no respiratory distress    Cardiovascular:  normal, regular rate and rhythm    Chamber #: [id number]       Treatment Start Time: 0818     Pressure Reached Time: 0832  MARIA GUADALUPE : 2.5  Number of Air Breaks:  Treatment Status: Air break X 2 for 5 minutes each      Decompression Time: 1012   Treatment End Time: 1023    Symptoms Noted During Treatment: None    Adverse Event: no      Total time in chamber: 125  Minutes at depth: 100    I was present on these premises and immediately available to furnish assistance & direction throughout the procedure. Sosa Branham is a 48 y.o. female  did successfully complete today's hyperbaric oxygen treatment at [address] [place] [address].     In my clinical judgement, ongoing HBO therapy is

## 2021-04-19 NOTE — PROGRESS NOTES
Discharge Instructions for  Hyperbaric Oxygen Therapy  [address] [place] [address]  [address], [address]  Telephone number [phone]      You have been treated with 100% oxygen in the Hyperbaric Chamber at the [address] [place] [address]. There are usually no adverse effects or problems which follow this treatment. However, for comfort and safety, we strongly recommend these guidelines are followed:     It is perfectly normal to feel tired after a hyperbaric treatment. This tiredness should go away 2 to 3 days after your last treatment.  Avoid heavy exertion, exercise or other unnecessary activity if you are feeling tired.  Some people develop a feeling of fullness or stuffiness in their ears. This is a result of a small amount of fluid build-up in the middle ear. You may take an over the counter decongestant if approved by your doctor. Follow the instructions in the medicine package for the amount to take. If this problem lasts for more than 48 hours, please call your personal doctor. You also may call or return to the [address] [place] [address] and have a Hyperbaric doctor examine you.  In rare cases, patients may have so called late effects after the treatments. Please call the [address] [place] [address] Road if you have any of these symptoms:    [x]Headache that is not relieved by over the counter pain medicine. [x]Changes in vision. During the course of many hyperbaric treatments (20 or more) some patients may complain of a temporary problem with sharp focus on distant objects. This is a result of changes in the shape of the lens. Your vision should return to normal in 6 to 8 weeks. [x]Nausea or vomiting. .  [x]Clumsiness, difficulty walking or numbness and tingling of your arms and legs. [x]If you are on prescription medicines from your personal doctor, you may continue to take these as directed. [facility] Information:     If you have questions or develop any of the symptoms mentioned, please contact the [facility] Sorensen at [address] [place] [address] Place 8:00 am - 5:00 pm. If you need help outside these hours and cannot wait until we are again available, contact your PCP or go to the hospital emergency room. Patient Signature:_______________________Date:_________Time:________    [] Patient unable to sign Discharge Instructions given to ECF/Transportation/POA    The information contained in the After Visit Summary has been reviewed with me, the patient and/or responsible adult, by my health care provider(s). I had the opportunity to ask questions regarding this information.   I have elected to receive  [x]  After Visit Summary        Nurse Signature:_______________________Date:_________Time:_________    Electronically signed by Alethea Ortiz LPN on 3/16/8643 at 45:63 AM

## 2021-04-22 ENCOUNTER — PREP FOR PROCEDURE (OUTPATIENT)
Dept: GASTROENTEROLOGY | Age: 51
End: 2021-04-22

## 2021-04-22 ENCOUNTER — OFFICE VISIT (OUTPATIENT)
Dept: INTERNAL MEDICINE CLINIC | Age: 51
End: 2021-04-22
Payer: COMMERCIAL

## 2021-04-22 ENCOUNTER — OFFICE VISIT (OUTPATIENT)
Dept: GASTROENTEROLOGY | Age: 51
End: 2021-04-22

## 2021-04-22 VITALS
WEIGHT: 213.6 LBS | TEMPERATURE: 97.3 F | BODY MASS INDEX: 41.72 KG/M2 | OXYGEN SATURATION: 98 % | RESPIRATION RATE: 16 BRPM | SYSTOLIC BLOOD PRESSURE: 114 MMHG | HEART RATE: 92 BPM | DIASTOLIC BLOOD PRESSURE: 80 MMHG

## 2021-04-22 VITALS
HEIGHT: 60 IN | RESPIRATION RATE: 16 BRPM | DIASTOLIC BLOOD PRESSURE: 84 MMHG | SYSTOLIC BLOOD PRESSURE: 130 MMHG | OXYGEN SATURATION: 98 % | HEART RATE: 85 BPM | BODY MASS INDEX: 41.74 KG/M2 | WEIGHT: 212.6 LBS

## 2021-04-22 DIAGNOSIS — E78.5 HYPERLIPIDEMIA, UNSPECIFIED HYPERLIPIDEMIA TYPE: ICD-10-CM

## 2021-04-22 DIAGNOSIS — C09.9 TONSIL CANCER (HCC): Primary | ICD-10-CM

## 2021-04-22 DIAGNOSIS — F41.9 ANXIETY AND DEPRESSION: ICD-10-CM

## 2021-04-22 DIAGNOSIS — Z01.818 PREOP TESTING: Primary | ICD-10-CM

## 2021-04-22 DIAGNOSIS — F32.A ANXIETY AND DEPRESSION: ICD-10-CM

## 2021-04-22 DIAGNOSIS — Z12.11 ENCOUNTER FOR SCREENING COLONOSCOPY: Primary | ICD-10-CM

## 2021-04-22 DIAGNOSIS — Z00.00 GENERAL MEDICAL EXAM: ICD-10-CM

## 2021-04-22 PROCEDURE — 99999 PR OFFICE/OUTPT VISIT,PROCEDURE ONLY: CPT | Performed by: NURSE PRACTITIONER

## 2021-04-22 PROCEDURE — 99214 OFFICE O/P EST MOD 30 MIN: CPT | Performed by: FAMILY MEDICINE

## 2021-04-22 RX ORDER — SIMETHICONE 80 MG
80 TABLET,CHEWABLE ORAL 3 TIMES DAILY
Qty: 3 TABLET | Refills: 3 | Status: SHIPPED | OUTPATIENT
Start: 2021-04-22 | End: 2021-07-29

## 2021-04-22 RX ORDER — SODIUM CHLORIDE 0.9 % (FLUSH) 0.9 %
5-40 SYRINGE (ML) INJECTION PRN
Status: CANCELLED | OUTPATIENT
Start: 2021-04-22

## 2021-04-22 RX ORDER — SODIUM CHLORIDE, SODIUM LACTATE, POTASSIUM CHLORIDE, CALCIUM CHLORIDE 600; 310; 30; 20 MG/100ML; MG/100ML; MG/100ML; MG/100ML
INJECTION, SOLUTION INTRAVENOUS CONTINUOUS
Status: CANCELLED | OUTPATIENT
Start: 2021-04-22

## 2021-04-22 RX ORDER — SODIUM CHLORIDE 9 MG/ML
25 INJECTION, SOLUTION INTRAVENOUS PRN
Status: CANCELLED | OUTPATIENT
Start: 2021-04-22

## 2021-04-22 RX ORDER — CYANOCOBALAMIN (VITAMIN B-12) 1000 MCG
1 TABLET, EXTENDED RELEASE ORAL 2 TIMES DAILY WITH MEALS
COMMUNITY
End: 2022-08-09

## 2021-04-22 RX ORDER — SODIUM CHLORIDE 0.9 % (FLUSH) 0.9 %
5-40 SYRINGE (ML) INJECTION EVERY 12 HOURS SCHEDULED
Status: CANCELLED | OUTPATIENT
Start: 2021-04-22

## 2021-04-22 RX ORDER — POLYETHYLENE GLYCOL 3350, SODIUM SULFATE, SODIUM CHLORIDE, POTASSIUM CHLORIDE, ASCORBIC ACID, SODIUM ASCORBATE 140-9-5.2G
1 KIT ORAL ONCE
Qty: 1 EACH | Refills: 0 | Status: SHIPPED | OUTPATIENT
Start: 2021-04-22 | End: 2021-04-22

## 2021-04-22 ASSESSMENT — ENCOUNTER SYMPTOMS
SORE THROAT: 0
VOMITING: 0
COLOR CHANGE: 0
BACK PAIN: 1
CONSTIPATION: 0
DIARRHEA: 0
DIARRHEA: 0
NAUSEA: 0
ABDOMINAL PAIN: 0
WHEEZING: 0
SHORTNESS OF BREATH: 0
CONSTIPATION: 0
CHEST TIGHTNESS: 0
COUGH: 0
SHORTNESS OF BREATH: 0
ABDOMINAL PAIN: 0
BLOOD IN STOOL: 0
PHOTOPHOBIA: 0
COLOR CHANGE: 0
VOMITING: 0
EYE PAIN: 0

## 2021-04-22 NOTE — PROGRESS NOTES
Subjective:      Chief Complaint   Patient presents with    1 Month Follow-Up     Pt presents for 1 month f/u. Pt started Wellbutrin. She has not noticed much of any difference. HPI:  Georgia Schmitt is a 48 y.o. female who presents today for follow up of chronic conditions as listed below. Wellbutrin was added at last appointment for worsening depressive symptoms. Has been taking it for about 3 weeks, states she has not noticed much of a difference yet. Has taken it in the past with improvement in symptoms. Completed hyperbaric treatment last week. Was told necrosis in her jaw has improved, and there were no signs of cancer on her last exam.  Will still be following with radiation and oncology every 3 months. May be starting PT for lymphedema in her neck. Is scheduled for colonoscopy. No acute concerns today.       The 10-year ASCVD risk score (Anneliese Sharp., et al., 2013) is: 0.8%    Values used to calculate the score:      Age: 48 years      Sex: Female      Is Non- : No      Diabetic: No      Tobacco smoker: No      Systolic Blood Pressure: 755 mmHg      Is BP treated: No      HDL Cholesterol: 68 MG/DL      Total Cholesterol: 211 MG/DL       Past Medical History:   Diagnosis Date    Acne vulgaris     Anxiety and depression 2018    Cancer ([address]     Family history of breast cancer     mother and 5 females in her family with CA breast;    GERD (gastroesophageal reflux disease)     HBO-Osteoradionecrosis of jaw 2021    Left lumbar radiculopathy 2016    Obesity, Class III, BMI 40-49.9 (morbid obesity) ([address]     Osteoarthritis Knee     bilateral osteoarthritis of knees and patellofemoral-followed by Dr Chasidy Gipson Paroxysmal tachycardia unspecified     with heart rate 145; (?AVNRT)    Prediabetes     Smoker         Past Surgical History:   Procedure Laterality Date     SECTION      ENDOMETRIAL ABLATION  2005    KNEE ARTHROSCOPY Left     LAPAROSCOPIC APPENDECTOMY  2/2013    NICOLE AND BSO  11/2011       Social History     Tobacco Use    Smoking status: Former Smoker     Packs/day: 0.25     Years: 33.00     Pack years: 8.25     Types: Cigarettes     Start date: 7/14/1985    Smokeless tobacco: Never Used   Substance Use Topics    Alcohol use: No     Alcohol/week: 0.0 standard drinks     Comment: rare        Review of Systems   Constitutional: Negative for activity change, appetite change, chills, fever and unexpected weight change. HENT: Negative for congestion and sore throat. Respiratory: Negative for chest tightness and shortness of breath. Cardiovascular: Negative for chest pain and palpitations. Gastrointestinal: Negative for abdominal pain, constipation, diarrhea and vomiting. Genitourinary: Negative for dysuria. Skin: Negative for color change. Neurological: Negative for dizziness and light-headedness. Psychiatric/Behavioral: Negative for dysphoric mood. The patient is not nervous/anxious. Prior to Visit Medications    Medication Sig Taking?  Authorizing Provider   calcium citrate-vitamin D (CITRICAL + D) 315-250 MG-UNIT TABS per tablet Take 1 tablet by mouth 2 times daily (with meals)  Historical Provider, MD   Multiple Vitamins-Minerals (HAIR/SKIN/NAILS/BIOTIN PO) Take by mouth  Historical Provider, MD   PEG-KCl-NaCl-NaSulf-Na Asc-C (PLENVU) 140 g SOLR Take 1 kit by mouth once for 1 dose  JAIME Ruiz CNP   simethicone (MYLICON) 80 MG chewable tablet Take 1 tablet by mouth 3 times daily  JAIME Ruiz CNP   buPROPion (WELLBUTRIN XL) 150 MG extended release tablet Take 1 tablet by mouth every morning  Monisha Boo MD   DULoxetine (CYMBALTA) 30 MG extended release capsule Take 1 capsule by mouth daily  Monisha Boo MD   SODIUM FLUORIDE, DENTAL GEL, (SF) 1.1 % GEL Place onto teeth nightly  Historical Provider, MD   Turmeric (QC TUMERIC COMPLEX PO) Take 1,000 Units by mouth 2 times daily  Historical Provider, MD   cyanocobalamin ([employer]S VITAMIN B12) 1000 MCG tablet Take 1 tablet by mouth every morning  Historical Provider, MD          Objective:      /80   Pulse 92   Temp 97.3 °F (36.3 °C)   Resp 16   Wt 213 lb 9.6 oz (96.9 kg)   SpO2 98%   BMI 41.72 kg/m²      Physical Exam  Vitals signs and nursing note reviewed. Constitutional:       General: She is not in acute distress. Appearance: Normal appearance. She is not ill-appearing or toxic-appearing. HENT:      Head: Normocephalic and atraumatic. Right Ear: External ear normal.      Left Ear: External ear normal.      Nose: Nose normal.      Mouth/Throat:      Pharynx: Oropharynx is clear. Eyes:      General: No scleral icterus. Right eye: No discharge. Left eye: No discharge. Extraocular Movements: Extraocular movements intact. Conjunctiva/sclera: Conjunctivae normal.   Neck:      Musculoskeletal: Normal range of motion and neck supple. No neck rigidity. Cardiovascular:      Rate and Rhythm: Normal rate and regular rhythm. Heart sounds: Normal heart sounds. Pulmonary:      Effort: Pulmonary effort is normal.      Breath sounds: Normal breath sounds. No wheezing or rales. Musculoskeletal:         General: No deformity. Skin:     General: Skin is warm and dry. Findings: No rash. Neurological:      General: No focal deficit present. Mental Status: She is alert. Mental status is at baseline. Motor: No weakness. Psychiatric:         Mood and Affect: Mood normal.         Behavior: Behavior normal.            Assessment / Plan:      1. Tonsil cancer ([facility] [facility]  Just completed hyperbaric oxygen therapy with improvement in necrosis, no signs of cancer on last exam.  Following with radiation oncology.    - TSH without Reflex; Future    2. General medical exam  Scheduled for colonoscopy. - CBC Auto Differential; Future  - Lipid Panel; Future    3. Hyperlipidemia, unspecified hyperlipidemia type  10 year ASCVD risk <1%, does not warrant statin therapy at this time. - CBC Auto Differential; Future  - Lipid Panel; Future    4. Anxiety and depression  No improvement with addition of wellbutrin yet but patient would like to continue current dose for now and follow up in 3 months. Will notify clinic for any worsening symptoms. Patient voiced understanding and agreement with plan. All questions/concerns were addressed, risks/side effects of medications were reviewed. Return precautions and after visit summary were provided. Return in about 3 months (around 7/22/2021). or earlier as needed.       Beryle Fuse, MD
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
## 2021-05-03 ENCOUNTER — TELEPHONE (OUTPATIENT)
Dept: GASTROENTEROLOGY | Age: 51
End: 2021-05-03

## 2021-05-03 NOTE — TELEPHONE ENCOUNTER
Called patient to schedule procedure. Unable to get a hold of patient. Left a voicemail requesting a call back at her earliest convenience.

## 2021-05-11 DIAGNOSIS — Z01.818 PREOP TESTING: Primary | ICD-10-CM

## 2021-05-17 ENCOUNTER — HOSPITAL ENCOUNTER (OUTPATIENT)
Age: 51
Setting detail: SPECIMEN
Discharge: HOME | End: 2021-05-17
Payer: COMMERCIAL

## 2021-05-17 ENCOUNTER — TELEPHONE (OUTPATIENT)
Dept: GASTROENTEROLOGY | Age: 51
End: 2021-05-17

## 2021-05-17 ENCOUNTER — NURSE ONLY (OUTPATIENT)
Dept: SURGERY | Age: 51
End: 2021-05-17
Payer: COMMERCIAL

## 2021-05-17 VITALS
SYSTOLIC BLOOD PRESSURE: 138 MMHG | HEART RATE: 75 BPM | DIASTOLIC BLOOD PRESSURE: 84 MMHG | TEMPERATURE: 97.9 F | OXYGEN SATURATION: 97 %

## 2021-05-17 DIAGNOSIS — Z01.818 PRE-OP TESTING: Primary | ICD-10-CM

## 2021-05-17 PROCEDURE — 99211 OFF/OP EST MAY X REQ PHY/QHP: CPT | Performed by: INTERNAL MEDICINE

## 2021-05-17 PROCEDURE — U0003 INFECTIOUS AGENT DETECTION BY NUCLEIC ACID (DNA OR RNA); SEVERE ACUTE RESPIRATORY SYNDROME CORONAVIRUS 2 (SARS-COV-2) (CORONAVIRUS DISEASE [COVID-19]), AMPLIFIED PROBE TECHNIQUE, MAKING USE OF HIGH THROUGHPUT TECHNOLOGIES AS DESCRIBED BY CMS-2020-01-R: HCPCS

## 2021-05-17 PROCEDURE — U0005 INFEC AGEN DETEC AMPLI PROBE: HCPCS

## 2021-05-17 NOTE — PATIENT INSTRUCTIONS
Pre-Procedure COVID-19 Self Testing  Quarantine Instructions  Day of Surgery Instructions         What to do before my surgery:    All patients scheduled for elective surgery must test for COVID19 72-96 hours prior to the surgery date.  Pre-Procedure COVID-19 Self-Test will be scheduled for you by your provider.  You can receive your Pre-Procedure COVID-19 Self-Test at:  [facility]. [address], [facility], [facility], [address]   If you do not have the COVID-19 test we will cancel or reschedule your procedure   Once you test you must quarantine at home until after your procedure with only your immediate family members or whoever lives with you.  If you must work during your quarantine period, we ask that you continue to practice social distancing, wear a mask that covers your mouth and nose and perform all hand hygiene as recommended by the CDC.  If you must go to the grocery, etc. and cannot get someone to do this for you please wear a mask that covers your mouth and nose and perform all hand hygiene as recommended by the CDC.  Your surgeon's office will notify you with any concerns about your test result. What can I expect on the day of surgery?  Arrive at the time the office or hospital staff tell you on the day of your procedure.  Wear a mask when entering the hospital.     A member of the hospital staff will take your temperature and ask you a few questions as you enter the building.  In abundance of caution for the safety of all our patients and staff, please follow all hospital visitor guidelines in place at the time of your procedure. The staff caring for you will stay in close communication with your loved one and keep them updated on progress.  Please provide a phone number for us to use when communicating with your family or ride home.    When you are ready to discharge, we will notify your family/person with you to bring the car to the front entrance. We will take you to them after you receive all of your discharge instructions.

## 2021-05-17 NOTE — PROGRESS NOTES
Patient collected pre-op COVID-19 screening instruction and collection supplies given to patient accordingly. Patient denies fever/cough/sob or recent travels. Patient voiced understanding of collection/quarantine instructions. COVID screening lab ordered, collection completed without difficulty, identifiers placed on specimen. AVS given at discharge. Results will be given via mychart or telephone call [facility] Dept will manage any positive test results, the procedure will be rescheduled at a later date).

## 2021-05-17 NOTE — TELEPHONE ENCOUNTER
Per my call to Nimesh Chong at [facility]; no PA required.  [facility]#[id number]

## 2021-05-17 NOTE — PROGRESS NOTES
Surgery 05/21/21 @ 94520 [phone] arrive @ 1300               1. Do not eat or drink anything after midnight - unless instructed by your doctor prior to surgery. This includes                   no water, chewing gum or mints. 2. Follow your directions as prescribed by the doctor for your procedure and medications. 3. Check with your Doctor regarding stopping Plavix, Coumadin, Lovenox,Effient,Pradaxa,Xarelto, Fragmin or other blood thinners and                   follow their instructions. 4. Do not smoke, and do not drink any alcoholic beverages 24 hours prior to surgery. This includes NA Beer. 5. You may brush your teeth and gargle the morning of surgery. DO NOT SWALLOW WATER   6. You MUST make arrangements for a responsible adult to take you home after your surgery and be able to check on you every couple                   hours for the day. You will not be allowed to leave alone or drive yourself home. It is strongly suggested someone stay with you the first 24                   hrs. Your surgery will be cancelled if you do not have a ride home. 7. Please wear simple, loose fitting clothing to the hospital.  Audery Ion not bring valuables (money, credit cards, checkbooks, etc.) Do not wear any                   makeup (including no eye makeup) or nail polish on your fingers or toes. 8. DO NOT wear any jewelry or piercings on day of surgery. All body piercing jewelry must be removed. 9. If you have dentures, they will be removed before going to the OR; we will provide you a container. If you wear contact lenses or glasses,                  they will be removed; please bring a case for them. 10. If you  have a Living Will and Durable Power of  for Healthcare, please bring in a copy. 11. Please bring picture ID,  insurance card, paperwork from the doctors office    (H & P, Consent, & card for implantable devices).            12. Take a shower the night before or morning of your procedure with the soaps provided, do not apply any lotion, oil or powder. 13. Wear a mask covering your nose & mouth when entering the hospital. Have your covid-19 test performed within 10 days of your                  Surgery. Covid test scheduled for 05/17/21. Quarantine yourself after the test until after your surgery.

## 2021-05-18 LAB
SARS-COV-2: NOT DETECTED
SOURCE: NORMAL

## 2021-05-20 ENCOUNTER — ANESTHESIA EVENT (OUTPATIENT)
Dept: OPERATING ROOM | Age: 51
End: 2021-05-20
Payer: COMMERCIAL

## 2021-05-20 ASSESSMENT — LIFESTYLE VARIABLES: SMOKING_STATUS: 1
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
## 2021-05-21 ENCOUNTER — HOSPITAL ENCOUNTER (OUTPATIENT)
Age: 51
Setting detail: OUTPATIENT SURGERY
Discharge: HOME | End: 2021-05-21
Attending: INTERNAL MEDICINE | Admitting: INTERNAL MEDICINE
Payer: COMMERCIAL

## 2021-05-21 ENCOUNTER — ANESTHESIA (OUTPATIENT)
Dept: OPERATING ROOM | Age: 51
End: 2021-05-21
Payer: COMMERCIAL

## 2021-05-21 VITALS
RESPIRATION RATE: 16 BRPM | HEART RATE: 79 BPM | DIASTOLIC BLOOD PRESSURE: 76 MMHG | WEIGHT: 208 LBS | SYSTOLIC BLOOD PRESSURE: 119 MMHG | BODY MASS INDEX: 39.27 KG/M2 | TEMPERATURE: 97.3 F | HEIGHT: 61 IN | OXYGEN SATURATION: 99 %

## 2021-05-21 VITALS — SYSTOLIC BLOOD PRESSURE: 111 MMHG | DIASTOLIC BLOOD PRESSURE: 59 MMHG | OXYGEN SATURATION: 92 %

## 2021-05-21 PROCEDURE — 7100000010 HC PHASE II RECOVERY - FIRST 15 MIN: Performed by: INTERNAL MEDICINE

## 2021-05-21 PROCEDURE — 88305 TISSUE EXAM BY PATHOLOGIST: CPT | Performed by: PATHOLOGY

## 2021-05-21 PROCEDURE — 2709999900 HC NON-CHARGEABLE SUPPLY: Performed by: INTERNAL MEDICINE

## 2021-05-21 PROCEDURE — 7100000011 HC PHASE II RECOVERY - ADDTL 15 MIN: Performed by: INTERNAL MEDICINE

## 2021-05-21 PROCEDURE — 3609010600 HC COLONOSCOPY POLYPECTOMY SNARE/COLD BIOPSY: Performed by: INTERNAL MEDICINE

## 2021-05-21 PROCEDURE — 3700000001 HC ADD 15 MINUTES (ANESTHESIA): Performed by: INTERNAL MEDICINE

## 2021-05-21 PROCEDURE — 2580000003 HC RX 258: Performed by: NURSE PRACTITIONER

## 2021-05-21 PROCEDURE — 3700000000 HC ANESTHESIA ATTENDED CARE: Performed by: INTERNAL MEDICINE

## 2021-05-21 PROCEDURE — 6360000002 HC RX W HCPCS: Performed by: NURSE ANESTHETIST, CERTIFIED REGISTERED

## 2021-05-21 RX ORDER — PROPOFOL 10 MG/ML
INJECTION, EMULSION INTRAVENOUS PRN
Status: DISCONTINUED | OUTPATIENT
Start: 2021-05-21 | End: 2021-05-21 | Stop reason: SDUPTHER

## 2021-05-21 RX ORDER — LIDOCAINE HYDROCHLORIDE 20 MG/ML
INJECTION, SOLUTION INTRAVENOUS PRN
Status: DISCONTINUED | OUTPATIENT
Start: 2021-05-21 | End: 2021-05-21 | Stop reason: SDUPTHER

## 2021-05-21 RX ORDER — SODIUM CHLORIDE 0.9 % (FLUSH) 0.9 %
5-40 SYRINGE (ML) INJECTION PRN
Status: DISCONTINUED | OUTPATIENT
Start: 2021-05-21 | End: 2021-05-21 | Stop reason: HOSPADM

## 2021-05-21 RX ORDER — SODIUM CHLORIDE, SODIUM LACTATE, POTASSIUM CHLORIDE, CALCIUM CHLORIDE 600; 310; 30; 20 MG/100ML; MG/100ML; MG/100ML; MG/100ML
INJECTION, SOLUTION INTRAVENOUS CONTINUOUS
Status: DISCONTINUED | OUTPATIENT
Start: 2021-05-21 | End: 2021-05-21 | Stop reason: HOSPADM

## 2021-05-21 RX ORDER — SODIUM CHLORIDE 9 MG/ML
25 INJECTION, SOLUTION INTRAVENOUS PRN
Status: DISCONTINUED | OUTPATIENT
Start: 2021-05-21 | End: 2021-05-21 | Stop reason: HOSPADM

## 2021-05-21 RX ORDER — SODIUM CHLORIDE 0.9 % (FLUSH) 0.9 %
5-40 SYRINGE (ML) INJECTION EVERY 12 HOURS SCHEDULED
Status: DISCONTINUED | OUTPATIENT
Start: 2021-05-21 | End: 2021-05-21 | Stop reason: HOSPADM

## 2021-05-21 RX ADMIN — PROPOFOL 50 MG: 10 INJECTION, EMULSION INTRAVENOUS at 14:08

## 2021-05-21 RX ADMIN — LIDOCAINE HYDROCHLORIDE 100 MG: 20 INJECTION, SOLUTION INTRAVENOUS at 14:05

## 2021-05-21 RX ADMIN — SODIUM CHLORIDE, POTASSIUM CHLORIDE, SODIUM LACTATE AND CALCIUM CHLORIDE: 600; 310; 30; 20 INJECTION, SOLUTION INTRAVENOUS at 13:43

## 2021-05-21 RX ADMIN — PROPOFOL 30 MG: 10 INJECTION, EMULSION INTRAVENOUS at 14:13

## 2021-05-21 RX ADMIN — PROPOFOL 100 MG: 10 INJECTION, EMULSION INTRAVENOUS at 14:05

## 2021-05-21 RX ADMIN — PROPOFOL 50 MG: 10 INJECTION, EMULSION INTRAVENOUS at 14:10

## 2021-05-21 ASSESSMENT — PAIN SCALES - GENERAL: PAINLEVEL_OUTOF10: 0

## 2021-05-21 ASSESSMENT — PAIN - FUNCTIONAL ASSESSMENT: PAIN_FUNCTIONAL_ASSESSMENT: 0-10

## 2021-05-21 NOTE — PROGRESS NOTES
1426 Patient transferred from GI lab to [facility] via cart, she is awake and denies needs at this time. 1430 Dr Foley Pleas in to talk to patient. 432 Patient is sitting up drinking a jaqui mist. [address] patient's father on phone and gave him home instructions. 1500 Patient up to bathroom to void. 1515 Patient discharged to home, her father will drive her.

## 2021-05-21 NOTE — ANESTHESIA POSTPROCEDURE EVALUATION
Department of Anesthesiology  Postprocedure Note    Patient: Mumtaz Gar  MRN: [id number]  YOB: 1970  Date of evaluation: 5/21/2021  Time:  2:21 PM     Procedure Summary     Date: 05/21/21 Room / Location: [address]    Anesthesia Start: 1356 Anesthesia Stop: 4310    Procedure: COLONOSCOPY POLYPECTOMY SNARE/COLD BIOPSY (N/A ) Diagnosis: (SCREENING)    Surgeons: Sabina Desir MD Responsible Provider: JAIME Zheng CRNA    Anesthesia Type: MAC ASA Status: 3          Anesthesia Type: MAC    Ladarius Phase I: Ladarius Score: 10    Ladarius Phase II:      Last vitals: Reviewed and per EMR flowsheets.        Anesthesia Post Evaluation    Patient location during evaluation: bedside  Patient participation: complete - patient participated  Level of consciousness: sleepy but conscious  Pain score: 0  Airway patency: patent  Nausea & Vomiting: no nausea and no vomiting  Complications: no  Cardiovascular status: blood pressure returned to baseline  Respiratory status: acceptable  Hydration status: euvolemic

## 2021-05-21 NOTE — H&P
HISTORY & PHYSICAL:  Patient's history with special attention to the cardiovascular, pulmonary systems and the current problem was reviewed with the patient immediately prior to the procedure. Medications, allergies, and pertinent laboratory tests were also reviewed at this time. The physical examination,as below, was then performed. Patient assessed to be ASA Class 2. Mallampati Airway Assessment: 2. History of adverse reactions involving sedation/anesthesia. None  Family history of adverse reaction to sedation/anesthesia. None      PHYSICAL EXAMINATION    Ht 5' 1\" (1.549 m)   Wt 215 lb (97.5 kg)   BMI 40.62 kg/m²     Mouth and Pharynx : Normal without focal findings  Cardiac: Regular rate and rhythm  Pulmonary: Clear bilaterally  Neurological: Normal without focal findings   Abdomen: Soft, non-tender, non-distended     Written informed consent obtained from patient. Risks (including but not limited to perforation, bloating and bleeding,) benefits and alternatives explained and questions answered. Patient verbalized understanding. Based on history and airway assessment patient is an appropriate candidate for  sedation.     Sabina Desir MD  05/21/21

## 2021-06-07 ENCOUNTER — TELEPHONE (OUTPATIENT)
Dept: GASTROENTEROLOGY | Age: 51
End: 2021-06-07

## 2021-06-07 ENCOUNTER — OFFICE VISIT (OUTPATIENT)
Dept: GASTROENTEROLOGY | Age: 51
End: 2021-06-07
Payer: COMMERCIAL

## 2021-06-07 VITALS
HEART RATE: 81 BPM | TEMPERATURE: 97.3 F | SYSTOLIC BLOOD PRESSURE: 114 MMHG | OXYGEN SATURATION: 98 % | WEIGHT: 215.6 LBS | DIASTOLIC BLOOD PRESSURE: 78 MMHG | BODY MASS INDEX: 36.81 KG/M2 | HEIGHT: 64 IN

## 2021-06-07 DIAGNOSIS — D36.9 ADENOMATOUS POLYPS: Primary | ICD-10-CM

## 2021-06-07 DIAGNOSIS — D12.6 ADENOMATOUS POLYP OF COLON, UNSPECIFIED PART OF COLON: ICD-10-CM

## 2021-06-07 DIAGNOSIS — K64.8 INTERNAL HEMORRHOIDS: ICD-10-CM

## 2021-06-07 PROCEDURE — 99212 OFFICE O/P EST SF 10 MIN: CPT | Performed by: NURSE PRACTITIONER

## 2021-06-07 NOTE — PATIENT INSTRUCTIONS
Patient Education        Colon Polyps: Care Instructions  Your Care Instructions     Colon polyps are growths in the colon or the rectum. The cause of most colon polyps is not known, and most people who get them do not have any problems. But a certain kind can turn into cancer. For this reason, regular testing for colon polyps is important for people as they get older. It is also important for anyone who has an increased risk for colon cancer. Polyps are usually found through routine colon cancer screening tests. Although most colon polyps are not cancerous, they are usually removed and then tested for cancer. Screening for colon cancer saves lives because the cancer can usually be cured if it is caught early. If you have a polyp that is the type that can turn into cancer, you may need more tests to examine your entire colon. The doctor will remove any other polyps that he or she finds, and you will be tested more often. Follow-up care is a key part of your treatment and safety. Be sure to make and go to all appointments, and call your doctor if you are having problems. It's also a good idea to know your test results and keep a list of the medicines you take. How can you care for yourself at home? Regular exams to look for colon polyps are the best way to prevent polyps from turning into colon cancer. These can include stool tests, sigmoidoscopy, colonoscopy, and CT colonography. Talk with your doctor about a testing schedule that is right for you. To prevent polyps  There is no home treatment that can prevent colon polyps. But these steps may help lower your risk for cancer. · Stay active. Being active can help you get to and stay at a healthy weight. Try to exercise on most days of the week. Walking is a good choice. · Eat well. Choose a variety of vegetables, fruits, legumes (such as peas and beans), fish, poultry, and whole grains. · Do not smoke.  If you need help quitting, talk to your doctor about stop-smoking programs and medicines. These can increase your chances of quitting for good. · If you drink alcohol, limit how much you drink. Limit alcohol to 2 drinks a day for men and 1 drink a day for women. When should you call for help? Call your doctor now or seek immediate medical care if:    · You have severe belly pain.     · Your stools are maroon or very bloody. Watch closely for changes in your health, and be sure to contact your doctor if:    · You have a fever.     · You have nausea or vomiting.     · You have a change in bowel habits (new constipation or diarrhea).     · Your symptoms get worse or are not improving as expected. Where can you learn more? Go to https://LP33.TVeb.Telly. org and sign in to your [employer] account. Enter [id number] in the [employer] box to learn more about \"Colon Polyps: Care Instructions. \"     If you do not have an account, please click on the \"Sign Up Now\" link. Current as of: December 17, 2020               Content Version: 12.8  © 9600-0510 Healthwise, Incorporated. Care instructions adapted under license by [facility] ([facility]). If you have questions about a medical condition or this instruction, always ask your healthcare professional. [place] [address] any warranty or liability for your use of this information.

## 2021-06-07 NOTE — PROGRESS NOTES
and oriented to person, place, and time. Psychiatric:         Mood and Affect: Mood normal.         Hospital Outpatient Visit on 05/17/2021   Component Date Value Ref Range Status    Source 05/17/2021 VIRAL SWAB   Final    SARS-CoV-2 05/17/2021 NOT DETECTED  NOT DETECTED Final    Comment: (NOTE)  This nucleic acid amplification test was developed and its  performance characteristics determined by [employer]. Nucleic acid amplification tests include RT-PCR and TMA. This test  has not been FDA cleared or approved. This test has been authorized  by FDA under an Emergency Use Authorization (EUA). This test is only  authorized for the duration of time the declaration that  circumstances exist justifying the authorization of the emergency use  of in vitro diagnostic tests for detection of SARS-CoV-2 virus and/or  diagnosis of COVID-19 infection under section 564(b)(1) of the Act,  21 U. S.C. 182UQD-0(Y) (1), unless the authorization is terminated or  revoked sooner. When diagnostic testing is negative, the possibility of a false  negative result should be considered in the context of a patient's  recent exposures and the presence of clinical signs and symptoms  consistent with COVID-19. An individual without symptoms of COVID-  19 and who is not shedding SARS-CoV-2                            virus would expect to have a  negative (not detected) result in this assay. Performed At: [address] [id number]  Mauro Mehta MD TK:[id number]         Assessment and Plan:  1. Adenomatous colon polyp that was removed. Recommend repeat colonoscopy in five years. Will place on recall roster. 2.  Internal hemorrhoids without bleeding. The patient was instructed to avoid straining with bowel movements and avoid sitting on the toilet for long periods of time. 3.  The patient was encouraged to follow-up as needed.

## 2021-07-19 ENCOUNTER — HOSPITAL ENCOUNTER (OUTPATIENT)
Age: 51
Discharge: HOME | End: 2021-07-19
Payer: COMMERCIAL

## 2021-07-19 LAB
BASOPHILS ABSOLUTE: 0 K/CU MM
BASOPHILS RELATIVE PERCENT: 0.6 % (ref 0–1)
CHOLESTEROL: 217 MG/DL
DIFFERENTIAL TYPE: ABNORMAL
EOSINOPHILS ABSOLUTE: 0.1 K/CU MM
EOSINOPHILS RELATIVE PERCENT: 2.9 % (ref 0–3)
HCT VFR BLD CALC: 44.5 % (ref 37–47)
HDLC SERPL-MCNC: 74 MG/DL
HEMOGLOBIN: 14.2 GM/DL (ref 12.5–16)
IMMATURE NEUTROPHIL %: 0.8 % (ref 0–0.43)
LDL CHOLESTEROL DIRECT: 133 MG/DL
LYMPHOCYTES ABSOLUTE: 0.8 K/CU MM
LYMPHOCYTES RELATIVE PERCENT: 17.6 % (ref 24–44)
MCH RBC QN AUTO: 30.5 PG (ref 27–31)
MCHC RBC AUTO-ENTMCNC: 31.9 % (ref 32–36)
MCV RBC AUTO: 95.7 FL (ref 78–100)
MONOCYTES ABSOLUTE: 0.4 K/CU MM
MONOCYTES RELATIVE PERCENT: 8.4 % (ref 0–4)
NUCLEATED RBC %: 0 %
PDW BLD-RTO: 13.1 % (ref 11.7–14.9)
PLATELET # BLD: 207 K/CU MM (ref 140–440)
PMV BLD AUTO: 10.6 FL (ref 7.5–11.1)
RBC # BLD: 4.65 M/CU MM (ref 4.2–5.4)
SEGMENTED NEUTROPHILS ABSOLUTE COUNT: 3.3 K/CU MM
SEGMENTED NEUTROPHILS RELATIVE PERCENT: 69.7 % (ref 36–66)
TOTAL IMMATURE NEUTOROPHIL: 0.04 K/CU MM
TOTAL NUCLEATED RBC: 0 K/CU MM
TRIGL SERPL-MCNC: 78 MG/DL
TSH HIGH SENSITIVITY: 1.21 UIU/ML (ref 0.27–4.2)
WBC # BLD: 4.8 K/CU MM (ref 4–10.5)

## 2021-07-19 PROCEDURE — 36415 COLL VENOUS BLD VENIPUNCTURE: CPT

## 2021-07-19 PROCEDURE — 84443 ASSAY THYROID STIM HORMONE: CPT

## 2021-07-19 PROCEDURE — 83721 ASSAY OF BLOOD LIPOPROTEIN: CPT

## 2021-07-19 PROCEDURE — 85025 COMPLETE CBC W/AUTO DIFF WBC: CPT

## 2021-07-19 PROCEDURE — 80061 LIPID PANEL: CPT

## 2021-07-29 ENCOUNTER — OFFICE VISIT (OUTPATIENT)
Dept: INTERNAL MEDICINE CLINIC | Age: 51
End: 2021-07-29
Payer: COMMERCIAL

## 2021-07-29 VITALS
OXYGEN SATURATION: 98 % | DIASTOLIC BLOOD PRESSURE: 88 MMHG | SYSTOLIC BLOOD PRESSURE: 132 MMHG | WEIGHT: 221 LBS | BODY MASS INDEX: 37.73 KG/M2 | HEART RATE: 107 BPM | HEIGHT: 64 IN

## 2021-07-29 DIAGNOSIS — E78.5 HYPERLIPIDEMIA, UNSPECIFIED HYPERLIPIDEMIA TYPE: Primary | ICD-10-CM

## 2021-07-29 DIAGNOSIS — M54.16 LUMBAR BACK PAIN WITH RADICULOPATHY AFFECTING RIGHT LOWER EXTREMITY: ICD-10-CM

## 2021-07-29 DIAGNOSIS — E66.9 OBESITY (BMI 35.0-39.9 WITHOUT COMORBIDITY): ICD-10-CM

## 2021-07-29 DIAGNOSIS — Z00.00 GENERAL MEDICAL EXAM: ICD-10-CM

## 2021-07-29 DIAGNOSIS — F32.A ANXIETY AND DEPRESSION: ICD-10-CM

## 2021-07-29 DIAGNOSIS — F41.9 ANXIETY AND DEPRESSION: ICD-10-CM

## 2021-07-29 DIAGNOSIS — C09.9 TONSIL CANCER (HCC): ICD-10-CM

## 2021-07-29 PROCEDURE — 99396 PREV VISIT EST AGE 40-64: CPT | Performed by: FAMILY MEDICINE

## 2021-07-29 RX ORDER — GABAPENTIN 100 MG/1
CAPSULE ORAL
Qty: 90 CAPSULE | Refills: 1 | Status: SHIPPED
Start: 2021-07-29 | End: 2021-12-14 | Stop reason: DRUGHIGH

## 2021-07-29 SDOH — ECONOMIC STABILITY: FOOD INSECURITY: WITHIN THE PAST 12 MONTHS, THE FOOD YOU BOUGHT JUST DIDN'T LAST AND YOU DIDN'T HAVE MONEY TO GET MORE.: NEVER TRUE

## 2021-07-29 SDOH — ECONOMIC STABILITY: FOOD INSECURITY: WITHIN THE PAST 12 MONTHS, YOU WORRIED THAT YOUR FOOD WOULD RUN OUT BEFORE YOU GOT MONEY TO BUY MORE.: NEVER TRUE

## 2021-07-29 ASSESSMENT — ENCOUNTER SYMPTOMS
BACK PAIN: 1
VOMITING: 0
CHEST TIGHTNESS: 0
SORE THROAT: 0
COLOR CHANGE: 0
ABDOMINAL PAIN: 0
SHORTNESS OF BREATH: 0
CONSTIPATION: 0
DIARRHEA: 0

## 2021-07-29 ASSESSMENT — SOCIAL DETERMINANTS OF HEALTH (SDOH): HOW HARD IS IT FOR YOU TO PAY FOR THE VERY BASICS LIKE FOOD, HOUSING, MEDICAL CARE, AND HEATING?: NOT HARD AT ALL

## 2021-07-29 NOTE — PROGRESS NOTES
Subjective:      Chief Complaint   Patient presents with    Other     wellness check        HPI:  Torsten Johnson is a 48 y.o. female who presents today for follow up of chronic conditions as listed below. Started having R lower back pain a few weeks ago, no injury or trauma. States she already is getting injections for L lower back (Dr. Maharaj President). Pain radiates into R leg, has been having numbness and tingling. Had colonoscopy in May- repeat in 5 years (tubular adenoma and hyperplastic polyp). Mood:  States she discontinued wellbutrin a few months ago as she did not notice any improvement in mood. States cymbalta seems to be keeping symptoms adequately controlled. Needing paperwork completed for work.         The 10-year ASCVD risk score (Kassie Stephens., et al., 2013) is: 1%    Values used to calculate the score:      Age: 48 years      Sex: Female      Is Non- : No      Diabetic: No      Tobacco smoker: No      Systolic Blood Pressure: 030 mmHg      Is BP treated: No      HDL Cholesterol: 74 MG/DL      Total Cholesterol: 217 MG/DL       Past Medical History:   Diagnosis Date    Acne vulgaris     Anxiety and depression 2018    Cancer ([address] 2020    tonsil cancer    Family history of breast cancer     mother and 5 females in her family with CA breast;    GERD (gastroesophageal reflux disease)     HBO-Osteoradionecrosis of jaw 2021    Left lumbar radiculopathy 2016    Obesity, Class III, BMI 40-49.9 (morbid obesity) ([address]     Osteoarthritis Knee     bilateral osteoarthritis of knees and patellofemoral-followed by Dr Nielsen Catching Paroxysmal tachycardia unspecified     with heart rate 145; (?AVNRT)    Prediabetes     Smoker         Past Surgical History:   Procedure Laterality Date     SECTION      COLONOSCOPY  2021    three colon polyps    COLONOSCOPY N/A 2021    COLONOSCOPY POLYPECTOMY SNARE/COLD BIOPSY performed by Estuardo Ayoub Gabbi Chappell MD at [place] [address]      KNEE ARTHROSCOPY Left     LAPAROSCOPIC APPENDECTOMY  2013    NICOLE AND BSO  2011       Social History     Tobacco Use    Smoking status: Former Smoker     Packs/day: 0.25     Years: 33.00     Pack years: 8.25     Types: Cigarettes     Start date: 1985     Quit date: 2020     Years since quittin.1    Smokeless tobacco: Never Used   Substance Use Topics    Alcohol use: No     Alcohol/week: 0.0 standard drinks     Comment: rare        Review of Systems   Constitutional: Negative for activity change, appetite change, chills, fever and unexpected weight change. HENT: Negative for congestion and sore throat. Respiratory: Negative for chest tightness and shortness of breath. Cardiovascular: Negative for chest pain and palpitations. Gastrointestinal: Negative for abdominal pain, constipation, diarrhea and vomiting. Genitourinary: Negative for dysuria. Musculoskeletal: Positive for back pain. Skin: Negative for color change. Neurological: Negative for dizziness and light-headedness. Psychiatric/Behavioral: Negative for dysphoric mood. The patient is not nervous/anxious. Prior to Visit Medications    Medication Sig Taking?  Authorizing Provider   calcium citrate-vitamin D (CITRICAL + D) 315-250 MG-UNIT TABS per tablet Take 1 tablet by mouth 2 times daily (with meals) Yes Historical Provider, MD   Multiple Vitamins-Minerals (HAIR/SKIN/NAILS/BIOTIN PO) Take by mouth Yes Historical Provider, MD   DULoxetine (CYMBALTA) 30 MG extended release capsule Take 1 capsule by mouth daily Yes Andria Hanna MD   SODIUM FLUORIDE, DENTAL GEL, (SF) 1.1 % GEL Place onto teeth nightly Yes Historical Provider, MD   Turmeric (QC TUMERIC COMPLEX PO) Take 1,000 Units by mouth 2 times daily Yes Historical Provider, MD   cyanocobalamin ([employer] VITAMIN B12) 1000 MCG tablet Take 1 tablet by mouth every morning Yes Historical Provider, MD buPROPion (WELLBUTRIN XL) 150 MG extended release tablet Take 1 tablet by mouth every morning  Patient not taking: Reported on 7/29/2021  Luann Katz MD          Objective:      /88 (Site: Right Upper Arm, Position: Sitting, Cuff Size: Medium Adult)   Pulse 107   Ht 5' 4\" (1.626 m)   Wt 221 lb (100.2 kg)   SpO2 98%   BMI 37.93 kg/m²      Physical Exam  Vitals and nursing note reviewed. Constitutional:       General: She is not in acute distress. Appearance: Normal appearance. She is not ill-appearing or toxic-appearing. HENT:      Head: Normocephalic and atraumatic. Right Ear: External ear normal.      Left Ear: External ear normal.      Nose: Nose normal.      Mouth/Throat:      Pharynx: Oropharynx is clear. Eyes:      General: No scleral icterus. Right eye: No discharge. Left eye: No discharge. Extraocular Movements: Extraocular movements intact. Conjunctiva/sclera: Conjunctivae normal.   Cardiovascular:      Rate and Rhythm: Normal rate and regular rhythm. Heart sounds: Normal heart sounds. Pulmonary:      Effort: Pulmonary effort is normal.      Breath sounds: Normal breath sounds. No wheezing or rales. Musculoskeletal:         General: No deformity. Cervical back: Normal range of motion and neck supple. No rigidity. Skin:     General: Skin is warm and dry. Findings: No rash. Neurological:      General: No focal deficit present. Mental Status: She is alert. Mental status is at baseline. Motor: No weakness. Psychiatric:         Mood and Affect: Mood normal.         Behavior: Behavior normal.            Assessment / Plan:      1. General medical exam  Up to date on screening. Paperwork completed. 2. Hyperlipidemia, unspecified hyperlipidemia type  ASCVD risk <10%, does not warrant statin therapy.   Encouraged lifestyle modifications, will continue to monitor.   - CBC Auto Differential; Future  - Comprehensive Metabolic Panel; Future  - Hemoglobin A1C; Future  - Lipid Panel; Future    3. Anxiety and depression  Discontinued wellbutrin, patient states adequately controlled with cymbalta- will continue. 4. Tonsil cancer ([address]  In remission, continue following with oncology.   - TSH without Reflex; Future    5. Obesity (BMI 35.0-39.9 without comorbidity)  Encouraged lifestyle modifications. Discussed weight loss clinic, patient states she will contact clinic if she wants referral.     6. Lumbar back pain with radiculopathy affecting right lower extremity  Atraumatic. Already following with ortho spine for MIGUEL (for R sided symptoms), patient states she will contact office for follow up. In the meantime, will try gabapentin for symptom control.    - gabapentin (NEURONTIN) 100 MG capsule; Take up to 300mg twice a day  Dispense: 90 capsule; Refill: 1          I have spent 30 minutes on this patient encounter. Patient voiced understanding and agreement with plan. All questions/concerns were addressed, risks/side effects of medications were reviewed. Return precautions and after visit summary were provided. Return in about 6 months (around 1/29/2022). or earlier as needed.       Ellis Wan MD

## 2021-09-20 DIAGNOSIS — F33.1 MODERATE EPISODE OF RECURRENT MAJOR DEPRESSIVE DISORDER (HCC): ICD-10-CM

## 2021-09-21 RX ORDER — DULOXETIN HYDROCHLORIDE 30 MG/1
30 CAPSULE, DELAYED RELEASE ORAL DAILY
Qty: 90 CAPSULE | Refills: 1 | Status: SHIPPED
Start: 2021-09-21 | End: 2021-12-14 | Stop reason: DRUGHIGH

## 2021-12-14 ENCOUNTER — OFFICE VISIT (OUTPATIENT)
Dept: INTERNAL MEDICINE CLINIC | Age: 51
End: 2021-12-14
Payer: COMMERCIAL

## 2021-12-14 VITALS
DIASTOLIC BLOOD PRESSURE: 78 MMHG | BODY MASS INDEX: 40.46 KG/M2 | HEIGHT: 64 IN | OXYGEN SATURATION: 98 % | TEMPERATURE: 97.8 F | WEIGHT: 237 LBS | SYSTOLIC BLOOD PRESSURE: 132 MMHG | HEART RATE: 76 BPM

## 2021-12-14 DIAGNOSIS — M54.16 LEFT LUMBAR RADICULOPATHY: ICD-10-CM

## 2021-12-14 DIAGNOSIS — M54.2 NECK PAIN: ICD-10-CM

## 2021-12-14 DIAGNOSIS — F41.9 ANXIETY AND DEPRESSION: Primary | ICD-10-CM

## 2021-12-14 DIAGNOSIS — E66.9 OBESITY (BMI 35.0-39.9 WITHOUT COMORBIDITY): ICD-10-CM

## 2021-12-14 DIAGNOSIS — F32.A ANXIETY AND DEPRESSION: Primary | ICD-10-CM

## 2021-12-14 PROCEDURE — 99214 OFFICE O/P EST MOD 30 MIN: CPT | Performed by: FAMILY MEDICINE

## 2021-12-14 RX ORDER — GABAPENTIN 300 MG/1
300 CAPSULE ORAL 2 TIMES DAILY
Qty: 180 CAPSULE | Refills: 1 | Status: SHIPPED | OUTPATIENT
Start: 2021-12-14 | End: 2022-08-16

## 2021-12-14 RX ORDER — DULOXETIN HYDROCHLORIDE 60 MG/1
60 CAPSULE, DELAYED RELEASE ORAL DAILY
Qty: 90 CAPSULE | Refills: 1 | Status: SHIPPED
Start: 2021-12-14 | End: 2022-08-09 | Stop reason: SINTOL

## 2021-12-14 ASSESSMENT — ENCOUNTER SYMPTOMS
BACK PAIN: 1
SORE THROAT: 0
DIARRHEA: 0
ABDOMINAL PAIN: 0
SHORTNESS OF BREATH: 0
COLOR CHANGE: 0
VOMITING: 0
CHEST TIGHTNESS: 0
CONSTIPATION: 0

## 2021-12-14 NOTE — PROGRESS NOTES
Subjective:      Chief Complaint   Patient presents with    Anxiety    Depression    Headache    Numbness     fingers        HPI:  Anabel Hansen is a 46 y.o. female who presents today for follow up of chronic conditions as listed below. Knee pain:  Patient states she tried injections for R knee (both steroid and synvisc), which did not help so they recommended knee replacement. Has not scheduled yet but is hoping to get it completed in the new year. Has been followed by pain management for chronic low back pain. States she has been having numbness in her fingers when she was driving long distances (and headache at base of skull), so pain management ordered MRI of c and t spine. States it showed bulging discs and arthritis. Got first injection in neck a few weeks ago, which has improved headaches and numbness- is scheduled for repeat injection next month. Pain management provided her a temporary handicap placard but was told that she would need to get her next one from her PCP if she needed it for longer periods of time. Was tried on gabapentin at last appointment for low back pain/radiculopathy. States she has been taking 300mg BID which has been helping. States she tried to increase to TID, but made her too tired. Had mammogram last week, needs further imaging for abnormal findings. Will be getting US and diagnostic mammogram next week. Mood:  States that due to continued deaths in the family and ongoing medical issues, mood has been worse over the past few months. No SI/HI.         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    Left lumbar radiculopathy 9/2016    Obesity, Class III, BMI 40-49.9 (morbid obesity) ([address]  Osteoarthritis Knee     bilateral osteoarthritis of knees and patellofemoral-followed by Dr Gbao Azul Paroxysmal tachycardia unspecified     with heart rate 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; (?AVNRT)    Prediabetes 2015    Smoker         Past Surgical History:   Procedure Laterality Date     SECTION      COLONOSCOPY  2021    three colon polyps    COLONOSCOPY N/A 2021    COLONOSCOPY POLYPECTOMY SNARE/COLD BIOPSY performed by Salma Griffin MD at [place] [address]      KNEE ARTHROSCOPY Left     LAPAROSCOPIC APPENDECTOMY  2013    NCIOLE AND BSO  2011       Social History     Tobacco Use    Smoking status: Former Smoker     Packs/day: 0.25     Years: 33.00     Pack years: 8.25     Types: Cigarettes     Start date: 1985     Quit date: 2020     Years since quittin.4    Smokeless tobacco: Never Used   Substance Use Topics    Alcohol use: No     Alcohol/week: 0.0 standard drinks     Comment: rare        Review of Systems   Constitutional: Negative for activity change, appetite change, chills, fever and unexpected weight change. HENT: Negative for congestion and sore throat. Respiratory: Negative for chest tightness and shortness of breath. Cardiovascular: Negative for chest pain and palpitations. Gastrointestinal: Negative for abdominal pain, constipation, diarrhea and vomiting. Genitourinary: Negative for dysuria. Musculoskeletal: Positive for arthralgias, back pain and neck pain. Skin: Negative for color change. Neurological: Positive for numbness and headaches. Negative for dizziness and light-headedness. Psychiatric/Behavioral: Positive for dysphoric mood. Negative for self-injury and suicidal ideas. The patient is nervous/anxious. Prior to Visit Medications    Medication Sig Taking?  Authorizing Provider   DULoxetine (CYMBALTA) 30 MG extended release capsule Take 1 capsule by mouth daily  Malou Centeno MD   gabapentin (NEURONTIN) 100 MG capsule Take up to 300mg twice a day  Adiel Carlton MD   calcium citrate-vitamin D (CITRICAL + D) 315-250 MG-UNIT TABS per tablet Take 1 tablet by mouth 2 times daily (with meals)  Historical Provider, MD   Multiple Vitamins-Minerals (HAIR/SKIN/NAILS/BIOTIN PO) Take by mouth  Historical Provider, MD   SODIUM FLUORIDE, DENTAL GEL, (SF) 1.1 % GEL Place onto teeth nightly  Historical Provider, MD   Turmeric (QC TUMERIC COMPLEX PO) Take 1,000 Units by mouth 2 times daily  Historical Provider, MD   cyanocobalamin ([employer] VITAMIN B12) 1000 MCG tablet Take 1 tablet by mouth every morning  Historical Provider, MD          Objective:      /78 (Site: Right Upper Arm, Position: Sitting, Cuff Size: Medium Adult)   Pulse 76   Temp 97.8 °F (36.6 °C)   Ht 5' 4\" (1.626 m)   Wt 237 lb (107.5 kg)   SpO2 98%   BMI 40.68 kg/m²      Physical Exam  Vitals and nursing note reviewed. Constitutional:       General: She is not in acute distress. Appearance: Normal appearance. She is obese. She is not ill-appearing or toxic-appearing. HENT:      Head: Normocephalic and atraumatic. Right Ear: External ear normal.      Left Ear: External ear normal.      Nose: Nose normal.      Mouth/Throat:      Pharynx: Oropharynx is clear. Eyes:      General: No scleral icterus. Right eye: No discharge. Left eye: No discharge. Extraocular Movements: Extraocular movements intact. Conjunctiva/sclera: Conjunctivae normal.   Cardiovascular:      Rate and Rhythm: Normal rate and regular rhythm. Heart sounds: Normal heart sounds. Pulmonary:      Effort: Pulmonary effort is normal.      Breath sounds: Normal breath sounds. No wheezing or rales. Musculoskeletal:         General: No deformity. Cervical back: Normal range of motion and neck supple. No rigidity. Skin:     General: Skin is warm and dry. Findings: No rash. Neurological:      General: No focal deficit present. Mental Status: She is alert. Mental status is at baseline. Motor: No weakness. Psychiatric:         Mood and Affect: Mood normal.         Behavior: Behavior normal.            Assessment / Plan:      1. Anxiety and depression  Worsening symptoms due to ongoing stressors, will increase dose of cymbalta (especially given chronic back pain and potential fibromyalgia)- discussed that medication increase may benefit multiple issues. Will follow up in 6 weeks. - DULoxetine (CYMBALTA) 60 MG extended release capsule; Take 1 capsule by mouth daily  Dispense: 90 capsule; Refill: 1    2. Left lumbar radiculopathy  Has had improvement with gabapentin and MIGUEL per pain management. Will provide extension of handicap placard for a year until patient can get knee replaced and complete injections in neck and back. - Handicap Placard MISC; by Does not apply route Good for 1 year  Dispense: 1 each; Refill: 0  - gabapentin (NEURONTIN) 300 MG capsule; Take 1 capsule by mouth 2 times daily for 180 days. Intended supply: 90 days  Dispense: 180 capsule; Refill: 1    3. Obesity (BMI 35.0-39.9 without comorbidity)  Discussed that weight loss will help arthritis and back pain. Encouraged lifestyle modifications. 4. Neck pain  Has had improvement with MIGUEL, continue following with pain management.    - gabapentin (NEURONTIN) 300 MG capsule; Take 1 capsule by mouth 2 times daily for 180 days. Intended supply: 90 days  Dispense: 180 capsule; Refill: 1          I have spent 40 minutes on this patient encounter. Patient voiced understanding and agreement with plan. All questions/concerns were addressed, risks/side effects of medications were reviewed. Return precautions and after visit summary were provided. Return in 6 weeks (on 1/25/2022), or if symptoms worsen or fail to improve. or earlier as needed.       Klarissa Salgado MD

## 2022-01-18 ENCOUNTER — NURSE TRIAGE (OUTPATIENT)
Dept: OTHER | Facility: CLINIC | Age: 52
End: 2022-01-18

## 2022-01-18 ENCOUNTER — OFFICE VISIT (OUTPATIENT)
Dept: INTERNAL MEDICINE CLINIC | Age: 52
End: 2022-01-18
Payer: COMMERCIAL

## 2022-01-18 VITALS
DIASTOLIC BLOOD PRESSURE: 70 MMHG | BODY MASS INDEX: 41.2 KG/M2 | OXYGEN SATURATION: 99 % | HEART RATE: 56 BPM | SYSTOLIC BLOOD PRESSURE: 128 MMHG | WEIGHT: 240 LBS

## 2022-01-18 DIAGNOSIS — R21 RASH: Primary | ICD-10-CM

## 2022-01-18 PROCEDURE — 99213 OFFICE O/P EST LOW 20 MIN: CPT | Performed by: FAMILY MEDICINE

## 2022-01-18 RX ORDER — CEPHALEXIN 500 MG/1
500 CAPSULE ORAL 2 TIMES DAILY
Qty: 14 CAPSULE | Refills: 0 | Status: SHIPPED | OUTPATIENT
Start: 2022-01-18 | End: 2022-01-31 | Stop reason: ALTCHOICE

## 2022-01-18 ASSESSMENT — PATIENT HEALTH QUESTIONNAIRE - PHQ9
SUM OF ALL RESPONSES TO PHQ QUESTIONS 1-9: 6
4. FEELING TIRED OR HAVING LITTLE ENERGY: 1
SUM OF ALL RESPONSES TO PHQ QUESTIONS 1-9: 6
10. IF YOU CHECKED OFF ANY PROBLEMS, HOW DIFFICULT HAVE THESE PROBLEMS MADE IT FOR YOU TO DO YOUR WORK, TAKE CARE OF THINGS AT HOME, OR GET ALONG WITH OTHER PEOPLE: 1
7. TROUBLE CONCENTRATING ON THINGS, SUCH AS READING THE NEWSPAPER OR WATCHING TELEVISION: 1
9. THOUGHTS THAT YOU WOULD BE BETTER OFF DEAD, OR OF HURTING YOURSELF: 0
SUM OF ALL RESPONSES TO PHQ QUESTIONS 1-9: 6
5. POOR APPETITE OR OVEREATING: 1
3. TROUBLE FALLING OR STAYING ASLEEP: 1
1. LITTLE INTEREST OR PLEASURE IN DOING THINGS: 1
SUM OF ALL RESPONSES TO PHQ9 QUESTIONS 1 & 2: 2
2. FEELING DOWN, DEPRESSED OR HOPELESS: 1
SUM OF ALL RESPONSES TO PHQ QUESTIONS 1-9: 6
6. FEELING BAD ABOUT YOURSELF - OR THAT YOU ARE A FAILURE OR HAVE LET YOURSELF OR YOUR FAMILY DOWN: 0

## 2022-01-18 ASSESSMENT — ENCOUNTER SYMPTOMS
CHEST TIGHTNESS: 0
SHORTNESS OF BREATH: 0
COLOR CHANGE: 1

## 2022-01-18 NOTE — TELEPHONE ENCOUNTER
Received call from Mercedez Santiago at [facility] with Red Flag Complaint. Subjective: Caller states \"bit by some insect on Sunday now has redness and swelling\"     Current Symptoms: redness and swelling, warm to touch and itchey , getting worse    Onset: 3 days    Associated Symptoms: NA    Pain Severity: 3/10 but more painful to touch    Temperature:none    What has been tried: benadryl    LMP: NA Pregnant: No    Recommended disposition: to be seen today    Care advice provided, patient verbalizes understanding; denies any other questions or concerns; instructed to call back for any new or worsening symptoms. Writer provided warm transfer to [place] at [facility] for appointment scheduling     Attention Provider: Thank you for allowing me to participate in the care of your patient. The patient was connected to triage in response to information provided to the ECC/PSC. Please do not respond through this encounter as the response is not directed to a shared pool.       Reason for Disposition   Red or very tender (to touch) area, getting larger over 48 hours after the bite    Protocols used: INSECT BITE-ADULT-[place]

## 2022-01-18 NOTE — PROGRESS NOTES
Subjective:      Chief Complaint   Patient presents with    Rash     has rash/bites on on left arm       HPI:  Chhaya Larios is a 46 y.o. female who presents today for rash on L arm. States she had an insect bite on the back of her L arm 2 days ago. Redness has been spreading since then, area is hot, swollen, tender. Is also very itchy- has been using benadryl and benadryl cream, which helps her sleep at night, but has not improved the redness. Denies any history of reactions to insect bites. No other associated symptoms.         Past Medical History:   Diagnosis Date    Acne vulgaris     Anxiety and depression 2018    Cancer [facility])     tonsil cancer    Family history of breast cancer     mother and 5 females in her family with CA breast;    GERD (gastroesophageal reflux disease)     HBO-Osteoradionecrosis of jaw 2021    Left lumbar radiculopathy 2016    Obesity, Class III, BMI 40-49.9 (morbid obesity) ([address]     Osteoarthritis Knee     bilateral osteoarthritis of knees and patellofemoral-followed by Dr Darlyn Montiel Paroxysmal tachycardia unspecified     with heart rate 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; (?AVNRT)    Prediabetes     Smoker         Past Surgical History:   Procedure Laterality Date     SECTION      COLONOSCOPY  2021    three colon polyps    COLONOSCOPY N/A 2021    COLONOSCOPY POLYPECTOMY SNARE/COLD BIOPSY performed by Olga Richards MD at [place] [address]      KNEE ARTHROSCOPY Left     LAPAROSCOPIC APPENDECTOMY  2013    NICOLE AND BSO  2011       Social History     Tobacco Use    Smoking status: Former Smoker     Packs/day: 0.25     Years: 33.00     Pack years: 8.25     Types: Cigarettes     Start date: 1985     Quit date: 2020     Years since quittin.5    Smokeless tobacco: Never Used   Substance Use Topics    Alcohol use: No     Alcohol/week: 0.0 standard drinks     Comment: rare        Review of Systems   Constitutional: Negative for fever. Respiratory: Negative for chest tightness and shortness of breath. Skin: Positive for color change and rash. Prior to Visit Medications    Medication Sig Taking? Authorizing Provider   Naproxen Sodium  MG TB24 TAKE TWO TABLETS TWICE A DAY AS NEEDED. Yes Historical Provider, MD   Handicap Placard MISC by Does not apply route Good for 1 year Yes Steve Odell MD   DULoxetine (CYMBALTA) 60 MG extended release capsule Take 1 capsule by mouth daily Yes Steve Odell MD   gabapentin (NEURONTIN) 300 MG capsule Take 1 capsule by mouth 2 times daily for 180 days. Intended supply: 90 days Yes Steve Odell MD   calcium citrate-vitamin D (CITRICAL + D) 315-250 MG-UNIT TABS per tablet Take 1 tablet by mouth 2 times daily (with meals) Yes Historical Provider, MD   Multiple Vitamins-Minerals (HAIR/SKIN/NAILS/BIOTIN PO) Take by mouth Yes Historical Provider, MD   SODIUM FLUORIDE, DENTAL GEL, (SF) 1.1 % GEL Place onto teeth nightly Yes Historical Provider, MD   Turmeric (QC TUMERIC COMPLEX PO) Take 1,000 Units by mouth 2 times daily Yes Historical Provider, MD   cyanocobalamin ([employer] VITAMIN B12) 1000 MCG tablet Take 1 tablet by mouth every morning Yes Historical Provider, MD          Objective:      /70   Pulse 56   Wt 240 lb (108.9 kg)   SpO2 99%   BMI 41.20 kg/m²      Physical Exam  Skin:     Findings: Erythema and rash (large raised erythematous area over majority of posterior L upper arm, borders well demarcated, mildly tender to touch) present. Assessment / Plan:      1. Rash  Allergic reaction vs. cellulitis 2/2 insect bite. Suspect reaction, but given rapidly spreading erythema, will treat with antibiotics. Patient just had MIGUEL today, will hold off on oral steroids for now. Advised to contact clinic if symptoms not improving with antibiotics- will try steroid burst at that time. May continue benadryl in the meantime.     - cephALEXin (Drake Maradiaga) 500 MG capsule; Take 1 capsule by mouth 2 times daily  Dispense: 14 capsule; Refill: 0          I have spent 20 minutes on this patient encounter. Patient voiced understanding and agreement with plan. All questions/concerns were addressed, risks/side effects of medications were reviewed. Return precautions and after visit summary were provided. Return if symptoms worsen or fail to improve. or earlier as needed.       Nick Arce MD

## 2022-01-24 ENCOUNTER — HOSPITAL ENCOUNTER (OUTPATIENT)
Age: 52
Discharge: HOME | End: 2022-01-24
Payer: COMMERCIAL

## 2022-01-24 ENCOUNTER — TELEPHONE (OUTPATIENT)
Dept: INTERNAL MEDICINE CLINIC | Age: 52
End: 2022-01-24

## 2022-01-24 DIAGNOSIS — C09.9 TONSIL CANCER (HCC): ICD-10-CM

## 2022-01-24 DIAGNOSIS — E78.5 HYPERLIPIDEMIA, UNSPECIFIED HYPERLIPIDEMIA TYPE: ICD-10-CM

## 2022-01-24 LAB
ALBUMIN SERPL-MCNC: 4 GM/DL (ref 3.4–5)
ALP BLD-CCNC: 79 IU/L (ref 40–128)
ALT SERPL-CCNC: 23 U/L (ref 10–40)
ANION GAP SERPL CALCULATED.3IONS-SCNC: 7 MMOL/L (ref 4–16)
AST SERPL-CCNC: 14 IU/L (ref 15–37)
BASOPHILS ABSOLUTE: 0 K/CU MM
BASOPHILS RELATIVE PERCENT: 0.6 % (ref 0–1)
BILIRUB SERPL-MCNC: 0.5 MG/DL (ref 0–1)
BUN BLDV-MCNC: 16 MG/DL (ref 6–23)
CALCIUM SERPL-MCNC: 9.2 MG/DL (ref 8.3–10.6)
CHLORIDE BLD-SCNC: 104 MMOL/L (ref 99–110)
CHOLESTEROL: 209 MG/DL
CO2: 30 MMOL/L (ref 21–32)
CREAT SERPL-MCNC: 0.8 MG/DL (ref 0.6–1.1)
DIFFERENTIAL TYPE: ABNORMAL
EOSINOPHILS ABSOLUTE: 0.1 K/CU MM
EOSINOPHILS RELATIVE PERCENT: 1.8 % (ref 0–3)
ESTIMATED AVERAGE GLUCOSE: 123 MG/DL
GFR AFRICAN AMERICAN: >60 ML/MIN/1.73M2
GFR NON-AFRICAN AMERICAN: >60 ML/MIN/1.73M2
GLUCOSE BLD-MCNC: 96 MG/DL (ref 70–99)
HBA1C MFR BLD: 5.9 % (ref 4.2–6.3)
HCT VFR BLD CALC: 46.1 % (ref 37–47)
HDLC SERPL-MCNC: 81 MG/DL
HEMOGLOBIN: 14.4 GM/DL (ref 12.5–16)
IMMATURE NEUTROPHIL %: 0.6 % (ref 0–0.43)
LDL CHOLESTEROL DIRECT: 123 MG/DL
LYMPHOCYTES ABSOLUTE: 0.9 K/CU MM
LYMPHOCYTES RELATIVE PERCENT: 12.9 % (ref 24–44)
MCH RBC QN AUTO: 29.7 PG (ref 27–31)
MCHC RBC AUTO-ENTMCNC: 31.2 % (ref 32–36)
MCV RBC AUTO: 95.1 FL (ref 78–100)
MONOCYTES ABSOLUTE: 0.5 K/CU MM
MONOCYTES RELATIVE PERCENT: 6.9 % (ref 0–4)
NUCLEATED RBC %: 0 %
PDW BLD-RTO: 13.2 % (ref 11.7–14.9)
PLATELET # BLD: 241 K/CU MM (ref 140–440)
PMV BLD AUTO: 9.6 FL (ref 7.5–11.1)
POTASSIUM SERPL-SCNC: 4.8 MMOL/L (ref 3.5–5.1)
RBC # BLD: 4.85 M/CU MM (ref 4.2–5.4)
SEGMENTED NEUTROPHILS ABSOLUTE COUNT: 5.5 K/CU MM
SEGMENTED NEUTROPHILS RELATIVE PERCENT: 77.2 % (ref 36–66)
SODIUM BLD-SCNC: 141 MMOL/L (ref 135–145)
TOTAL IMMATURE NEUTOROPHIL: 0.04 K/CU MM
TOTAL NUCLEATED RBC: 0 K/CU MM
TOTAL PROTEIN: 6.3 GM/DL (ref 6.4–8.2)
TRIGL SERPL-MCNC: 75 MG/DL
TSH HIGH SENSITIVITY: 2.16 UIU/ML (ref 0.27–4.2)
WBC # BLD: 7.2 K/CU MM (ref 4–10.5)

## 2022-01-24 PROCEDURE — 80061 LIPID PANEL: CPT

## 2022-01-24 PROCEDURE — 85025 COMPLETE CBC W/AUTO DIFF WBC: CPT

## 2022-01-24 PROCEDURE — 36415 COLL VENOUS BLD VENIPUNCTURE: CPT

## 2022-01-24 PROCEDURE — 84443 ASSAY THYROID STIM HORMONE: CPT

## 2022-01-24 PROCEDURE — 80053 COMPREHEN METABOLIC PANEL: CPT

## 2022-01-24 PROCEDURE — 83036 HEMOGLOBIN GLYCOSYLATED A1C: CPT

## 2022-01-24 PROCEDURE — 83721 ASSAY OF BLOOD LIPOPROTEIN: CPT

## 2022-01-24 RX ORDER — PREDNISONE 20 MG/1
20 TABLET ORAL 2 TIMES DAILY
Qty: 10 TABLET | Refills: 0 | Status: SHIPPED | OUTPATIENT
Start: 2022-01-24 | End: 2022-01-29

## 2022-01-31 ENCOUNTER — OFFICE VISIT (OUTPATIENT)
Dept: INTERNAL MEDICINE CLINIC | Age: 52
End: 2022-01-31
Payer: COMMERCIAL

## 2022-01-31 VITALS
HEIGHT: 64 IN | WEIGHT: 240 LBS | TEMPERATURE: 97.5 F | SYSTOLIC BLOOD PRESSURE: 128 MMHG | BODY MASS INDEX: 40.97 KG/M2 | OXYGEN SATURATION: 99 % | HEART RATE: 99 BPM | DIASTOLIC BLOOD PRESSURE: 86 MMHG

## 2022-01-31 DIAGNOSIS — R73.03 PREDIABETES: ICD-10-CM

## 2022-01-31 DIAGNOSIS — E78.49 OTHER HYPERLIPIDEMIA: ICD-10-CM

## 2022-01-31 DIAGNOSIS — F41.9 ANXIETY AND DEPRESSION: Primary | ICD-10-CM

## 2022-01-31 DIAGNOSIS — C09.9 TONSIL CANCER (HCC): ICD-10-CM

## 2022-01-31 DIAGNOSIS — F32.A ANXIETY AND DEPRESSION: Primary | ICD-10-CM

## 2022-01-31 PROCEDURE — 99213 OFFICE O/P EST LOW 20 MIN: CPT | Performed by: FAMILY MEDICINE

## 2022-01-31 ASSESSMENT — ENCOUNTER SYMPTOMS
SORE THROAT: 0
DIARRHEA: 0
COLOR CHANGE: 0
VOMITING: 0
SHORTNESS OF BREATH: 0
CHEST TIGHTNESS: 0
ABDOMINAL PAIN: 0
CONSTIPATION: 0

## 2022-01-31 NOTE — PROGRESS NOTES
Subjective:      Chief Complaint   Patient presents with    6 Month Follow-Up       HPI:  Marry Munguia is a 46 y.o. female who presents today for follow up of chronic conditions as listed below. Was recently treated with antibiotics and steroids for rash on arm. States she had a little bit of improvement with the antibiotics, but symptoms completely resolved with steroids. Mood:  Cymbalta dose was increased about 6 weeks ago. States she has noticed improvement with higher dose. HLP:  States she has been told she has fatty liver. Has not been able to exercise due to chronic R knee pain (has been told it needs surgery). Feeling well today, no acute concerns. Labs reviewed.       The 10-year ASCVD risk score (Ramírez Madera, et al., 2013) is: 0.9%    Values used to calculate the score:      Age: 46 years      Sex: Female      Is Non- : No      Diabetic: No      Tobacco smoker: No      Systolic Blood Pressure: 552 mmHg      Is BP treated: No      HDL Cholesterol: 81 MG/DL      Total Cholesterol: 209 MG/DL     Past Medical History:   Diagnosis Date    Acne vulgaris     Anxiety and depression 2018    Cancer ([address] 2020    tonsil cancer    Family history of breast cancer     mother and 5 females in her family with CA breast;    GERD (gastroesophageal reflux disease)     HBO-Osteoradionecrosis of jaw 2021    Left lumbar radiculopathy 2016    Obesity, Class III, BMI 40-49.9 (morbid obesity) ([address]     Osteoarthritis Knee     bilateral osteoarthritis of knees and patellofemoral-followed by Dr Ac Mcallister Paroxysmal tachycardia unspecified     with heart rate 145; (?AVNRT)    Prediabetes     Smoker         Past Surgical History:   Procedure Laterality Date     SECTION      COLONOSCOPY  2021    three colon polyps    COLONOSCOPY N/A 2021    COLONOSCOPY POLYPECTOMY SNARE/COLD BIOPSY performed by Yara Monsalve MD at [address] [place] [address] ENDOMETRIAL ABLATION      KNEE ARTHROSCOPY Left     LAPAROSCOPIC APPENDECTOMY  2013    NICOLE AND BSO  2011       Social History     Tobacco Use    Smoking status: Former Smoker     Packs/day: 0.25     Years: 33.00     Pack years: 8.25     Types: Cigarettes     Start date: 1985     Quit date: 2020     Years since quittin.6    Smokeless tobacco: Never Used   Substance Use Topics    Alcohol use: No     Alcohol/week: 0.0 standard drinks     Comment: rare        Review of Systems   Constitutional: Negative for activity change, appetite change, chills, fever and unexpected weight change. HENT: Negative for congestion and sore throat. Respiratory: Negative for chest tightness and shortness of breath. Cardiovascular: Negative for chest pain and palpitations. Gastrointestinal: Negative for abdominal pain, constipation, diarrhea and vomiting. Genitourinary: Negative for dysuria. Skin: Negative for color change. Neurological: Negative for dizziness and light-headedness. Psychiatric/Behavioral: Negative for dysphoric mood. The patient is not nervous/anxious. Prior to Visit Medications    Medication Sig Taking? Authorizing Provider   cephALEXin (KEFLEX) 500 MG capsule Take 1 capsule by mouth 2 times daily Yes Jigar Gilmore MD   Naproxen Sodium  MG TB24 TAKE TWO TABLETS TWICE A DAY AS NEEDED. Yes Historical Provider, MD   Handicap Placard MISC by Does not apply route Good for 1 year Yes Jigar Gilmore MD   DULoxetine (CYMBALTA) 60 MG extended release capsule Take 1 capsule by mouth daily Yes iJgar Gilmore MD   gabapentin (NEURONTIN) 300 MG capsule Take 1 capsule by mouth 2 times daily for 180 days.  Intended supply: 90 days Yes Jigar Gilmore MD   calcium citrate-vitamin D (CITRICAL + D) 315-250 MG-UNIT TABS per tablet Take 1 tablet by mouth 2 times daily (with meals) Yes Historical Provider, MD   Multiple Vitamins-Minerals (HAIR/SKIN/NAILS/BIOTIN PO) Take by mouth Yes Historical Provider, MD   SODIUM FLUORIDE, DENTAL GEL, (SF) 1.1 % GEL Place onto teeth nightly Yes Historical Provider, MD   Turmeric (QC TUMERIC COMPLEX PO) Take 1,000 Units by mouth 2 times daily Yes Historical Provider, MD   cyanocobalamin ([employer] VITAMIN B12) 1000 MCG tablet Take 1 tablet by mouth every morning Yes Historical Provider, MD          Objective:      /86 (Site: Right Upper Arm, Position: Sitting, Cuff Size: Medium Adult)   Pulse 99   Temp 97.5 °F (36.4 °C)   Ht 5' 4\" (1.626 m)   Wt 240 lb (108.9 kg)   SpO2 99%   BMI 41.20 kg/m²      Physical Exam  Vitals and nursing note reviewed. Constitutional:       General: She is not in acute distress. Appearance: Normal appearance. She is not ill-appearing or toxic-appearing. HENT:      Head: Normocephalic and atraumatic. Right Ear: External ear normal.      Left Ear: External ear normal.      Nose: Nose normal.      Mouth/Throat:      Pharynx: Oropharynx is clear. Eyes:      General: No scleral icterus. Right eye: No discharge. Left eye: No discharge. Extraocular Movements: Extraocular movements intact. Conjunctiva/sclera: Conjunctivae normal.   Cardiovascular:      Rate and Rhythm: Normal rate and regular rhythm. Heart sounds: Normal heart sounds. Pulmonary:      Effort: Pulmonary effort is normal.      Breath sounds: Normal breath sounds. No wheezing or rales. Musculoskeletal:         General: No deformity. Cervical back: Normal range of motion and neck supple. No rigidity. Skin:     General: Skin is warm and dry. Findings: No rash. Neurological:      General: No focal deficit present. Mental Status: She is alert. Mental status is at baseline. Motor: No weakness. Psychiatric:         Mood and Affect: Mood normal.         Behavior: Behavior normal.            Assessment / Plan:      1.  Anxiety and depression  Symptoms improved with higher dose of cymbalta- will continue. 2. [address]onsil [address] ([facility] [address]  Last TSH wnl, will continue to monitor.   - TSH without Reflex; Future    3. Prediabetes  Slight increase in HbA1c. Discussed lifestyle modifications, will monitor.   - Hemoglobin A1C; Future  - Comprehensive Metabolic Panel; Future  - CBC Auto Differential; Future    4. Other hyperlipidemia  10 year ASCVD risk <10%, does not warrant statin therapy at this time. Encouraged dietary modifications as exercise is restricted 2/2 knee pain. Will monitor. I have spent 26 minutes on this patient encounter. Patient voiced understanding and agreement with plan. All questions/concerns were addressed, risks/side effects of medications were reviewed. Return precautions and after visit summary were provided. Return in about 6 months (around 7/31/2022). or earlier as needed.       Lydia Payne MD

## 2022-07-01 LAB — MAMMOGRAPHY, EXTERNAL: NORMAL

## 2022-07-25 ENCOUNTER — HOSPITAL ENCOUNTER (OUTPATIENT)
Age: 52
Discharge: HOME | End: 2022-07-25
Payer: COMMERCIAL

## 2022-07-25 ENCOUNTER — OFFICE VISIT (OUTPATIENT)
Dept: FAMILY MEDICINE CLINIC | Age: 52
End: 2022-07-25
Payer: COMMERCIAL

## 2022-07-25 ENCOUNTER — HOSPITAL ENCOUNTER (OUTPATIENT)
Dept: GENERAL RADIOLOGY | Age: 52
Discharge: HOME | End: 2022-07-25
Payer: COMMERCIAL

## 2022-07-25 VITALS
SYSTOLIC BLOOD PRESSURE: 118 MMHG | OXYGEN SATURATION: 97 % | WEIGHT: 249 LBS | BODY MASS INDEX: 47.01 KG/M2 | HEART RATE: 100 BPM | TEMPERATURE: 97.3 F | HEIGHT: 61 IN | DIASTOLIC BLOOD PRESSURE: 86 MMHG

## 2022-07-25 DIAGNOSIS — S99.921A INJURY OF RIGHT FOOT, INITIAL ENCOUNTER: ICD-10-CM

## 2022-07-25 DIAGNOSIS — R60.0 EDEMA OF RIGHT FOOT: ICD-10-CM

## 2022-07-25 DIAGNOSIS — S99.921A INJURY OF RIGHT FOOT, INITIAL ENCOUNTER: Primary | ICD-10-CM

## 2022-07-25 PROCEDURE — 99213 OFFICE O/P EST LOW 20 MIN: CPT | Performed by: NURSE PRACTITIONER

## 2022-07-25 PROCEDURE — 73630 X-RAY EXAM OF FOOT: CPT

## 2022-07-25 NOTE — PROGRESS NOTES
Fred Ashton is a 46 y.o. female. Chief Complaint   Patient presents with    Knee Injury     Has had a problem with sciatica in the past.  Recently dropped a large bottle of shampoo on her R foot. She has been have severe pain and swelling in the top of her foot and ankle. SUBJECTIVE:     ROSS Degroot is a 52yo F established patient who presents re: R foot pain and swelling x 2 wks. She dropped a 33 lb bottle of shampoo on the top of her right foot while showering 2 wks ago. The top of her foot was swollen within a short period of time and she noted associated bruising as well. Over the last 2 wks she continues to have pain and swelling on the top of foot in the area just in front of her 2nd through 4th toes. She has tried to elevate her foot which helps with the swelling but this has been difficult as her job requires her to be on her feet frequently. She already takes naproxen for chronic low back pain with sciatica. This hasn't brought much relief. She denies tingling/numbness in foot. She reports normal range of motion in ankle and toes but it hurts at the top of her foot when she moves her toes. She has been limping. Other history includes: tonsillar cancer, knee OA, HLD, osteonecrosis of jaw, and current cigarette smoker. Review of Systems  All other systems negative except what is noted in HPI. OBJECTIVE:      /86   Pulse 100   Temp 97.3 °F (36.3 °C) (Temporal)   Ht 5' 1\" (1.549 m)   Wt 249 lb (112.9 kg)   SpO2 97%   BMI 47.05 kg/m²       Physical Exam  Constitutional:       Appearance: Normal appearance. She is not toxic-appearing. HENT:      Head: Normocephalic and atraumatic. Mouth/Throat:      Mouth: Mucous membranes are moist.   Eyes:      Conjunctiva/sclera: Conjunctivae normal.   Pulmonary:      Effort: Pulmonary effort is normal.   Musculoskeletal:         General: Normal range of motion. Cervical back: Neck supple. Comments: Right foot: Inspection: No deformity. Edema noted at distal metatarsal region. Skin is intact without erythema or ecchymosis. Palpation: Bony tenderness noted at distal metatarsals 2-4. No specific ligament, tendon, or muscle tenderness. ROM: Full active ROM @ ankle + toes. Strength: Normal.  Neuro: Normal.   Vascular: No obvious compromise. Skin is warm and dry. Normal pulses. Tendon: Normal function. Skin:     General: Skin is warm. Neurological:      General: No focal deficit present. Mental Status: She is alert and oriented to person, place, and time. Psychiatric:         Mood and Affect: Mood normal.         Behavior: Behavior normal.       ASSESSMENT/PLAN:     Komal Fleming was seen today for knee injury. Diagnoses and all orders for this visit:    Injury of right foot, initial encounter  -     XR FOOT RIGHT (MIN 3 VIEWS); Future    Edema of right foot  -     XR FOOT RIGHT (MIN 3 VIEWS); Future    Discussed the following with Abigail:     Due to injury mechanism and persistent swelling and pain we will go ahead and obtain images of right foot to ensure that there is no bony abnormality I.e. subtle fracture. Will call with results. Meanwhile continue rest, ice, compression (ace wrap), and elevation (at work as well). Ace wrap applied. OK to add tylenol 1,000mg every 6-8hrs to naproxen. Follow-up if no improvement in symptoms in 2 wks. Komal Fleming indicates understanding and is agreeable to plan. No follow-ups on file. Current Outpatient Medications   Medication Sig Dispense Refill    Naproxen Sodium  MG TB24 TAKE TWO TABLETS TWICE A DAY AS NEEDED. Handicap Placard MISC by Does not apply route Good for 1 year 1 each 0    DULoxetine (CYMBALTA) 60 MG extended release capsule Take 1 capsule by mouth daily 90 capsule 1    gabapentin (NEURONTIN) 300 MG capsule Take 1 capsule by mouth 2 times daily for 180 days.  Intended supply: 90 days 180 capsule 1 calcium citrate-vitamin D (CITRICAL + D) 315-250 MG-UNIT TABS per tablet Take 1 tablet by mouth 2 times daily (with meals)      SODIUM FLUORIDE, DENTAL GEL, 1.1 % GEL Place onto teeth nightly      Turmeric (QC TUMERIC COMPLEX PO) Take 1,000 Units by mouth 2 times daily      cyanocobalamin 1000 MCG tablet Take 1 tablet by mouth every morning       No current facility-administered medications for this visit. Patient Instructions   Due to injury mechanism and persistent swelling and pain we will go ahead and obtain images of right foot to ensure that there is no bony abnormality I.e.fracture. Will call with results. Meanwhile continue rest, ice, compression (ace wrap), and elevation (at work as well). OK to add tylenol 1,000mg every 6-8hrs to naproxen. Follow-up if no improvement in symptoms in 2 wks.      ControlledSubstances Monitoring:

## 2022-07-25 NOTE — PATIENT INSTRUCTIONS
Due to injury mechanism and persistent swelling and pain we will go ahead and obtain images of right foot to ensure that there is no bony abnormality I.e.fracture. Will call with results. Meanwhile continue rest, ice, compression (ace wrap), and elevation (at work as well). OK to add tylenol 1,000mg every 6-8hrs to naproxen. Follow-up if no improvement in symptoms in 2 wks.

## 2022-08-05 ENCOUNTER — HOSPITAL ENCOUNTER (OUTPATIENT)
Age: 52
Discharge: HOME | End: 2022-08-05
Payer: COMMERCIAL

## 2022-08-05 LAB
ALBUMIN SERPL-MCNC: 4.3 GM/DL (ref 3.4–5)
ALP BLD-CCNC: 98 IU/L (ref 40–128)
ALT SERPL-CCNC: 59 U/L (ref 10–40)
ANION GAP SERPL CALCULATED.3IONS-SCNC: 11 MMOL/L (ref 4–16)
AST SERPL-CCNC: 37 IU/L (ref 15–37)
BASOPHILS ABSOLUTE: 0 K/CU MM
BASOPHILS RELATIVE PERCENT: 0.7 % (ref 0–1)
BILIRUB SERPL-MCNC: 0.4 MG/DL (ref 0–1)
BUN BLDV-MCNC: 12 MG/DL (ref 6–23)
CALCIUM SERPL-MCNC: 9 MG/DL (ref 8.3–10.6)
CHLORIDE BLD-SCNC: 106 MMOL/L (ref 99–110)
CO2: 26 MMOL/L (ref 21–32)
CREAT SERPL-MCNC: 0.7 MG/DL (ref 0.6–1.1)
DIFFERENTIAL TYPE: ABNORMAL
EOSINOPHILS ABSOLUTE: 0.2 K/CU MM
EOSINOPHILS RELATIVE PERCENT: 3.4 % (ref 0–3)
ESTIMATED AVERAGE GLUCOSE: 140 MG/DL
GFR AFRICAN AMERICAN: >60 ML/MIN/1.73M2
GFR NON-AFRICAN AMERICAN: >60 ML/MIN/1.73M2
GLUCOSE BLD-MCNC: 110 MG/DL (ref 70–99)
HBA1C MFR BLD: 6.5 % (ref 4.2–6.3)
HCT VFR BLD CALC: 44.3 % (ref 37–47)
HEMOGLOBIN: 14.2 GM/DL (ref 12.5–16)
HEPATITIS C ANTIBODY: NON REACTIVE
IMMATURE NEUTROPHIL %: 0.7 % (ref 0–0.43)
LYMPHOCYTES ABSOLUTE: 0.9 K/CU MM
LYMPHOCYTES RELATIVE PERCENT: 21.4 % (ref 24–44)
MCH RBC QN AUTO: 29.5 PG (ref 27–31)
MCHC RBC AUTO-ENTMCNC: 32.1 % (ref 32–36)
MCV RBC AUTO: 91.9 FL (ref 78–100)
MONOCYTES ABSOLUTE: 0.5 K/CU MM
MONOCYTES RELATIVE PERCENT: 11.4 % (ref 0–4)
NUCLEATED RBC %: 0 %
PDW BLD-RTO: 13.1 % (ref 11.7–14.9)
PLATELET # BLD: 252 K/CU MM (ref 140–440)
PMV BLD AUTO: 10.5 FL (ref 7.5–11.1)
POTASSIUM SERPL-SCNC: 4.7 MMOL/L (ref 3.5–5.1)
RBC # BLD: 4.82 M/CU MM (ref 4.2–5.4)
SEGMENTED NEUTROPHILS ABSOLUTE COUNT: 2.8 K/CU MM
SEGMENTED NEUTROPHILS RELATIVE PERCENT: 62.4 % (ref 36–66)
SODIUM BLD-SCNC: 143 MMOL/L (ref 135–145)
TOTAL IMMATURE NEUTOROPHIL: 0.03 K/CU MM
TOTAL NUCLEATED RBC: 0 K/CU MM
TOTAL PROTEIN: 6.4 GM/DL (ref 6.4–8.2)
TSH HIGH SENSITIVITY: 2.25 UIU/ML (ref 0.27–4.2)
WBC # BLD: 4.4 K/CU MM (ref 4–10.5)

## 2022-08-05 PROCEDURE — 86803 HEPATITIS C AB TEST: CPT

## 2022-08-05 PROCEDURE — 83036 HEMOGLOBIN GLYCOSYLATED A1C: CPT

## 2022-08-05 PROCEDURE — 85025 COMPLETE CBC W/AUTO DIFF WBC: CPT

## 2022-08-05 PROCEDURE — 80053 COMPREHEN METABOLIC PANEL: CPT

## 2022-08-05 PROCEDURE — 36415 COLL VENOUS BLD VENIPUNCTURE: CPT

## 2022-08-05 PROCEDURE — 84443 ASSAY THYROID STIM HORMONE: CPT

## 2022-08-09 ENCOUNTER — OFFICE VISIT (OUTPATIENT)
Dept: INTERNAL MEDICINE CLINIC | Age: 52
End: 2022-08-09
Payer: COMMERCIAL

## 2022-08-09 VITALS
HEIGHT: 61 IN | HEART RATE: 90 BPM | WEIGHT: 252 LBS | DIASTOLIC BLOOD PRESSURE: 78 MMHG | OXYGEN SATURATION: 99 % | SYSTOLIC BLOOD PRESSURE: 128 MMHG | BODY MASS INDEX: 47.58 KG/M2

## 2022-08-09 DIAGNOSIS — F32.A ANXIETY AND DEPRESSION: ICD-10-CM

## 2022-08-09 DIAGNOSIS — F41.9 ANXIETY AND DEPRESSION: ICD-10-CM

## 2022-08-09 DIAGNOSIS — M25.50 ARTHRALGIA, UNSPECIFIED JOINT: ICD-10-CM

## 2022-08-09 DIAGNOSIS — R07.9 CHEST PAIN, UNSPECIFIED TYPE: Primary | ICD-10-CM

## 2022-08-09 DIAGNOSIS — R73.03 PREDIABETES: ICD-10-CM

## 2022-08-09 DIAGNOSIS — E66.01 CLASS 3 SEVERE OBESITY DUE TO EXCESS CALORIES WITHOUT SERIOUS COMORBIDITY WITH BODY MASS INDEX (BMI) OF 45.0 TO 49.9 IN ADULT (HCC): ICD-10-CM

## 2022-08-09 PROBLEM — E66.813 CLASS 3 SEVERE OBESITY DUE TO EXCESS CALORIES WITHOUT SERIOUS COMORBIDITY WITH BODY MASS INDEX (BMI) OF 45.0 TO 49.9 IN ADULT: Status: ACTIVE | Noted: 2022-08-09

## 2022-08-09 PROCEDURE — 93000 ELECTROCARDIOGRAM COMPLETE: CPT | Performed by: FAMILY MEDICINE

## 2022-08-09 PROCEDURE — 99215 OFFICE O/P EST HI 40 MIN: CPT | Performed by: FAMILY MEDICINE

## 2022-08-09 RX ORDER — FLUOXETINE 10 MG/1
10 CAPSULE ORAL DAILY
Qty: 30 CAPSULE | Refills: 1 | Status: SHIPPED
Start: 2022-08-09 | End: 2022-08-30 | Stop reason: DRUGHIGH

## 2022-08-09 SDOH — ECONOMIC STABILITY: FOOD INSECURITY: WITHIN THE PAST 12 MONTHS, YOU WORRIED THAT YOUR FOOD WOULD RUN OUT BEFORE YOU GOT MONEY TO BUY MORE.: NEVER TRUE

## 2022-08-09 SDOH — ECONOMIC STABILITY: FOOD INSECURITY: WITHIN THE PAST 12 MONTHS, THE FOOD YOU BOUGHT JUST DIDN'T LAST AND YOU DIDN'T HAVE MONEY TO GET MORE.: NEVER TRUE

## 2022-08-09 ASSESSMENT — ENCOUNTER SYMPTOMS
CONSTIPATION: 0
CHEST TIGHTNESS: 1
SORE THROAT: 0
SHORTNESS OF BREATH: 0
VOMITING: 0
COLOR CHANGE: 0
BACK PAIN: 1
ABDOMINAL PAIN: 0
DIARRHEA: 0

## 2022-08-09 ASSESSMENT — SOCIAL DETERMINANTS OF HEALTH (SDOH): HOW HARD IS IT FOR YOU TO PAY FOR THE VERY BASICS LIKE FOOD, HOUSING, MEDICAL CARE, AND HEATING?: NOT VERY HARD

## 2022-08-09 NOTE — PROGRESS NOTES
Subjective:      Chief Complaint   Patient presents with    6 Month Follow-Up    Swelling     Bilateral hand and feet    Joint Pain     All joints    Shortness of Breath    Chest Pain     For the past 3 months - gradual no sudden onset      Headache     Had CT scan of head at [facility] by Dr. Sarah Beth Enriquez    Weight Gain       HPI:  Stacey Carter is a 46 y.o. female who presents today for follow up of chronic conditions as listed below. Patient states she started noticing swelling in her fingers in the morning about 2-3 months ago. Symptoms do seem to improve a little if she takes naproxen and tylenol. Pain worse in PIP, also has symptoms in MCP joints. Also noticed generalized joint aches that started at about the same amount of time (hips, knees, feet, ankles). Has known severe arthritis in her knees- has been told by her orthopedist that she will need a knee replacement. No known family history of RA or autoimmune disorders, but patient states her mother had the same symptoms for several years and was told it was osteoarthritis. States she has also has L sided upper back pain that sometimes radiates into her neck and chest.  Sometimes has intermittent chest pain/pressure- occurs randomly, both at rest and with activity, has been occurring for the past 4-5 months. Usually resolves on its own in several minutes. States she is having symptoms now. Does have family history of heart disease. Has been evaluated by cardiology in the past, but has been over 10 years ago. States that she does sometimes have pain radiating into L arm, but has always attributed that to her neck issues. Is currently getting injections in neck and lower back by pain management. Has had all parts of her spine imaged. States she discontinued cymbalta on her own due to sweats/hot flashes. Has been off of medication for about 3 weeks. Hot sweats have resolved, but does feel she needs something for her anxiety.   Has tried wellbutrin in the past, but had side effects. Has also tried lexapro, but cannot remember why it was discontinued. The 10-year ASCVD risk score (Joceline Chen., et al., 2013) is: 0.9%    Values used to calculate the score:      Age: 46 years      Sex: Female      Is Non- : No      Diabetic: No      Tobacco smoker: No      Systolic Blood Pressure: 775 mmHg      Is BP treated: No      HDL Cholesterol: 81 MG/DL      Total Cholesterol: 209 MG/DL     Past Medical History:   Diagnosis Date    Acne vulgaris     Anxiety and depression 2018    Cancer ([facility] 2020    tonsil cancer    Family history of breast cancer     mother and 5 females in her family with CA breast;    GERD (gastroesophageal reflux disease)     HBO-Osteoradionecrosis of jaw 2021    Left lumbar radiculopathy 2016    Obesity, Class III, BMI 40-49.9 (morbid obesity) ([facility]     Osteoarthritis Knee     bilateral osteoarthritis of knees and patellofemoral-followed by Dr Aj Cardona    Paroxysmal tachycardia unspecified     with heart rate 145; (?AVNRT)    Prediabetes     Smoker         Past Surgical History:   Procedure Laterality Date     SECTION      COLONOSCOPY  2021    three colon polyps    COLONOSCOPY N/A 2021    COLONOSCOPY POLYPECTOMY SNARE/COLD BIOPSY performed by López Douglas MD at [place] [address] ARTHROSCOPY Left     LAPAROSCOPIC APPENDECTOMY  2013    NICOLE AND BSO (CERVIX REMOVED)  2011       Social History     Tobacco Use    Smoking status: Former     Packs/day: 0.25     Years: 33.00     Pack years: 8.25     Types: Cigarettes     Start date: 1985     Quit date: 2020     Years since quittin.1    Smokeless tobacco: Never   Substance Use Topics    Alcohol use: No     Alcohol/week: 0.0 standard drinks     Comment: rare        Review of Systems   Constitutional:  Positive for unexpected weight change.  Negative for activity change, appetite change, chills and fever. HENT:  Negative for congestion and sore throat. Respiratory:  Positive for chest tightness. Negative for shortness of breath. Cardiovascular:  Negative for chest pain and palpitations. Gastrointestinal:  Negative for abdominal pain, constipation, diarrhea and vomiting. Genitourinary:  Negative for dysuria. Musculoskeletal:  Positive for arthralgias, back pain, joint swelling, myalgias and neck pain. Skin:  Negative for color change. Neurological:  Negative for dizziness and light-headedness. Psychiatric/Behavioral:  Negative for dysphoric mood. The patient is nervous/anxious. Prior to Visit Medications    Medication Sig Taking? Authorizing Provider   Naproxen Sodium  MG TB24 TAKE TWO TABLETS TWICE A DAY AS NEEDED. Yes Historical Provider, MD   Handicap Placard MISC by Does not apply route Good for 1 year Yes Malou Centeno MD   SODIUM FLUORIDE, DENTAL GEL, 1.1 % GEL Place onto teeth nightly Yes Historical Provider, MD   DULoxetine (CYMBALTA) 60 MG extended release capsule Take 1 capsule by mouth daily  Patient not taking: Reported on 8/9/2022  Malou Centeno MD   gabapentin (NEURONTIN) 300 MG capsule Take 1 capsule by mouth 2 times daily for 180 days.  Intended supply: 90 days  Malou Centeno MD   calcium citrate-vitamin D (CITRICAL + D) 315-250 MG-UNIT TABS per tablet Take 1 tablet by mouth 2 times daily (with meals)  Patient not taking: Reported on 8/9/2022  Historical Provider, MD   Turmeric (QC TUMERIC COMPLEX PO) Take 1,000 Units by mouth 2 times daily  Patient not taking: Reported on 8/9/2022  Historical Provider, MD   cyanocobalamin 1000 MCG tablet Take 1 tablet by mouth every morning  Patient not taking: Reported on 8/9/2022  Historical Provider, MD          Objective:      /78 (Site: Right Lower Arm, Position: Sitting, Cuff Size: Medium Adult)   Pulse 90   Ht 5' 1\" (1.549 m)   Wt 252 lb (114.3 kg)   SpO2 99%   BMI 47.61 kg/m² Physical Exam  Vitals and nursing note reviewed. Constitutional:       General: She is not in acute distress. Appearance: Normal appearance. She is obese. She is not ill-appearing or toxic-appearing. HENT:      Head: Normocephalic and atraumatic. Right Ear: External ear normal.      Left Ear: External ear normal.      Nose: Nose normal.      Mouth/Throat:      Pharynx: Oropharynx is clear. Eyes:      General: No scleral icterus. Right eye: No discharge. Left eye: No discharge. Extraocular Movements: Extraocular movements intact. Conjunctiva/sclera: Conjunctivae normal.   Cardiovascular:      Rate and Rhythm: Normal rate and regular rhythm. Heart sounds: Normal heart sounds. Pulmonary:      Effort: Pulmonary effort is normal.      Breath sounds: Normal breath sounds. No wheezing or rales. Musculoskeletal:         General: Swelling (swelling in R foot) present. No deformity. Cervical back: Normal range of motion and neck supple. No rigidity. Skin:     General: Skin is warm and dry. Findings: No rash. Neurological:      General: No focal deficit present. Mental Status: She is alert. Mental status is at baseline. Motor: No weakness. Psychiatric:         Mood and Affect: Mood normal.         Behavior: Behavior normal.          Assessment / Plan:      1. Chest pain, unspecified type  4-5 month history of chest pain, no acute findings on EKG today. Given family history of cardiovascular disease and comorbidities, will refer for further cardiac evaluation. Report to ER for any new/worsening symptoms.   [facility], [address] Performed; Future  - EKG 12 Lead - Clinic Performed    2. Arthralgia, unspecified joint  Acute onset, generalized arthritis. Given nature of symptoms, will screen for RA/autoimmune causes of arthritis. If all evaluation negative, could attribute to fibromyalgia. - BOB;  Future  - Sedimentation Rate; Future  - C-Reactive Protein; Future  - RHEUMATOID FACTOR; Future  - CYCLIC CITRUL PEPTIDE ANTIBODY, IGG; Future    3. Anxiety and depression  Patient has self discontinued cymbalta 2/2 side effects. Will try starting prozac and follow up in 4 weeks. - FLUoxetine (PROZAC) 10 MG capsule; Take 1 capsule by mouth in the morning. Dispense: 30 capsule; Refill: 1    4. Class 3 severe obesity due to excess calories without serious comorbidity with body mass index (BMI) of 45.0 to 49.9 in adult [facility])  Encouraged lifestyle modifications. Will discuss referral to weight loss clinic at follow up next month. 5. Prediabetes  New diagnosis, discussed lifestyle modifications. Will monitor. I have spent 45 minutes on this patient encounter. Patient voiced understanding and agreement with plan. All questions/concerns were addressed, risks/side effects of medications were reviewed. Return precautions and after visit summary were provided. Return in about 4 weeks (around 9/6/2022). or earlier as needed.       Lashonda Kathleen MD

## 2022-08-11 ENCOUNTER — HOSPITAL ENCOUNTER (OUTPATIENT)
Age: 52
Discharge: HOME | End: 2022-08-11
Payer: COMMERCIAL

## 2022-08-11 DIAGNOSIS — M25.50 ARTHRALGIA, UNSPECIFIED JOINT: ICD-10-CM

## 2022-08-11 LAB
ERYTHROCYTE SEDIMENTATION RATE: 17 MM/HR (ref 0–30)
HIGH SENSITIVE C-REACTIVE PROTEIN: 5.6 MG/L

## 2022-08-11 PROCEDURE — 86038 ANTINUCLEAR ANTIBODIES: CPT

## 2022-08-11 PROCEDURE — 86200 CCP ANTIBODY: CPT

## 2022-08-11 PROCEDURE — 86141 C-REACTIVE PROTEIN HS: CPT

## 2022-08-11 PROCEDURE — 36415 COLL VENOUS BLD VENIPUNCTURE: CPT

## 2022-08-11 PROCEDURE — 85652 RBC SED RATE AUTOMATED: CPT

## 2022-08-11 PROCEDURE — 86430 RHEUMATOID FACTOR TEST QUAL: CPT

## 2022-08-12 ENCOUNTER — PATIENT MESSAGE (OUTPATIENT)
Dept: INTERNAL MEDICINE CLINIC | Age: 52
End: 2022-08-12

## 2022-08-12 ENCOUNTER — TELEPHONE (OUTPATIENT)
Dept: INTERNAL MEDICINE CLINIC | Age: 52
End: 2022-08-12

## 2022-08-12 NOTE — TELEPHONE ENCOUNTER
Patient called stating she was in office to see Dr. Carina Robertson 08/09. Discussed with her the inflammation and swelling she is experiencing. Naproxen that was prescribed is not helping. Patient is requesting a different medication to help. Pharmacy: [address]

## 2022-08-13 LAB — RHEUMATOID FACTOR: <10 IU/ML (ref 0–14)

## 2022-08-14 LAB
ANTI-NUCLEAR ANTIBODY (ANA): NORMAL
CYCLIC CITRULLINATED PEPTIDE ANTIBODY IGG: 4 UNITS (ref 0–19)

## 2022-08-15 RX ORDER — METHYLPREDNISOLONE 4 MG/1
TABLET ORAL
Qty: 1 KIT | Refills: 0 | Status: SHIPPED | OUTPATIENT
Start: 2022-08-15 | End: 2022-08-21

## 2022-08-15 NOTE — TELEPHONE ENCOUNTER
I've called in steroids for patient but please notify her that this is a temporary treatment and there is a good chance symptoms will recur once she completes medication. We will plan on following up with her in 2 weeks as scheduled and discuss further treatment options at that time. Thanks.

## 2022-08-16 ENCOUNTER — INITIAL CONSULT (OUTPATIENT)
Dept: CARDIOLOGY CLINIC | Age: 52
End: 2022-08-16
Payer: COMMERCIAL

## 2022-08-16 VITALS
SYSTOLIC BLOOD PRESSURE: 138 MMHG | HEIGHT: 61 IN | HEART RATE: 101 BPM | WEIGHT: 249.2 LBS | DIASTOLIC BLOOD PRESSURE: 88 MMHG | BODY MASS INDEX: 47.05 KG/M2

## 2022-08-16 DIAGNOSIS — R07.9 CHEST PAIN, UNSPECIFIED TYPE: Primary | ICD-10-CM

## 2022-08-16 PROCEDURE — 93000 ELECTROCARDIOGRAM COMPLETE: CPT | Performed by: INTERNAL MEDICINE

## 2022-08-16 PROCEDURE — 99204 OFFICE O/P NEW MOD 45 MIN: CPT | Performed by: INTERNAL MEDICINE

## 2022-08-16 NOTE — PROGRESS NOTES
CARDIOLOGY NOTE      8/16/2022    RE: Holly Jean Baptiste  (1970)                               TO:  Dr. Sushil Herbert MD            Nisha Gustafson is a 46 y.o. female who was seen today for management of pain chest                                    HPI:                   Pt has h/o chest pain, morbid obesity, tonsillar malignancy , seen today for chest pain. Pt has having midsternal chest pain rating to the back and into the left shoulder, and down to her left arm for past few weeks  Patient denies any cardiac history  Pt needs knee surgery    Holly Jean Baptiste has the following history recorded in care path:  Patient Active Problem List    Diagnosis Date Noted    Class 3 severe obesity due to excess calories without serious comorbidity with body mass index (BMI) of 45.0 to 49.9 in adult [facility]) 08/09/2022    Prediabetes 08/09/2022    Other hyperlipidemia 01/31/2022    Polyp of colon, adenomatous 05/21/2021    HBO-Osteoradionecrosis of jaw 01/08/2021    Tonsil cancer ([facility] 07/13/2020    Inflamed sebaceous cyst 01/08/2020    Smoker     Anxiety and depression 01/01/2018    Left lumbar radiculopathy 07/14/2016    Osteoarthritis Knee     Acne vulgaris      Current Outpatient Medications   Medication Sig Dispense Refill    methylPREDNISolone (MEDROL DOSEPACK) 4 MG tablet Take by mouth. 1 kit 0    FLUoxetine (PROZAC) 10 MG capsule Take 1 capsule by mouth in the morning. 30 capsule 1     No current facility-administered medications for this visit.      Allergies: Latex and Daypro [oxaprozin]  Past Medical History:   Diagnosis Date    Acne vulgaris     Anxiety and depression 2018    Cancer ([facility] [address] 2020    tonsil cancer    Family history of breast cancer     mother and 5 females in her family with CA breast;    GERD (gastroesophageal reflux disease)     HBO-Osteoradionecrosis of jaw 1/8/2021    Left lumbar radiculopathy 9/2016    Obesity, Class III, BMI 40-49.9 (morbid obesity) ([facility] [address] Osteoarthritis Knee     bilateral osteoarthritis of knees and patellofemoral-followed by Dr Chalino Coleman    Paroxysmal tachycardia unspecified     with heart rate 145; (?AVNRT)    Prediabetes     Smoker      Past Surgical History:   Procedure Laterality Date     SECTION      COLONOSCOPY  2021    three colon polyps    COLONOSCOPY N/A 2021    COLONOSCOPY POLYPECTOMY SNARE/COLD BIOPSY performed by Aurelio Marquis MD at [address]      KNEE ARTHROSCOPY Left     LAPAROSCOPIC APPENDECTOMY  2013    NICOLE AND BSO (CERVIX REMOVED)  2011      As reviewed   Family History   Problem Relation Age of Onset    Diabetes Mother     Cancer Mother         Breast CA    Diabetes Father     Heart Disease Father     Mental Illness Sister     Mental Illness Brother     Mental Illness Sister     Diabetes Brother      Social History     Tobacco Use    Smoking status: Former     Packs/day: 0.25     Years: 33.00     Pack years: 8.25     Types: Cigarettes     Start date: 1985     Quit date: 2020     Years since quittin.1    Smokeless tobacco: Never   Substance Use Topics    Alcohol use: No     Alcohol/week: 0.0 standard drinks     Comment: rare        Objective:    Vitals:    22 1624   BP: 138/88   Pulse: (!) 101   Weight: 249 lb 3.2 oz (113 kg)   Height: 5' 1\" (1.549 m)     /88   Pulse (!) 101   Ht 5' 1\" (1.549 m)   Wt 249 lb 3.2 oz (113 kg)   BMI 47.09 kg/m²     No flowsheet data found. Wt Readings from Last 3 Encounters:   22 249 lb 3.2 oz (113 kg)   22 252 lb (114.3 kg)   22 249 lb (112.9 kg)     Body mass index is 47.09 kg/m². GENERAL - Alert, oriented, pleasant, in no apparent distress. EYES: No jaundice, no conjunctival pallor. SKIN: It is warm & dry. No rashes. No Echhymosis    HEENT - No clinically significant abnormalities seen. Neck - Supple. No jugular venous distention noted. No carotid bruits.    Cardiovascular - Normal Body mass index is 47.09 kg/m². Jordyn Diaz MD    [facility]    Please note this report has been partially produced using speech recognition software and may contain errors related to that system including errors in grammar, punctuation, and spelling, as well as words and phrases that may be inappropriate. If there are any questions or concerns please feel free to contact the dictating provider for clarification.

## 2022-08-17 ENCOUNTER — PROCEDURE VISIT (OUTPATIENT)
Dept: CARDIOLOGY CLINIC | Age: 52
End: 2022-08-17
Payer: COMMERCIAL

## 2022-08-17 VITALS
DIASTOLIC BLOOD PRESSURE: 82 MMHG | BODY MASS INDEX: 47.01 KG/M2 | HEART RATE: 110 BPM | SYSTOLIC BLOOD PRESSURE: 120 MMHG | HEIGHT: 61 IN | WEIGHT: 249 LBS

## 2022-08-17 DIAGNOSIS — R06.02 SOB (SHORTNESS OF BREATH): ICD-10-CM

## 2022-08-17 DIAGNOSIS — R94.31 ABNORMAL EKG: ICD-10-CM

## 2022-08-17 DIAGNOSIS — R07.9 CHEST PAIN, UNSPECIFIED TYPE: Primary | ICD-10-CM

## 2022-08-17 DIAGNOSIS — R00.0 TACHYCARDIA: ICD-10-CM

## 2022-08-17 LAB
LV EF: 58 %
LVEF MODALITY: NORMAL

## 2022-08-17 PROCEDURE — 93306 TTE W/DOPPLER COMPLETE: CPT | Performed by: INTERNAL MEDICINE

## 2022-08-17 PROCEDURE — 93015 CV STRESS TEST SUPVJ I&R: CPT | Performed by: INTERNAL MEDICINE

## 2022-08-30 ENCOUNTER — OFFICE VISIT (OUTPATIENT)
Dept: INTERNAL MEDICINE CLINIC | Age: 52
End: 2022-08-30
Payer: COMMERCIAL

## 2022-08-30 VITALS
WEIGHT: 247 LBS | OXYGEN SATURATION: 98 % | HEART RATE: 97 BPM | DIASTOLIC BLOOD PRESSURE: 70 MMHG | SYSTOLIC BLOOD PRESSURE: 130 MMHG | BODY MASS INDEX: 46.67 KG/M2

## 2022-08-30 DIAGNOSIS — M25.50 ARTHRALGIA, UNSPECIFIED JOINT: Primary | ICD-10-CM

## 2022-08-30 DIAGNOSIS — F32.A ANXIETY AND DEPRESSION: ICD-10-CM

## 2022-08-30 DIAGNOSIS — E66.01 CLASS 3 SEVERE OBESITY DUE TO EXCESS CALORIES WITHOUT SERIOUS COMORBIDITY WITH BODY MASS INDEX (BMI) OF 45.0 TO 49.9 IN ADULT (HCC): ICD-10-CM

## 2022-08-30 DIAGNOSIS — F41.9 ANXIETY AND DEPRESSION: ICD-10-CM

## 2022-08-30 PROCEDURE — 99213 OFFICE O/P EST LOW 20 MIN: CPT | Performed by: FAMILY MEDICINE

## 2022-08-30 RX ORDER — GABAPENTIN 100 MG/1
100 CAPSULE ORAL 3 TIMES DAILY PRN
Qty: 90 CAPSULE | Refills: 1 | Status: SHIPPED | OUTPATIENT
Start: 2022-08-30 | End: 2022-10-31

## 2022-08-30 RX ORDER — FLUOXETINE HYDROCHLORIDE 20 MG/1
20 CAPSULE ORAL DAILY
Qty: 30 CAPSULE | Refills: 1 | Status: SHIPPED | OUTPATIENT
Start: 2022-08-30 | End: 2022-10-31

## 2022-08-30 ASSESSMENT — ENCOUNTER SYMPTOMS
CHEST TIGHTNESS: 0
COLOR CHANGE: 0
DIARRHEA: 0
ABDOMINAL PAIN: 0
VOMITING: 0
CONSTIPATION: 0
SHORTNESS OF BREATH: 0
SORE THROAT: 0

## 2022-08-30 NOTE — PROGRESS NOTES
Subjective:      Chief Complaint   Patient presents with    Follow-up     4 weeks       HPI:  Frida Fontaine is a 46 y.o. female who presents today for follow up of chronic conditions as listed below. Was referred to cardiology at last appointment for chest pain/SOB. Had stress test and ECHO completed. Has follow up in a few weeks. Had screening labs completed for generalized arthralgias. States symptoms nearly resolved with medrol dose pack- completed medication about 10 days ago. Can feel symptoms gradually recurring, but are not as severe as they were. Is still following up with pain management for injections. Was also started on prozac for mood. States she has noticed definite improvement in mood, but feels she could benefit from higher dose. Patient states she just signed up for a 10 week wellness program through work. However, is open to trying weight loss clinic if it is not successful.        Past Medical History:   Diagnosis Date    Acne vulgaris     Anxiety and depression     Cancer ([facility] [address] 2020    tonsil cancer    Family history of breast cancer     mother and 5 females in her family with CA breast;    GERD (gastroesophageal reflux disease)     HBO-Osteoradionecrosis of jaw 2021    Left lumbar radiculopathy 2016    Obesity, Class III, BMI 40-49.9 (morbid obesity) ([facility] [address]     Osteoarthritis Knee     bilateral osteoarthritis of knees and patellofemoral-followed by Dr Asher     Paroxysmal tachycardia unspecified     with heart rate 145; (?AVNRT)    Prediabetes     Smoker         Past Surgical History:   Procedure Laterality Date     SECTION      COLONOSCOPY  2021    three colon polyps    COLONOSCOPY N/A 2021    COLONOSCOPY POLYPECTOMY SNARE/COLD BIOPSY performed by Meghana Abdi MD at [address]      KNEE ARTHROSCOPY Left     LAPAROSCOPIC APPENDECTOMY  2013    NICOLE AND BSO (CERVIX REMOVED)  2011       Social History     Tobacco Use    Smoking status: Former     Packs/day: 0.25     Years: 33.00     Pack years: 8.25     Types: Cigarettes     Start date: 1985     Quit date: 2020     Years since quittin.2    Smokeless tobacco: Never   Substance Use Topics    Alcohol use: No     Alcohol/week: 0.0 standard drinks     Comment: rare        Review of Systems   Constitutional:  Negative for activity change, appetite change, chills, fever and unexpected weight change. HENT:  Negative for congestion and sore throat. Respiratory:  Negative for chest tightness and shortness of breath. Cardiovascular:  Negative for chest pain and palpitations. Gastrointestinal:  Negative for abdominal pain, constipation, diarrhea and vomiting. Genitourinary:  Negative for dysuria. Musculoskeletal:  Positive for arthralgias. Skin:  Negative for color change. Neurological:  Negative for dizziness and light-headedness. Psychiatric/Behavioral:  Negative for dysphoric mood. The patient is not nervous/anxious. Prior to Visit Medications    Medication Sig Taking? Authorizing Provider   FLUoxetine (PROZAC) 10 MG capsule Take 1 capsule by mouth in the morning. Yes Kathleen Garcia MD          Objective:      /70   Pulse 97   Wt 247 lb (112 kg)   SpO2 98%   BMI 46.67 kg/m²      Physical Exam  Vitals and nursing note reviewed. Constitutional:       General: She is not in acute distress. Appearance: Normal appearance. She is obese. She is not ill-appearing or toxic-appearing. HENT:      Head: Normocephalic and atraumatic. Right Ear: External ear normal.      Left Ear: External ear normal.      Nose: Nose normal.      Mouth/Throat:      Pharynx: Oropharynx is clear. Eyes:      General: No scleral icterus. Right eye: No discharge. Left eye: No discharge. Extraocular Movements: Extraocular movements intact.       Conjunctiva/sclera: Conjunctivae normal.   Cardiovascular: Rate and Rhythm: Normal rate and regular rhythm. Heart sounds: Normal heart sounds. Pulmonary:      Effort: Pulmonary effort is normal.      Breath sounds: Normal breath sounds. No wheezing or rales. Musculoskeletal:         General: No deformity. Cervical back: Normal range of motion and neck supple. No rigidity. Skin:     General: Skin is warm and dry. Findings: No rash. Neurological:      General: No focal deficit present. Mental Status: She is alert. Mental status is at baseline. Motor: No weakness. Psychiatric:         Mood and Affect: Mood normal.         Behavior: Behavior normal.          Assessment / Plan:      1. Arthralgia, unspecified joint  Symptoms nearly resolved with medrol dose pack, but now starting to recur. Screening labs essentially negative. Will try low dose gabapentin (can gradually titrate up to TID as needed) for treatment of possible fibromyalgia. Follow up in 2 months or earlier for acute issues. - gabapentin (NEURONTIN) 100 MG capsule; Take 1 capsule by mouth 3 times daily as needed (pain) for up to 180 days. Intended supply: 90 days  Dispense: 90 capsule; Refill: 1    2. Class 3 severe obesity due to excess calories without serious comorbidity with body mass index (BMI) of 45.0 to 49.9 in adult [facility])  Recommended weight loss clinic. Patient states she is participating in wellness program through work and would like to try that first.  If ineffective, would be open to referral.      3. Anxiety and depression  Improving with prozac, patient would like to try increasing dose. Will start 20mg daily and follow up in 2 months.  - FLUoxetine (PROZAC) 20 MG capsule; Take 1 capsule by mouth daily  Dispense: 30 capsule; Refill: 1        I have spent 25 minutes on this patient encounter. Patient voiced understanding and agreement with plan. All questions/concerns were addressed, risks/side effects of medications were reviewed.   Return precautions and after visit summary were provided. Return in about 2 months (around 10/30/2022). or earlier as needed.       Carrie Sanchez MD

## 2022-09-01 ENCOUNTER — TELEPHONE (OUTPATIENT)
Dept: CARDIOLOGY CLINIC | Age: 52
End: 2022-09-01

## 2022-09-01 NOTE — TELEPHONE ENCOUNTER
Called to advise of Echo results - Summary   Limited study due to patients body habitus. Left ventricular function and size is normal, EF is estimated at 55-60%. Moderate left ventricular hypertrophy. Grade I diastolic dysfunction. No regional wall motion abnormalities were detected. No significant valvular abnormalities. No evidence of any pericardial effusion. Also advised of Stress results - Summary    Overall Impression:    Normal exercise performance with angina and ischemic EKG changes.

## 2022-09-16 ENCOUNTER — OFFICE VISIT (OUTPATIENT)
Dept: CARDIOLOGY CLINIC | Age: 52
End: 2022-09-16
Payer: COMMERCIAL

## 2022-09-16 VITALS
HEART RATE: 97 BPM | BODY MASS INDEX: 47.58 KG/M2 | HEIGHT: 61 IN | SYSTOLIC BLOOD PRESSURE: 134 MMHG | DIASTOLIC BLOOD PRESSURE: 80 MMHG | WEIGHT: 252 LBS | OXYGEN SATURATION: 98 %

## 2022-09-16 DIAGNOSIS — R07.9 CHEST PAIN, UNSPECIFIED TYPE: Primary | ICD-10-CM

## 2022-09-16 PROCEDURE — 99213 OFFICE O/P EST LOW 20 MIN: CPT | Performed by: NURSE PRACTITIONER

## 2022-09-16 ASSESSMENT — ENCOUNTER SYMPTOMS
BACK PAIN: 1
ORTHOPNEA: 0
SHORTNESS OF BREATH: 1

## 2022-09-16 NOTE — PROGRESS NOTES
9/16/2022  Primary cardiologist: Dr. Prakash Rear:   Raquel Gonzalez  is an established 46 y.o.  female here for  month follow up on ETT and echo       SUBJECTIVE/OBJECTIVE:  Raquel Gonzalez is a 46 y.o. female with a history of chronic back pain, tachycardia and obesity. HPI :   Raquel Gonzalez reports she continues to have chest pain- anterior chest wall with radiation to neck and jaw. Pain occurs with activity. Is relieved with rest-  Can last for several minutes- she also notes shortness of breath with activity. Review of Systems   Constitutional: Negative for diaphoresis and malaise/fatigue. Cardiovascular:  Positive for chest pain and dyspnea on exertion. Negative for claudication, irregular heartbeat, leg swelling, near-syncope, orthopnea, palpitations and paroxysmal nocturnal dyspnea. Respiratory:  Positive for shortness of breath. Musculoskeletal:  Positive for back pain and joint pain (knee). Neurological:  Negative for dizziness and light-headedness. Psychiatric/Behavioral:  Positive for depression. The patient is nervous/anxious. Vitals:    09/16/22 1318   BP: 134/80   Site: Left Upper Arm   Weight: 252 lb (114.3 kg)   Height: 5' 1\" (1.549 m)     No flowsheet data found. Wt Readings from Last 3 Encounters:   09/16/22 252 lb (114.3 kg)   08/30/22 247 lb (112 kg)   08/17/22 249 lb (112.9 kg)     Body mass index is 47.61 kg/m². Physical Exam  Vitals reviewed. Constitutional:       Appearance: She is obese. HENT:      Head: Normocephalic and atraumatic. Eyes:      Extraocular Movements: Extraocular movements intact. Pupils: Pupils are equal, round, and reactive to light. Neck:      Vascular: No carotid bruit. Cardiovascular:      Rate and Rhythm: Normal rate and regular rhythm. Pulses: Normal pulses. Pulmonary:      Effort: Pulmonary effort is normal.      Breath sounds: Normal breath sounds. No rales. Chest:      Chest wall: No tenderness.    Abdominal:      General: There is no distension. Palpations: Abdomen is soft. Tenderness: There is no abdominal tenderness. Musculoskeletal:      Cervical back: No tenderness. Right lower leg: No edema. Left lower leg: No edema. Skin:     General: Skin is warm and dry. Capillary Refill: Capillary refill takes less than 2 seconds. Neurological:      General: No focal deficit present. Mental Status: She is alert and oriented to person, place, and time. Psychiatric:         Mood and Affect: Mood normal.         Behavior: Behavior normal.              Current Outpatient Medications   Medication Sig Dispense Refill    Meloxicam (MOBIC PO) Take by mouth      FLUoxetine (PROZAC) 20 MG capsule Take 1 capsule by mouth daily 30 capsule 1    gabapentin (NEURONTIN) 100 MG capsule Take 1 capsule by mouth 3 times daily as needed (pain) for up to 180 days. Intended supply: 90 days 90 capsule 1     No current facility-administered medications for this visit. All pertinent data reviewed and discussed with patient  Exercise treadmill stress test 0916/2022  Overall Impression:   Reduced exercise performance without angina and but had ischemic EKG   changes. Schedule lexiscan     Echocardiogram 08/17/2022    Limited study due to patients body habitus. Left ventricular function and size is normal, EF is estimated at 55-60%. Moderate left ventricular hypertrophy. Grade I diastolic dysfunction. No regional wall motion abnormalities were detected. No significant valvular abnormalities. No evidence of any pericardial effusion. ASSESSMENT/PLAN:    Chest pain  Continue have chest pain with radiation to jaw and left arm. Exercise treadmill test showed poor exercise capacity with ischemic EKG changes. Due to ongoing symptoms and EKG changes recommend Lexiscan to further assess chest pain and abnormal EKG.   She not able to walk with treadmill for long periods of time due to her knee   Echocardiogram reviewed with patient. Normal left ventricular systolic function with moderate LVH and grade 1 diastolic appreciated. Recommend good control of blood pressure      Obesity   Bmi 47  Morbidity mortality are increased with morbid obesity  Weight loss is encouraged    Tests ordered: Lexiscan  Follow-up 6 months or sooner if needed    Signed:  JAIME Kunz CNP, 9/16/2022, 1:25 PM    An electronic signature was used to authenticate this note. Please note this report has been partially produced using speech recognition software and may contain errors related to that system including errors in grammar, punctuation, and spelling, as well as words and phrases that may be inappropriate. If there are any questions or concerns please feel free to contact the dictating provider for clarification.

## 2022-09-16 NOTE — PATIENT INSTRUCTIONS
Please be informed that if you contact our office outside of normal business hours the physician on call cannot help with any scheduling or rescheduling issues, procedure instruction questions or any type of medication issue. We advise you for any urgent/emergency that you go to the nearest emergency room! PLEASE CALL OUR OFFICE DURING NORMAL BUSINESS HOURS    Monday - Friday   8 am to 5 pm    [address]: [phone]    [place]:  [phone]      **It is YOUR responsibilty to bring medication bottles and/or updated medication list to [address] [place] [address]. This will allow us to better serve you and all your healthcare needs**    Please hold on to these instructions the  will call you within 1-9 business days when we receive authorization from your insurance. Nuclear Stress Test    WHAT TO EXPECT:   You will need to confirm the test or it could be cancelled. This test will take approximately 2 hours: 1 hour in the AM &    1 hour in the PM. You will be given a time by the Technologist after the first part is completed to come back. You will be given a medication, through an IV in the hand, this will safely simulate exercise. This IV is also needed to inject the radioactive isotope unless you are able toe walk the treadmill. You will receive an injection in the AM & PM before the pictures. Using a special camera, you will have one set of pictures of your heart taken in the AM and a set of pictures in the PM.     PREPARATION FOR TEST:  Eat a light breakfast such as water or juice and toast.  If you are DIABETIC: Eat a normal breakfast with NO CAFFEINE and take your insulin as normal.   AVOID ALL FOODS & DRINKS containing CAFFEINE 12 HOURS PRIOR TO THE TEST: Including coffee, Tea, Ko and other soft drinks even those labeled  caffeine free or decaffeinated. HOLD THESE MEDICATIONS Persantine & Theophylline (Theodur)  24 hours prior & bring your inhaler with you.    The physician will specify if the following Beta blockers need to be held for 24 hours prior to test: N/A

## 2022-09-23 ENCOUNTER — PROCEDURE VISIT (OUTPATIENT)
Dept: CARDIOLOGY CLINIC | Age: 52
End: 2022-09-23
Payer: COMMERCIAL

## 2022-09-23 DIAGNOSIS — R07.9 CHEST PAIN, UNSPECIFIED TYPE: ICD-10-CM

## 2022-09-23 LAB
LV EF: 60 %
LVEF MODALITY: NORMAL

## 2022-09-23 PROCEDURE — A9500 TC99M SESTAMIBI: HCPCS | Performed by: INTERNAL MEDICINE

## 2022-09-23 PROCEDURE — 78452 HT MUSCLE IMAGE SPECT MULT: CPT | Performed by: INTERNAL MEDICINE

## 2022-09-23 PROCEDURE — 93018 CV STRESS TEST I&R ONLY: CPT | Performed by: INTERNAL MEDICINE

## 2022-09-23 PROCEDURE — 93017 CV STRESS TEST TRACING ONLY: CPT | Performed by: INTERNAL MEDICINE

## 2022-09-23 PROCEDURE — 93016 CV STRESS TEST SUPVJ ONLY: CPT | Performed by: INTERNAL MEDICINE

## 2022-09-28 ENCOUNTER — TELEPHONE (OUTPATIENT)
Dept: CARDIOLOGY CLINIC | Age: 52
End: 2022-09-28

## 2022-09-28 NOTE — TELEPHONE ENCOUNTER
9/23/2022 NM     Summary    Supervising physician Dr. Mitra Mauro . Normal tracer uptake in all segments of myocardium on stress ans rest    images. Normal Lexiscan nuclear scintigraphic study suggestive of normal    myocardial perfusion. Gated images demonstrate normal left ventricular    systolic function with EF of 60 %. Recommendation    Continue present medical therapy and followup in office as scheduled.      LM OF TEST BEING \"WNL    ----- Message from JAIME Sommers CNP sent at 9/26/2022  1:57 PM EDT -----  Please phone - normal study and EF

## 2022-10-31 ENCOUNTER — OFFICE VISIT (OUTPATIENT)
Dept: INTERNAL MEDICINE CLINIC | Age: 52
End: 2022-10-31
Payer: COMMERCIAL

## 2022-10-31 VITALS
WEIGHT: 254 LBS | DIASTOLIC BLOOD PRESSURE: 80 MMHG | OXYGEN SATURATION: 99 % | HEIGHT: 61 IN | HEART RATE: 97 BPM | SYSTOLIC BLOOD PRESSURE: 132 MMHG | BODY MASS INDEX: 47.95 KG/M2

## 2022-10-31 DIAGNOSIS — F32.A ANXIETY AND DEPRESSION: ICD-10-CM

## 2022-10-31 DIAGNOSIS — M54.16 LEFT LUMBAR RADICULOPATHY: ICD-10-CM

## 2022-10-31 DIAGNOSIS — R09.89 CHEST CONGESTION: ICD-10-CM

## 2022-10-31 DIAGNOSIS — M25.50 ARTHRALGIA, UNSPECIFIED JOINT: ICD-10-CM

## 2022-10-31 DIAGNOSIS — E66.01 CLASS 3 SEVERE OBESITY DUE TO EXCESS CALORIES WITHOUT SERIOUS COMORBIDITY WITH BODY MASS INDEX (BMI) OF 45.0 TO 49.9 IN ADULT (HCC): ICD-10-CM

## 2022-10-31 DIAGNOSIS — M54.2 NECK PAIN: ICD-10-CM

## 2022-10-31 DIAGNOSIS — F32.A ANXIETY AND DEPRESSION: Primary | ICD-10-CM

## 2022-10-31 DIAGNOSIS — R73.03 PREDIABETES: ICD-10-CM

## 2022-10-31 DIAGNOSIS — E78.49 OTHER HYPERLIPIDEMIA: ICD-10-CM

## 2022-10-31 DIAGNOSIS — F41.9 ANXIETY AND DEPRESSION: Primary | ICD-10-CM

## 2022-10-31 DIAGNOSIS — F41.9 ANXIETY AND DEPRESSION: ICD-10-CM

## 2022-10-31 DIAGNOSIS — C09.9 TONSIL CANCER (HCC): ICD-10-CM

## 2022-10-31 PROCEDURE — 99214 OFFICE O/P EST MOD 30 MIN: CPT | Performed by: FAMILY MEDICINE

## 2022-10-31 RX ORDER — CELECOXIB 100 MG/1
100 CAPSULE ORAL DAILY PRN
Qty: 30 CAPSULE | Refills: 1 | Status: SHIPPED | OUTPATIENT
Start: 2022-10-31 | End: 2022-11-30

## 2022-10-31 RX ORDER — GABAPENTIN 300 MG/1
300 CAPSULE ORAL NIGHTLY
Qty: 90 CAPSULE | Refills: 1 | Status: SHIPPED | OUTPATIENT
Start: 2022-10-31 | End: 2023-04-29

## 2022-10-31 RX ORDER — FLUOXETINE HYDROCHLORIDE 20 MG/1
20 CAPSULE ORAL DAILY
Qty: 90 CAPSULE | Refills: 1 | Status: SHIPPED | OUTPATIENT
Start: 2022-10-31

## 2022-10-31 RX ORDER — GABAPENTIN 100 MG/1
100 CAPSULE ORAL 2 TIMES DAILY
Qty: 180 CAPSULE | Refills: 1 | Status: SHIPPED | OUTPATIENT
Start: 2022-10-31 | End: 2023-04-29

## 2022-10-31 ASSESSMENT — ENCOUNTER SYMPTOMS
SHORTNESS OF BREATH: 0
COLOR CHANGE: 0
CHEST TIGHTNESS: 0
CONSTIPATION: 0
DIARRHEA: 0
SORE THROAT: 1
VOMITING: 0
ABDOMINAL PAIN: 0

## 2022-10-31 NOTE — PROGRESS NOTES
Subjective:      Chief Complaint   Patient presents with    Other     No energy. Feels like weights on chest. Almost like shes going to get a cold. Chest burns when coughs & breathes     Arthritis     Wants a long term medication        HPI:  Flaquito Hernandez is a 46 y.o. female who presents today for follow up of chronic conditions as listed below. Arthralgias:  Was tried on gabapentin at last appointment for possible fibromyalgia. Was previously taking cymbalta, but discontinued due to side effects. Has been taking 100mg in am, 100mg in afternoon and 300mg at night. States it does seem help her neuropathy/sciatica, but not much difference in joint pains. Tried taking 300mg TID, but made her too drowsy. Was tried on mobic by pain management in the past, but had GI upset and discontinued. Is planning on R knee replacement next year. Mood:  Prozac dose was increased to 20mg daily. States she has noticed further improvement with dose change and feels her symptoms are well controlled. States she knows she needs to lose weight and is motivated to make changes. Checked with insurance, but does not cover bariatric surgery. States she feels like she may be coming down with a cold- started having some burning in her chest when she breathes in. Symptoms started today. No other concerns today.     The 10-year ASCVD risk score (Jennifer DK, et al., 2019) is: 1.1%    Values used to calculate the score:      Age: 46 years      Sex: Female      Is Non- : No      Diabetic: No      Tobacco smoker: No      Systolic Blood Pressure: 844 mmHg      Is BP treated: No      HDL Cholesterol: 81 MG/DL      Total Cholesterol: 209 MG/DL       Past Medical History:   Diagnosis Date    Acne vulgaris     Anxiety and depression 2018    Cancer ([address] 2020    tonsil cancer    Family history of breast cancer     mother and 5 females in her family with CA breast;    GERD (gastroesophageal reflux disease)     HBO-Osteoradionecrosis of jaw 2021    Left lumbar radiculopathy 2016    Obesity, Class III, BMI 40-49.9 (morbid obesity) ([facility] [address]     Osteoarthritis Knee     bilateral osteoarthritis of knees and patellofemoral-followed by Dr Sarah Isaac    Paroxysmal tachycardia unspecified     with heart rate 145; (?AVNRT)    Prediabetes     Smoker         Past Surgical History:   Procedure Laterality Date     SECTION      COLONOSCOPY  2021    three colon polyps    COLONOSCOPY N/A 2021    COLONOSCOPY POLYPECTOMY SNARE/COLD BIOPSY performed by John Alonso MD at [place] [address] ARTHROSCOPY Left     LAPAROSCOPIC APPENDECTOMY  2013    NICOLE AND BSO (CERVIX REMOVED)  2011       Social History     Tobacco Use    Smoking status: Former     Packs/day: 0.25     Years: 33.00     Pack years: 8.25     Types: Cigarettes     Start date: 1985     Quit date: 2020     Years since quittin.3    Smokeless tobacco: Never   Substance Use Topics    Alcohol use: No     Alcohol/week: 0.0 standard drinks     Comment: rare        Review of Systems   Constitutional:  Negative for activity change, appetite change, chills, fever and unexpected weight change. HENT:  Positive for sore throat. Negative for congestion. Respiratory:  Negative for chest tightness and shortness of breath. Cardiovascular:  Negative for chest pain and palpitations. Gastrointestinal:  Negative for abdominal pain, constipation, diarrhea and vomiting. Genitourinary:  Negative for dysuria. Musculoskeletal:  Positive for arthralgias and myalgias. Skin:  Negative for color change. Neurological:  Negative for dizziness and light-headedness. Psychiatric/Behavioral:  Negative for dysphoric mood. The patient is not nervous/anxious. Prior to Visit Medications    Medication Sig Taking?  Authorizing Provider   FLUoxetine (PROZAC) 20 MG capsule Take 1 capsule by mouth daily Yes Keagan Colin MD   gabapentin (NEURONTIN) 100 MG capsule Take 1 capsule by mouth 3 times daily as needed (pain) for up to 180 days. Intended supply: 90 days Yes Keagan Colin MD   Meloxicam (MOBIC PO) Take by mouth  Patient not taking: Reported on 10/31/2022  Historical Provider, MD          Objective:      /80 (Site: Right Lower Arm, Position: Sitting, Cuff Size: Large Adult)   Pulse 97   Ht 5' 1\" (1.549 m)   Wt 254 lb (115.2 kg)   SpO2 99%   BMI 47.99 kg/m²      Physical Exam  Vitals and nursing note reviewed. Constitutional:       General: She is not in acute distress. Appearance: Normal appearance. She is obese. She is not ill-appearing or toxic-appearing. HENT:      Head: Normocephalic and atraumatic. Right Ear: External ear normal.      Left Ear: External ear normal.      Nose: Nose normal.      Mouth/Throat:      Pharynx: Oropharynx is clear. Eyes:      General: No scleral icterus. Right eye: No discharge. Left eye: No discharge. Extraocular Movements: Extraocular movements intact. Conjunctiva/sclera: Conjunctivae normal.   Cardiovascular:      Rate and Rhythm: Normal rate and regular rhythm. Heart sounds: Normal heart sounds. Pulmonary:      Effort: Pulmonary effort is normal.      Breath sounds: Normal breath sounds. No wheezing or rales. Musculoskeletal:         General: No deformity. Cervical back: Normal range of motion and neck supple. No rigidity. Skin:     General: Skin is warm and dry. Findings: No rash. Neurological:      General: No focal deficit present. Mental Status: She is alert. Mental status is at baseline. Motor: No weakness. Psychiatric:         Mood and Affect: Mood normal.         Behavior: Behavior normal.          Assessment / Plan:      1. Anxiety and depression  Now well controlled with increased dose of celexa, will continue.      2. Prediabetes  Discussed lifestyle modifications, will monitor.   - CBC with Auto Differential; Future  - Comprehensive Metabolic Panel; Future  - Hemoglobin A1C; Future    3. Other hyperlipidemia  10 year ASCVD risk <10%, encouraged lifestyle modifications. Will monitor labs. - Lipid Panel; Future    4. Arthralgia, unspecified joint  Had extensive discussion regarding cause of symptoms (likely osteoarthritis) and treatment options. Was unable to tolerate mobic- will try celebrex, but instructed to discontinue if she develops GI symptoms. Advised trying high dose tylenol (1000mg) as needed. Can continue current gabapentin regimen. Also discussed benefits of weight loss on arthritis. - celecoxib (CELEBREX) 100 MG capsule; Take 1 capsule by mouth daily as needed for Pain  Dispense: 30 capsule; Refill: 1    5. Class 3 severe obesity due to excess calories without serious comorbidity with body mass index (BMI) of 45.0 to 49.9 in [facility])  Again encouraged weight loss clinic- patient states she will check with insurance to see if it is covered (only checked about bariatric surgery). 6. Tonsil cancer ([facility] [address]  - TSH; Future    7. Chest congestion  Symptoms started today. Advised to monitor for now- contact clinic if not improving next week. I have spent 31 minutes on this patient encounter. Patient voiced understanding and agreement with plan. All questions/concerns were addressed, risks/side effects of medications were reviewed. Return precautions and after visit summary were provided. Return in about 4 months (around 2/28/2023). or earlier as needed.       Alvarez Sorensen MD

## 2022-12-15 ENCOUNTER — OFFICE VISIT (OUTPATIENT)
Dept: INTERNAL MEDICINE CLINIC | Age: 52
End: 2022-12-15
Payer: COMMERCIAL

## 2022-12-15 ENCOUNTER — PATIENT MESSAGE (OUTPATIENT)
Dept: INTERNAL MEDICINE CLINIC | Age: 52
End: 2022-12-15

## 2022-12-15 ENCOUNTER — TELEPHONE (OUTPATIENT)
Dept: INTERNAL MEDICINE CLINIC | Age: 52
End: 2022-12-15

## 2022-12-15 VITALS
HEIGHT: 61 IN | WEIGHT: 249 LBS | BODY MASS INDEX: 47.01 KG/M2 | DIASTOLIC BLOOD PRESSURE: 86 MMHG | SYSTOLIC BLOOD PRESSURE: 130 MMHG | HEART RATE: 96 BPM | OXYGEN SATURATION: 98 %

## 2022-12-15 DIAGNOSIS — R09.89 CHEST CONGESTION: Primary | ICD-10-CM

## 2022-12-15 DIAGNOSIS — R05.9 COUGH, UNSPECIFIED TYPE: ICD-10-CM

## 2022-12-15 LAB
INFLUENZA A ANTIBODY: NEGATIVE
INFLUENZA B ANTIBODY: NEGATIVE
S PYO AG THROAT QL: NORMAL

## 2022-12-15 PROCEDURE — 87804 INFLUENZA ASSAY W/OPTIC: CPT | Performed by: FAMILY MEDICINE

## 2022-12-15 PROCEDURE — 99213 OFFICE O/P EST LOW 20 MIN: CPT | Performed by: FAMILY MEDICINE

## 2022-12-15 PROCEDURE — 87880 STREP A ASSAY W/OPTIC: CPT | Performed by: FAMILY MEDICINE

## 2022-12-15 RX ORDER — ALBUTEROL SULFATE 90 UG/1
2 AEROSOL, METERED RESPIRATORY (INHALATION) 4 TIMES DAILY PRN
Qty: 54 G | Refills: 0 | Status: SHIPPED | OUTPATIENT
Start: 2022-12-15

## 2022-12-15 RX ORDER — AMOXICILLIN AND CLAVULANATE POTASSIUM 875; 125 MG/1; MG/1
1 TABLET, FILM COATED ORAL 2 TIMES DAILY
Qty: 14 TABLET | Refills: 0 | Status: SHIPPED | OUTPATIENT
Start: 2022-12-15 | End: 2022-12-25

## 2022-12-15 RX ORDER — BENZONATATE 100 MG/1
100 CAPSULE ORAL 3 TIMES DAILY PRN
Qty: 30 CAPSULE | Refills: 0 | Status: SHIPPED | OUTPATIENT
Start: 2022-12-15 | End: 2022-12-22

## 2022-12-15 ASSESSMENT — ENCOUNTER SYMPTOMS
COLOR CHANGE: 0
SINUS PRESSURE: 1
DIARRHEA: 0
CONSTIPATION: 0
CHEST TIGHTNESS: 0
VOMITING: 0
ABDOMINAL PAIN: 0
SORE THROAT: 1
SINUS PAIN: 1
SHORTNESS OF BREATH: 0

## 2022-12-15 NOTE — TELEPHONE ENCOUNTER
From: Yanet Hurters  To: Dr. Massimo Urbina: 12/15/2022 12:02 PM EST  Subject: CHEST CONGESTION    Started coughing yesterday and feeling bad. Continuing to get worse today was coughing up yellow and headache. I took a covid test this morning and it was negative. Is there something that can be called in to help with yellow mucus I am coughing up and the congestion?

## 2022-12-15 NOTE — PROGRESS NOTES
Subjective:      Chief Complaint   Patient presents with    Cough     Pt. Here today with complaints of cough and congestion started yesterday. This am did have a fever. Pt. Did take home covid test this morning and was negative. Congestion       HPI:  Marilu Servin is a 46 y.o. female who presents today for URI symptoms. Symptoms started yesterday. Is having severe cough, sinus pressure, headache, sore throat, fever this morning (Tmax 100.8). Has been taking tylenol. Took COVID test this morning which was negative. States  has been having the same symptoms for over a week and is still not improved.          Past Medical History:   Diagnosis Date    Acne vulgaris     Anxiety and depression 2018    Cancer ([address] 2020    tonsil cancer    Family history of breast cancer     mother and 5 females in her family with CA breast;    GERD (gastroesophageal reflux disease)     HBO-Osteoradionecrosis of jaw 2021    Left lumbar radiculopathy 2016    Obesity, Class III, BMI 40-49.9 (morbid obesity) ([facility] [address] [address]     Osteoarthritis Knee     bilateral osteoarthritis of knees and patellofemoral-followed by Dr Johnna Salazar    Paroxysmal tachycardia unspecified     with heart rate 145; (?AVNRT)    Prediabetes     Smoker         Past Surgical History:   Procedure Laterality Date     SECTION      COLONOSCOPY  2021    three colon polyps    COLONOSCOPY N/A 2021    COLONOSCOPY POLYPECTOMY SNARE/COLD BIOPSY performed by Sandrine Baumann MD at [place] [address] ARTHROSCOPY Left     LAPAROSCOPIC APPENDECTOMY  2013    NICOLE AND BSO (CERVIX REMOVED)  2011       Social History     Tobacco Use    Smoking status: Former     Packs/day: 0.25     Years: 33.00     Pack years: 8.25     Types: Cigarettes     Start date: 1985     Quit date: 2020     Years since quittin.4    Smokeless tobacco: Never   Substance Use Topics    Alcohol use: No     Alcohol/week: 0.0 standard drinks     Comment: rare        Review of Systems   Constitutional:  Positive for activity change and fever. Negative for appetite change, chills and unexpected weight change. HENT:  Positive for congestion, ear pain, sinus pressure, sinus pain and sore throat. Respiratory:  Negative for chest tightness and shortness of breath. Cardiovascular:  Negative for chest pain and palpitations. Gastrointestinal:  Negative for abdominal pain, constipation, diarrhea and vomiting. Genitourinary:  Negative for dysuria. Skin:  Negative for color change. Neurological:  Negative for dizziness and light-headedness. Psychiatric/Behavioral:  Negative for dysphoric mood. The patient is not nervous/anxious. Prior to Visit Medications    Medication Sig Taking? Authorizing Provider   gabapentin (NEURONTIN) 300 MG capsule Take 1 capsule by mouth at bedtime for 180 days. Yes Nancy Diallo MD   FLUoxetine (PROZAC) 20 MG capsule Take 1 capsule by mouth daily Yes Nancy Diallo MD   gabapentin (NEURONTIN) 100 MG capsule Take 1 capsule by mouth in the morning and at bedtime for 180 days. Yes Nancy Diallo MD   celecoxib (CELEBREX) 100 MG capsule Take 1 capsule by mouth daily as needed for Pain  Nancy Diallo MD          Objective:      /86   Pulse 96   Ht 5' 1\" (1.549 m)   Wt 249 lb (112.9 kg)   SpO2 98%   BMI 47.05 kg/m²      Physical Exam  Vitals and nursing note reviewed. Constitutional:       General: She is not in acute distress. Appearance: Normal appearance. She is not ill-appearing or toxic-appearing. HENT:      Head: Normocephalic and atraumatic. Right Ear: External ear normal.      Left Ear: External ear normal.      Nose: Nose normal.      Mouth/Throat:      Pharynx: Oropharynx is clear. Eyes:      General: No scleral icterus. Right eye: No discharge. Left eye: No discharge.       Extraocular Movements: Extraocular movements intact. Conjunctiva/sclera: Conjunctivae normal.   Cardiovascular:      Rate and Rhythm: Normal rate and regular rhythm. Heart sounds: Normal heart sounds. Pulmonary:      Effort: Pulmonary effort is normal.      Breath sounds: Normal breath sounds. No wheezing or rales. Musculoskeletal:         General: No deformity. Cervical back: Normal range of motion and neck supple. No rigidity. Skin:     General: Skin is warm and dry. Findings: No rash. Neurological:      General: No focal deficit present. Mental Status: She is alert. Mental status is at baseline. Motor: No weakness. Psychiatric:         Mood and Affect: Mood normal.         Behavior: Behavior normal.          Assessment / Plan:      1. Chest congestion  Rapid strep and flu negative today. Discussed that this may be viral URI, but will provide course of antibiotics to fill if symptoms are still worsening after the weekend. In the meantime, advised supportive care. Contact clinic for any acutely worsening symptoms. - amoxicillin-clavulanate (AUGMENTIN) 875-125 MG per tablet; Take 1 tablet by mouth 2 times daily for 10 days  Dispense: 14 tablet; Refill: 0  - POCT Influenza A/B  - POCT rapid strep A    2. Cough, unspecified type  - albuterol sulfate HFA (VENTOLIN HFA) 108 (90 Base) MCG/ACT inhaler; Inhale 2 puffs into the lungs 4 times daily as needed for Wheezing  Dispense: 54 g; Refill: 0  - benzonatate (TESSALON) 100 MG capsule; Take 1 capsule by mouth 3 times daily as needed for Cough  Dispense: 30 capsule; Refill: 0  - POCT Influenza A/B  - POCT rapid strep A        I have spent 20 minutes on this patient encounter. Patient voiced understanding and agreement with plan. All questions/concerns were addressed, risks/side effects of medications were reviewed. Return precautions and after visit summary were provided. No follow-ups on file. or earlier as needed.       Leigha Grace MD

## 2022-12-15 NOTE — TELEPHONE ENCOUNTER
Patient called with cough and congestion and would like something called into the pharmacy. She did not want to come in. I told her that someone might call her back to have her come in the office.

## 2023-01-25 ENCOUNTER — TELEPHONE (OUTPATIENT)
Dept: CARDIOLOGY CLINIC | Age: 53
End: 2023-01-25

## 2023-01-25 NOTE — TELEPHONE ENCOUNTER
Cardiologist: Dr. Komal Case  Surgeon: Dr. Fernando Casanova  Surgery: Right total knee arthroplasty  Anesthesia: Spinal  Date: TBD  FAX# [phone]    Ph# [phone]    Last OV 9/16/2022 w/Malorie      Chest pain  Continue have chest pain with radiation to jaw and left arm. Exercise treadmill test showed poor exercise capacity with ischemic EKG changes. Due to ongoing symptoms and EKG changes recommend Lexiscan to further assess chest pain and abnormal EKG. She not able to walk with treadmill for long periods of time due to her knee   Echocardiogram reviewed with patient. Normal left ventricular systolic function with moderate LVH and grade 1 diastolic appreciated. Recommend good control of blood pressure        Obesity   Bmi 47  Morbidity mortality are increased with morbid obesity  Weight loss is encouraged         Last EKG- 8/16/2022      NM- 9/23/2022   Normal tracer uptake in all segments of myocardium on stress ans rest    images. Normal Lexiscan nuclear scintigraphic study suggestive of normal    myocardial perfusion. Gated images demonstrate normal left ventricular    systolic function with EF of 60 %. Echo- 8/17/2022   Limited study due to patients body habitus. Left ventricular function and size is normal, EF is estimated at 55-60%. Moderate left ventricular hypertrophy. Grade I diastolic dysfunction. No regional wall motion abnormalities were detected. No significant valvular abnormalities. No evidence of any pericardial effusion.

## 2023-01-27 LAB — MAMMOGRAPHY, EXTERNAL: NORMAL

## 2023-02-16 ENCOUNTER — TELEPHONE (OUTPATIENT)
Dept: CARDIOLOGY CLINIC | Age: 53
End: 2023-02-16

## 2023-02-16 NOTE — TELEPHONE ENCOUNTER
Called and spoke with pt about getting labs drawn before next appt.  Pt stated she is going this weekend to get them drawn,

## 2023-02-22 ENCOUNTER — TELEPHONE (OUTPATIENT)
Dept: INTERNAL MEDICINE CLINIC | Age: 53
End: 2023-02-22

## 2023-02-22 ENCOUNTER — HOSPITAL ENCOUNTER (OUTPATIENT)
Age: 53
Discharge: HOME | End: 2023-02-22
Payer: COMMERCIAL

## 2023-02-22 DIAGNOSIS — R73.03 PREDIABETES: ICD-10-CM

## 2023-02-22 DIAGNOSIS — E78.49 OTHER HYPERLIPIDEMIA: ICD-10-CM

## 2023-02-22 DIAGNOSIS — C09.9 TONSIL CANCER (HCC): ICD-10-CM

## 2023-02-22 LAB
ALBUMIN SERPL-MCNC: 4.4 GM/DL (ref 3.4–5)
ALP BLD-CCNC: 97 IU/L (ref 40–128)
ALT SERPL-CCNC: 75 U/L (ref 10–40)
ANION GAP SERPL CALCULATED.3IONS-SCNC: 9 MMOL/L (ref 4–16)
AST SERPL-CCNC: 52 IU/L (ref 15–37)
BASOPHILS ABSOLUTE: 0 K/CU MM
BASOPHILS RELATIVE PERCENT: 0.7 % (ref 0–1)
BILIRUB SERPL-MCNC: 0.5 MG/DL (ref 0–1)
BUN SERPL-MCNC: 12 MG/DL (ref 6–23)
CALCIUM SERPL-MCNC: 9.3 MG/DL (ref 8.3–10.6)
CHLORIDE BLD-SCNC: 101 MMOL/L (ref 99–110)
CHOLEST SERPL-MCNC: 197 MG/DL
CO2: 27 MMOL/L (ref 21–32)
CREAT SERPL-MCNC: 0.7 MG/DL (ref 0.6–1.1)
DIFFERENTIAL TYPE: ABNORMAL
EOSINOPHILS ABSOLUTE: 0.1 K/CU MM
EOSINOPHILS RELATIVE PERCENT: 2.4 % (ref 0–3)
ESTIMATED AVERAGE GLUCOSE: 134 MG/DL
GFR SERPL CREATININE-BSD FRML MDRD: >60 ML/MIN/1.73M2
GLUCOSE SERPL-MCNC: 109 MG/DL (ref 70–99)
HBA1C MFR BLD: 6.3 % (ref 4.2–6.3)
HCT VFR BLD CALC: 44.1 % (ref 37–47)
HDLC SERPL-MCNC: 64 MG/DL
HEMOGLOBIN: 14.4 GM/DL (ref 12.5–16)
IMMATURE NEUTROPHIL %: 0.5 % (ref 0–0.43)
LDLC SERPL CALC-MCNC: 113 MG/DL
LYMPHOCYTES ABSOLUTE: 1.3 K/CU MM
LYMPHOCYTES RELATIVE PERCENT: 22.9 % (ref 24–44)
MCH RBC QN AUTO: 29.4 PG (ref 27–31)
MCHC RBC AUTO-ENTMCNC: 32.7 % (ref 32–36)
MCV RBC AUTO: 90 FL (ref 78–100)
MONOCYTES ABSOLUTE: 0.5 K/CU MM
MONOCYTES RELATIVE PERCENT: 9 % (ref 0–4)
NUCLEATED RBC %: 0 %
PDW BLD-RTO: 12.7 % (ref 11.7–14.9)
PLATELET # BLD: 232 K/CU MM (ref 140–440)
PMV BLD AUTO: 10.4 FL (ref 7.5–11.1)
POTASSIUM SERPL-SCNC: 4.9 MMOL/L (ref 3.5–5.1)
RBC # BLD: 4.9 M/CU MM (ref 4.2–5.4)
SEGMENTED NEUTROPHILS ABSOLUTE COUNT: 3.5 K/CU MM
SEGMENTED NEUTROPHILS RELATIVE PERCENT: 64.5 % (ref 36–66)
SODIUM BLD-SCNC: 137 MMOL/L (ref 135–145)
TOTAL IMMATURE NEUTOROPHIL: 0.03 K/CU MM
TOTAL NUCLEATED RBC: 0 K/CU MM
TOTAL PROTEIN: 6.6 GM/DL (ref 6.4–8.2)
TRIGL SERPL-MCNC: 102 MG/DL
TSH SERPL DL<=0.005 MIU/L-ACNC: 2 UIU/ML (ref 0.27–4.2)
WBC # BLD: 5.5 K/CU MM (ref 4–10.5)

## 2023-02-22 PROCEDURE — 36415 COLL VENOUS BLD VENIPUNCTURE: CPT

## 2023-02-22 PROCEDURE — 80053 COMPREHEN METABOLIC PANEL: CPT

## 2023-02-22 PROCEDURE — 84443 ASSAY THYROID STIM HORMONE: CPT

## 2023-02-22 PROCEDURE — 80061 LIPID PANEL: CPT

## 2023-02-22 PROCEDURE — 85025 COMPLETE CBC W/AUTO DIFF WBC: CPT

## 2023-02-22 PROCEDURE — 83036 HEMOGLOBIN GLYCOSYLATED A1C: CPT

## 2023-02-22 NOTE — TELEPHONE ENCOUNTER
PROVIDER FEEDBACK LOOP NO SHOW     Patient:Abigail Tellez  : 1970  Referring Provider: Geraldo Lacey  Referral Type:  Eval and Treat     Procedures:  YNP94 - AMB REFERRAL TO CARDIOLOGY  Date Service Ordered 2022        Laureen Ivory did not show for their scheduled test ordered by your office. Please call your patient to explain significance of ordered test and direct patient to call Central Scheduling to schedule test at their earliest convenience. Please complete one of the following actions from Quick Actions buttons:     Route to Provider:  Route message to ordering provider to seek next steps in care plan. Telephone Encounter:  Telephone encounter will open. Call patient to explain significance of ordered test and direct patient to call Central Scheduling to schedule test then Document details of call. Open Referral:  Review referral notes or details if needed. Close Referral:  Referral will open. Document in Notes section of referral why the referral is being closed. Examples of referral closure:  Patient had test done outside of [address] (list location), Patient refuses test, Patient no longer having symptoms, Unable to reach patient. Only close the referral if you are sure the test will not proceed.      Thank you     Central Scheduling

## 2023-02-27 ENCOUNTER — OFFICE VISIT (OUTPATIENT)
Dept: CARDIOLOGY CLINIC | Age: 53
End: 2023-02-27
Payer: COMMERCIAL

## 2023-02-27 VITALS
HEART RATE: 96 BPM | HEIGHT: 62 IN | SYSTOLIC BLOOD PRESSURE: 118 MMHG | OXYGEN SATURATION: 96 % | WEIGHT: 254.8 LBS | DIASTOLIC BLOOD PRESSURE: 78 MMHG | BODY MASS INDEX: 46.89 KG/M2

## 2023-02-27 DIAGNOSIS — E66.01 CLASS 3 SEVERE OBESITY DUE TO EXCESS CALORIES WITHOUT SERIOUS COMORBIDITY WITH BODY MASS INDEX (BMI) OF 45.0 TO 49.9 IN ADULT (HCC): ICD-10-CM

## 2023-02-27 DIAGNOSIS — R00.2 PALPITATIONS: ICD-10-CM

## 2023-02-27 PROCEDURE — 99214 OFFICE O/P EST MOD 30 MIN: CPT | Performed by: NURSE PRACTITIONER

## 2023-02-27 ASSESSMENT — ENCOUNTER SYMPTOMS
ORTHOPNEA: 0
SHORTNESS OF BREATH: 0

## 2023-02-27 NOTE — PROGRESS NOTES
2/27/2023  Primary cardiologist: Dr. Jose Martinez:   Aron Pulido  is an established 46 y.o.  female here for a 6 month follow up on chest pain       SUBJECTIVE/OBJECTIVE:  Aron Pulido is a 46 y.o. female with a history of chest pain, chronic back pain, tachycardia, tonsillar malignancy and obesity. HPI :   Aron Pulido reports overall she is feeling well. She notes occasional episodes of palpitations- brief in nature- lasting few seconds- occur with and without activity. No associated symptoms. She is planning for a knee replacement in March. Review of Systems   Constitutional: Negative for diaphoresis and malaise/fatigue. Cardiovascular:  Positive for palpitations. Negative for chest pain, claudication, dyspnea on exertion, irregular heartbeat, leg swelling, near-syncope, orthopnea and paroxysmal nocturnal dyspnea. Respiratory:  Negative for shortness of breath. Musculoskeletal:  Positive for joint pain (knee). Neurological:  Negative for dizziness and light-headedness. Vitals:    02/27/23 1402   BP: 118/78   Site: Left Upper Arm   Position: Sitting   Cuff Size: Large Adult   Pulse: 96   SpO2: 96%   Weight: 254 lb 12.8 oz (115.6 kg)   Height: 5' 2\" (1.575 m)     No flowsheet data found. Wt Readings from Last 3 Encounters:   02/27/23 254 lb 12.8 oz (115.6 kg)   12/15/22 249 lb (112.9 kg)   10/31/22 254 lb (115.2 kg)     Body mass index is 46.6 kg/m². Physical Exam  Vitals reviewed. Constitutional:       Appearance: She is obese. HENT:      Head: Normocephalic and atraumatic. Eyes:      Extraocular Movements: Extraocular movements intact. Pupils: Pupils are equal, round, and reactive to light. Neck:      Vascular: No carotid bruit. Cardiovascular:      Rate and Rhythm: Normal rate and regular rhythm. Pulses: Normal pulses. Pulmonary:      Effort: Pulmonary effort is normal.      Breath sounds: Normal breath sounds. No rales. Chest:      Chest wall: No tenderness. Abdominal:      General: There is no distension. Palpations: Abdomen is soft. Tenderness: There is no abdominal tenderness. Musculoskeletal:      Cervical back: No tenderness. Right lower leg: No edema. Left lower leg: No edema. Skin:     General: Skin is warm and dry. Capillary Refill: Capillary refill takes less than 2 seconds. Neurological:      General: No focal deficit present. Mental Status: She is alert and oriented to person, place, and time. Psychiatric:         Mood and Affect: Mood normal.         Behavior: Behavior normal.              Current Outpatient Medications   Medication Sig Dispense Refill    CALCIUM-VITAMIN D PO Take by mouth every other day      celecoxib (CELEBREX) 100 MG capsule Take 1 capsule by mouth daily as needed for Pain 30 capsule 1    gabapentin (NEURONTIN) 300 MG capsule Take 1 capsule by mouth at bedtime for 180 days. 90 capsule 1    FLUoxetine (PROZAC) 20 MG capsule Take 1 capsule by mouth daily 90 capsule 1    gabapentin (NEURONTIN) 100 MG capsule Take 1 capsule by mouth in the morning and at bedtime for 180 days. 180 capsule 1     No current facility-administered medications for this visit. All pertinent data reviewed and discussed with patient       ASSESSMENT/PLAN:    Palpitations  Infrequent episodes of palpitations: continue to avoid caffeine and stimulants  If symptoms increase can get 30 day event monitor.      obesity  Bmi 46.6 class 3 obesity   Weight loss encouraged    Pre-Operative Risk assessment using 2014 ACC/AHA guidelines   Emergent procedure No  Active Cardiac Condition No (decompensated HF, Arrhythmia, MI <3 weeks, severe valve disease)  Risk Level of Procedure Intermediate Risk (intraperitoneal, intrathoracic, HENT, orthopedic, or carotid endarterectomy, etc.)  Revised Cardiac Risk Index Risk factors: None  Measurement of Exercise Tolerance before Surgery >4 No    According to the 2014 ACC/AHA pre-operative risk assessment guidelines Lennox Cord is a low risk for major cardiac complications during a intermediate risk procedure and may continue as planned. Specific medication recommendations are listed below. Medications recommended to continue should be taken with a sip of water even when NPO. Further recommendations from consultants: None      Tests ordered:  none  Follow-up  1 year      Signed:  JAIME Johnson CNP, 2/27/2023, 2:09 PM    An electronic signature was used to authenticate this note. Please note this report has been partially produced using speech recognition software and may contain errors related to that system including errors in grammar, punctuation, and spelling, as well as words and phrases that may be inappropriate. If there are any questions or concerns please feel free to contact the dictating provider for clarification.

## 2023-03-06 ENCOUNTER — OFFICE VISIT (OUTPATIENT)
Dept: INTERNAL MEDICINE CLINIC | Age: 53
End: 2023-03-06
Payer: COMMERCIAL

## 2023-03-06 VITALS
HEIGHT: 62 IN | OXYGEN SATURATION: 97 % | BODY MASS INDEX: 46.38 KG/M2 | DIASTOLIC BLOOD PRESSURE: 76 MMHG | SYSTOLIC BLOOD PRESSURE: 124 MMHG | HEART RATE: 106 BPM | WEIGHT: 252 LBS

## 2023-03-06 DIAGNOSIS — R79.89 ELEVATED LFTS: ICD-10-CM

## 2023-03-06 DIAGNOSIS — C09.9 TONSIL CANCER (HCC): ICD-10-CM

## 2023-03-06 DIAGNOSIS — E78.49 OTHER HYPERLIPIDEMIA: ICD-10-CM

## 2023-03-06 DIAGNOSIS — M54.16 LEFT LUMBAR RADICULOPATHY: ICD-10-CM

## 2023-03-06 DIAGNOSIS — F41.9 ANXIETY AND DEPRESSION: ICD-10-CM

## 2023-03-06 DIAGNOSIS — R73.03 PREDIABETES: ICD-10-CM

## 2023-03-06 DIAGNOSIS — R00.0 TACHYCARDIA: ICD-10-CM

## 2023-03-06 DIAGNOSIS — F32.A ANXIETY AND DEPRESSION: ICD-10-CM

## 2023-03-06 DIAGNOSIS — M25.50 ARTHRALGIA, UNSPECIFIED JOINT: Primary | ICD-10-CM

## 2023-03-06 PROCEDURE — 99214 OFFICE O/P EST MOD 30 MIN: CPT | Performed by: FAMILY MEDICINE

## 2023-03-06 RX ORDER — GABAPENTIN 100 MG/1
100 CAPSULE ORAL 2 TIMES DAILY
Qty: 180 CAPSULE | Refills: 1 | Status: CANCELLED | OUTPATIENT
Start: 2023-03-06 | End: 2023-09-02

## 2023-03-06 RX ORDER — GABAPENTIN 300 MG/1
300 CAPSULE ORAL NIGHTLY
Qty: 90 CAPSULE | Refills: 1 | Status: CANCELLED | OUTPATIENT
Start: 2023-03-06 | End: 2023-09-02

## 2023-03-06 RX ORDER — CELECOXIB 100 MG/1
100 CAPSULE ORAL DAILY PRN
Qty: 30 CAPSULE | Refills: 2 | Status: SHIPPED | OUTPATIENT
Start: 2023-03-06 | End: 2023-06-04

## 2023-03-06 RX ORDER — FLUOXETINE HYDROCHLORIDE 20 MG/1
20 CAPSULE ORAL DAILY
Qty: 30 CAPSULE | Refills: 5 | Status: SHIPPED | OUTPATIENT
Start: 2023-03-06

## 2023-03-06 SDOH — ECONOMIC STABILITY: FOOD INSECURITY: WITHIN THE PAST 12 MONTHS, THE FOOD YOU BOUGHT JUST DIDN'T LAST AND YOU DIDN'T HAVE MONEY TO GET MORE.: NEVER TRUE

## 2023-03-06 SDOH — ECONOMIC STABILITY: INCOME INSECURITY: HOW HARD IS IT FOR YOU TO PAY FOR THE VERY BASICS LIKE FOOD, HOUSING, MEDICAL CARE, AND HEATING?: NOT VERY HARD

## 2023-03-06 SDOH — ECONOMIC STABILITY: FOOD INSECURITY: WITHIN THE PAST 12 MONTHS, YOU WORRIED THAT YOUR FOOD WOULD RUN OUT BEFORE YOU GOT MONEY TO BUY MORE.: NEVER TRUE

## 2023-03-06 SDOH — ECONOMIC STABILITY: HOUSING INSECURITY
IN THE LAST 12 MONTHS, WAS THERE A TIME WHEN YOU DID NOT HAVE A STEADY PLACE TO SLEEP OR SLEPT IN A SHELTER (INCLUDING NOW)?: NO

## 2023-03-06 ASSESSMENT — PATIENT HEALTH QUESTIONNAIRE - PHQ9
SUM OF ALL RESPONSES TO PHQ QUESTIONS 1-9: 7
4. FEELING TIRED OR HAVING LITTLE ENERGY: 1
5. POOR APPETITE OR OVEREATING: 0
8. MOVING OR SPEAKING SO SLOWLY THAT OTHER PEOPLE COULD HAVE NOTICED. OR THE OPPOSITE, BEING SO FIGETY OR RESTLESS THAT YOU HAVE BEEN MOVING AROUND A LOT MORE THAN USUAL: 0
2. FEELING DOWN, DEPRESSED OR HOPELESS: 1
7. TROUBLE CONCENTRATING ON THINGS, SUCH AS READING THE NEWSPAPER OR WATCHING TELEVISION: 1
4. FEELING TIRED OR HAVING LITTLE ENERGY: 3
3. TROUBLE FALLING OR STAYING ASLEEP: 1
8. MOVING OR SPEAKING SO SLOWLY THAT OTHER PEOPLE COULD HAVE NOTICED. OR THE OPPOSITE, BEING SO FIGETY OR RESTLESS THAT YOU HAVE BEEN MOVING AROUND A LOT MORE THAN USUAL: 0
7. TROUBLE CONCENTRATING ON THINGS, SUCH AS READING THE NEWSPAPER OR WATCHING TELEVISION: 1
6. FEELING BAD ABOUT YOURSELF - OR THAT YOU ARE A FAILURE OR HAVE LET YOURSELF OR YOUR FAMILY DOWN: 0
SUM OF ALL RESPONSES TO PHQ QUESTIONS 1-9: 7
3. TROUBLE FALLING OR STAYING ASLEEP: 3
SUM OF ALL RESPONSES TO PHQ9 QUESTIONS 1 & 2: 2
SUM OF ALL RESPONSES TO PHQ QUESTIONS 1-9: 7
SUM OF ALL RESPONSES TO PHQ QUESTIONS 1-9: 7
2. FEELING DOWN, DEPRESSED OR HOPELESS: 1
5. POOR APPETITE OR OVEREATING: 0
SUM OF ALL RESPONSES TO PHQ QUESTIONS 1-9: 7
10. IF YOU CHECKED OFF ANY PROBLEMS, HOW DIFFICULT HAVE THESE PROBLEMS MADE IT FOR YOU TO DO YOUR WORK, TAKE CARE OF THINGS AT HOME, OR GET ALONG WITH OTHER PEOPLE: 1
SUM OF ALL RESPONSES TO PHQ QUESTIONS 1-9: 7
1. LITTLE INTEREST OR PLEASURE IN DOING THINGS: 1
SUM OF ALL RESPONSES TO PHQ QUESTIONS 1-9: 7
10. IF YOU CHECKED OFF ANY PROBLEMS, HOW DIFFICULT HAVE THESE PROBLEMS MADE IT FOR YOU TO DO YOUR WORK, TAKE CARE OF THINGS AT HOME, OR GET ALONG WITH OTHER PEOPLE: 1
1. LITTLE INTEREST OR PLEASURE IN DOING THINGS: 1
SUM OF ALL RESPONSES TO PHQ QUESTIONS 1-9: 7
9. THOUGHTS THAT YOU WOULD BE BETTER OFF DEAD, OR OF HURTING YOURSELF: 0
6. FEELING BAD ABOUT YOURSELF - OR THAT YOU ARE A FAILURE OR HAVE LET YOURSELF OR YOUR FAMILY DOWN: 0
SUM OF ALL RESPONSES TO PHQ9 QUESTIONS 1 & 2: 2

## 2023-03-06 ASSESSMENT — ENCOUNTER SYMPTOMS
SHORTNESS OF BREATH: 0
SINUS PAIN: 1
DIARRHEA: 0
CONSTIPATION: 0
SINUS PRESSURE: 1
VOMITING: 0
ABDOMINAL PAIN: 0
COLOR CHANGE: 0
SORE THROAT: 0
CHEST TIGHTNESS: 0

## 2023-03-06 NOTE — PROGRESS NOTES
Subjective:      Chief Complaint   Patient presents with    Follow-up     4 month follow up - knee surgery on      Headache    Sinus Problem     Started last night - worse today. HPI:  Colt Hernandez is a 46 y.o. female who presents today for follow up of chronic conditions as listed below. Is scheduled for R knee replacement on 3/20 with Dr. Mayra Mccarthy, has already had cardiac clearance and labwork at [facility]. Is also planning on L knee replacement as well after she recovers from her first surgery. Elevated LFTs:  States she has history of LFT's in the past- had liver US and was told she has fatty liver. Has gradually been increasing her gabapentin dose, now taking 200mg in am, 200mg in afternoon and 500mg in evening. States she cannot take a higher dose as it makes her too drowsy. Takes celebrex as needed, tries to minimize. Is requesting more extensive thyroid labs with next lab order. Has had sinus congestion since last night. Otherwise has been doing well, no other concerns today.         Past Medical History:   Diagnosis Date    Acne vulgaris     Anxiety and depression 2018    Cancer ([address] 2020    tonsil cancer    Family history of breast cancer     mother and 5 females in her family with CA breast;    GERD (gastroesophageal reflux disease)     HBO-Osteoradionecrosis of jaw 2021    Left lumbar radiculopathy 2016    Obesity, Class III, BMI 40-49.9 (morbid obesity) ([address]     Osteoarthritis Knee     bilateral osteoarthritis of knees and patellofemoral-followed by Dr Odessa Kraus    Paroxysmal tachycardia unspecified     with heart rate 145; (?AVNRT)    Prediabetes     Smoker         Past Surgical History:   Procedure Laterality Date     SECTION      COLONOSCOPY  2021    three colon polyps    COLONOSCOPY N/A 2021    COLONOSCOPY POLYPECTOMY SNARE/COLD BIOPSY performed by Alexandr Mari MD at [address]   KNEE ARTHROSCOPY Left     LAPAROSCOPIC APPENDECTOMY  2013    NICOLE AND BSO (CERVIX REMOVED)  2011       Social History     Tobacco Use    Smoking status: Former     Packs/day: 0.25     Years: 33.00     Pack years: 8.25     Types: Cigarettes     Start date: 1985     Quit date: 2020     Years since quittin.7    Smokeless tobacco: Never   Substance Use Topics    Alcohol use: No     Comment: rare. caffeine: 1 cup coffee daily        Review of Systems   Constitutional:  Negative for activity change, appetite change, chills, fever and unexpected weight change. HENT:  Positive for congestion, sinus pressure and sinus pain. Negative for sore throat. Respiratory:  Negative for chest tightness and shortness of breath. Cardiovascular:  Negative for chest pain and palpitations. Gastrointestinal:  Negative for abdominal pain, constipation, diarrhea and vomiting. Genitourinary:  Negative for dysuria. Skin:  Negative for color change. Neurological:  Negative for dizziness and light-headedness. Psychiatric/Behavioral:  Negative for dysphoric mood. The patient is not nervous/anxious. Prior to Visit Medications    Medication Sig Taking? Authorizing Provider   CALCIUM-VITAMIN D PO Take by mouth every other day Yes Historical Provider, MD   gabapentin (NEURONTIN) 300 MG capsule Take 1 capsule by mouth at bedtime for 180 days. Yes Wilbert Mei MD   FLUoxetine (PROZAC) 20 MG capsule Take 1 capsule by mouth daily Yes Wilbert Mei MD   gabapentin (NEURONTIN) 100 MG capsule Take 1 capsule by mouth in the morning and at bedtime for 180 days.  Yes Wilbert Mei MD   celecoxib (CELEBREX) 100 MG capsule Take 1 capsule by mouth daily as needed for Pain  Wilbert Mei MD          Objective:      /76 (Site: Left Lower Arm, Position: Sitting, Cuff Size: Medium Adult)   Pulse (!) 106   Ht 5' 2\" (1.575 m)   Wt 252 lb (114.3 kg)   SpO2 97%   BMI 46.09 kg/m²      Physical Exam  Vitals and nursing note reviewed. Constitutional:       General: She is not in acute distress. Appearance: Normal appearance. She is obese. She is not ill-appearing or toxic-appearing. HENT:      Head: Normocephalic and atraumatic. Right Ear: External ear normal.      Left Ear: External ear normal.      Mouth/Throat:      Pharynx: Oropharynx is clear. Eyes:      General: No scleral icterus. Right eye: No discharge. Left eye: No discharge. Extraocular Movements: Extraocular movements intact. Conjunctiva/sclera: Conjunctivae normal.   Cardiovascular:      Rate and Rhythm: Normal rate and regular rhythm. Heart sounds: Normal heart sounds. Pulmonary:      Effort: Pulmonary effort is normal.      Breath sounds: Normal breath sounds. No wheezing or rales. Musculoskeletal:         General: No deformity. Cervical back: Normal range of motion and neck supple. No rigidity. Skin:     General: Skin is warm and dry. Findings: No rash. Neurological:      General: No focal deficit present. Mental Status: She is alert. Mental status is at baseline. Motor: No weakness. Psychiatric:         Mood and Affect: Mood normal.         Behavior: Behavior normal.          Assessment / Plan:      1. Arthralgia, unspecified joint  May continue celebrex as needed. - celecoxib (CELEBREX) 100 MG capsule; Take 1 capsule by mouth daily as needed for Pain  Dispense: 30 capsule; Refill: 2    2. Anxiety and depression  Stable, well controlled. Continue current medications.   - FLUoxetine (PROZAC) 20 MG capsule; Take 1 capsule by mouth daily  Dispense: 30 capsule; Refill: 5    3. Left lumbar radiculopathy  May continue gabapentin- is hoping she will be able to decrease dose after she has her knee replacement. States she will contact clinic post operatively and update us on dose- will refill at that time. 4. Prediabetes  Slight improvement in labs.   Encouraged weight loss, will monitor.   - CBC with Auto Differential; Future  - Comprehensive Metabolic Panel; Future  - Hemoglobin A1C; Future    5. Other hyperlipidemia  Lipid panel stable, will monitor.   - Lipid Panel; Future    6. Tonsil cancer [address] [address]  TSH wnl, patient requesting T3 and T4 to be checked. - TSH; Future  - T3; Future  - T4, Free; Future    7. Elevated LFTs  Suspect 2/2 fatty liver. However, patient has had tonsil cancer since last liver imaging. Patient declining further evaluation at this time, but if LFTs still abnormal/worsening on next labs, will check liver US. Also emphasized importance of weight loss. - Comprehensive Metabolic Panel; Future    8. Tachycardia  Chronic, patient does follow with cardiology- states she has already had evaluation. Will monitor for now, but if still tachycardic at follow up, would consider starting beta blocker as patient is sometimes symptomatic. Patient agreeable. I have spent 30 minutes on this patient encounter. Patient voiced understanding and agreement with plan. All questions/concerns were addressed, risks/side effects of medications were reviewed. Return precautions and after visit summary were provided. Return in about 6 months (around 9/6/2023). or earlier as needed.       Adiel Carlton MD

## 2023-03-29 ENCOUNTER — APPOINTMENT (OUTPATIENT)
Dept: ULTRASOUND IMAGING | Age: 53
End: 2023-03-29
Payer: COMMERCIAL

## 2023-03-29 ENCOUNTER — HOSPITAL ENCOUNTER (EMERGENCY)
Age: 53
Discharge: HOME | End: 2023-03-29
Attending: EMERGENCY MEDICINE
Payer: COMMERCIAL

## 2023-03-29 VITALS
BODY MASS INDEX: 45.73 KG/M2 | WEIGHT: 250 LBS | OXYGEN SATURATION: 97 % | SYSTOLIC BLOOD PRESSURE: 158 MMHG | RESPIRATION RATE: 14 BRPM | TEMPERATURE: 97.9 F | HEART RATE: 85 BPM | DIASTOLIC BLOOD PRESSURE: 90 MMHG

## 2023-03-29 DIAGNOSIS — M79.604 RIGHT LEG PAIN: Primary | ICD-10-CM

## 2023-03-29 PROCEDURE — 93971 EXTREMITY STUDY: CPT

## 2023-03-29 PROCEDURE — 99284 EMERGENCY DEPT VISIT MOD MDM: CPT

## 2023-03-29 NOTE — ED PROVIDER NOTES
Take 1 capsule by mouth daily as needed for Pain 30 capsule 2    FLUoxetine (PROZAC) 20 MG capsule Take 1 capsule by mouth daily 30 capsule 5    CALCIUM-VITAMIN D PO Take by mouth every other day      gabapentin (NEURONTIN) 300 MG capsule Take 1 capsule by mouth at bedtime for 180 days. 90 capsule 1    gabapentin (NEURONTIN) 100 MG capsule Take 1 capsule by mouth in the morning and at bedtime for 180 days. 180 capsule 1     Allergies   Allergen Reactions    Latex     Daypro [Oxaprozin]      Causes burns to vaginal area       Nursing Notes Reviewed    Physical Exam:  ED Triage Vitals   Enc Vitals Group      BP 03/29/23 1257 (!) 173/100      Heart Rate 03/29/23 1257 95      Resp 03/29/23 1257 14      Temp 03/29/23 1257 97.9 °F (36.6 °C)      Temp src --       SpO2 03/29/23 1257 98 %      Weight 03/29/23 1259 250 lb (113.4 kg)      Height --       Head Circumference --       Peak Flow --       Pain Score --       Pain Loc --       Pain Edu? --       Excl. in [address]? --      GENERAL APPEARANCE: Awake and alert. Cooperative. No acute distress. HEAD: Normocephalic. Atraumatic. EYES: EOM's grossly intact. Sclera anicteric. ENT: Tolerates saliva. No trismus. NECK: Supple. Trachea midline. CARDIO: RRR. Radial pulse 2+. LUNGS: Respirations unlabored. CTAB. ABDOMEN: Soft. Non-distended. Non-tender. EXTREMITIES: Patient does have some mild edema of the right calf compared to the left. Symmetric lower extremity pulses. Compartments are soft. No focal areas of warmth, induration or breakdown to suggest cellulitis. SKIN: Warm and dry. NEUROLOGICAL: No gross facial drooping. Moves all 4 extremities spontaneously. Symmetric strength and intact sensation bilateral lower extremities. PSYCHIATRIC: Normal mood.      Radiographs (if obtained):  [] The following radiograph was interpreted by myself in the absence of a radiologist:  [x] Radiologist's Report reviewed at time of ED visit:  VL DUP LOWER EXTREMITY VENOUS 22577  [phone]    If symptoms worsen    Discharge medications:  Discharge Medication List as of 3/29/2023  4:04 PM        (Please note that portions of this note may have been completed with a voice recognition program. Efforts were made to edit the dictations but occasionally words are mis-transcribed.)    Karl Mejía, DO Karl Mejía DO  04/02/23 0695

## 2023-03-29 NOTE — ED NOTES
Patient given discharge instructions at this time. Verbalizes understanding and denies any further needs, questions, concerns or complaints. Patient ambulatory to front of ER in standing, up position in stable condition.       Elsie Snellen, RN  03/29/23 [id number]

## 2023-07-10 DIAGNOSIS — M25.50 ARTHRALGIA, UNSPECIFIED JOINT: ICD-10-CM

## 2023-07-10 RX ORDER — CELECOXIB 100 MG/1
CAPSULE ORAL
Qty: 30 CAPSULE | Refills: 0 | Status: SHIPPED | OUTPATIENT
Start: 2023-07-10

## 2023-08-04 LAB — MAMMOGRAPHY, EXTERNAL: NORMAL

## 2023-09-09 ENCOUNTER — HOSPITAL ENCOUNTER (OUTPATIENT)
Age: 53
Discharge: HOME | End: 2023-09-09
Payer: COMMERCIAL

## 2023-09-09 DIAGNOSIS — C09.9 TONSIL CANCER (HCC): ICD-10-CM

## 2023-09-09 DIAGNOSIS — R79.89 ELEVATED LFTS: ICD-10-CM

## 2023-09-09 DIAGNOSIS — E78.49 OTHER HYPERLIPIDEMIA: ICD-10-CM

## 2023-09-09 DIAGNOSIS — R73.03 PREDIABETES: ICD-10-CM

## 2023-09-09 LAB
ALBUMIN SERPL-MCNC: 4 GM/DL (ref 3.4–5)
ALP BLD-CCNC: 92 IU/L (ref 40–128)
ALT SERPL-CCNC: 41 U/L (ref 10–40)
ANION GAP SERPL CALCULATED.3IONS-SCNC: 12 MMOL/L (ref 4–16)
AST SERPL-CCNC: 25 IU/L (ref 15–37)
BASOPHILS ABSOLUTE: 0 K/CU MM
BASOPHILS RELATIVE PERCENT: 0.6 % (ref 0–1)
BILIRUB SERPL-MCNC: 0.4 MG/DL (ref 0–1)
BUN SERPL-MCNC: 12 MG/DL (ref 6–23)
CALCIUM SERPL-MCNC: 9.1 MG/DL (ref 8.3–10.6)
CHLORIDE BLD-SCNC: 104 MMOL/L (ref 99–110)
CHOLEST SERPL-MCNC: 181 MG/DL
CO2: 24 MMOL/L (ref 21–32)
CREAT SERPL-MCNC: 0.7 MG/DL (ref 0.6–1.1)
DIFFERENTIAL TYPE: ABNORMAL
EOSINOPHILS ABSOLUTE: 0.1 K/CU MM
EOSINOPHILS RELATIVE PERCENT: 2.4 % (ref 0–3)
ESTIMATED AVERAGE GLUCOSE: 143 MG/DL
GFR SERPL CREATININE-BSD FRML MDRD: >60 ML/MIN/1.73M2
GLUCOSE SERPL-MCNC: 125 MG/DL (ref 70–99)
HBA1C MFR BLD: 6.6 % (ref 4.2–6.3)
HCT VFR BLD CALC: 44.7 % (ref 37–47)
HDLC SERPL-MCNC: 55 MG/DL
HEMOGLOBIN: 13.8 GM/DL (ref 12.5–16)
IMMATURE NEUTROPHIL %: 0.6 % (ref 0–0.43)
LDLC SERPL CALC-MCNC: 104 MG/DL
LYMPHOCYTES ABSOLUTE: 1.1 K/CU MM
LYMPHOCYTES RELATIVE PERCENT: 22.1 % (ref 24–44)
MCH RBC QN AUTO: 29.1 PG (ref 27–31)
MCHC RBC AUTO-ENTMCNC: 30.9 % (ref 32–36)
MCV RBC AUTO: 94.1 FL (ref 78–100)
MONOCYTES ABSOLUTE: 0.5 K/CU MM
MONOCYTES RELATIVE PERCENT: 9.3 % (ref 0–4)
NUCLEATED RBC %: 0 %
PDW BLD-RTO: 13 % (ref 11.7–14.9)
PLATELET # BLD: 214 K/CU MM (ref 140–440)
PMV BLD AUTO: 10.4 FL (ref 7.5–11.1)
POTASSIUM SERPL-SCNC: 4.8 MMOL/L (ref 3.5–5.1)
RBC # BLD: 4.75 M/CU MM (ref 4.2–5.4)
SEGMENTED NEUTROPHILS ABSOLUTE COUNT: 3.3 K/CU MM
SEGMENTED NEUTROPHILS RELATIVE PERCENT: 65 % (ref 36–66)
SODIUM BLD-SCNC: 140 MMOL/L (ref 135–145)
T4 FREE SERPL-MCNC: 0.92 NG/DL (ref 0.9–1.8)
TOTAL IMMATURE NEUTOROPHIL: 0.03 K/CU MM
TOTAL NUCLEATED RBC: 0 K/CU MM
TOTAL PROTEIN: 6.2 GM/DL (ref 6.4–8.2)
TRIGL SERPL-MCNC: 108 MG/DL
TSH SERPL DL<=0.005 MIU/L-ACNC: 1.21 UIU/ML (ref 0.27–4.2)
WBC # BLD: 5.1 K/CU MM (ref 4–10.5)

## 2023-09-09 PROCEDURE — 84439 ASSAY OF FREE THYROXINE: CPT

## 2023-09-09 PROCEDURE — 83036 HEMOGLOBIN GLYCOSYLATED A1C: CPT

## 2023-09-09 PROCEDURE — 36415 COLL VENOUS BLD VENIPUNCTURE: CPT

## 2023-09-09 PROCEDURE — 84480 ASSAY TRIIODOTHYRONINE (T3): CPT

## 2023-09-09 PROCEDURE — 85025 COMPLETE CBC W/AUTO DIFF WBC: CPT

## 2023-09-09 PROCEDURE — 80053 COMPREHEN METABOLIC PANEL: CPT

## 2023-09-09 PROCEDURE — 80061 LIPID PANEL: CPT

## 2023-09-09 PROCEDURE — 84443 ASSAY THYROID STIM HORMONE: CPT

## 2023-09-12 LAB — T3 SERPL-MCNC: 131 NG/DL (ref 80–200)

## 2023-09-14 ENCOUNTER — OFFICE VISIT (OUTPATIENT)
Dept: INTERNAL MEDICINE CLINIC | Age: 53
End: 2023-09-14
Payer: COMMERCIAL

## 2023-09-14 VITALS
DIASTOLIC BLOOD PRESSURE: 88 MMHG | SYSTOLIC BLOOD PRESSURE: 122 MMHG | BODY MASS INDEX: 46.93 KG/M2 | HEIGHT: 62 IN | OXYGEN SATURATION: 97 % | HEART RATE: 89 BPM | WEIGHT: 255 LBS

## 2023-09-14 DIAGNOSIS — M25.50 ARTHRALGIA, UNSPECIFIED JOINT: ICD-10-CM

## 2023-09-14 DIAGNOSIS — F41.9 ANXIETY AND DEPRESSION: ICD-10-CM

## 2023-09-14 DIAGNOSIS — E78.49 OTHER HYPERLIPIDEMIA: ICD-10-CM

## 2023-09-14 DIAGNOSIS — L81.9 HYPERPIGMENTATION: ICD-10-CM

## 2023-09-14 DIAGNOSIS — R73.03 PREDIABETES: ICD-10-CM

## 2023-09-14 DIAGNOSIS — F32.A ANXIETY AND DEPRESSION: ICD-10-CM

## 2023-09-14 DIAGNOSIS — R53.83 OTHER FATIGUE: Primary | ICD-10-CM

## 2023-09-14 PROCEDURE — 99215 OFFICE O/P EST HI 40 MIN: CPT | Performed by: FAMILY MEDICINE

## 2023-09-14 RX ORDER — CELECOXIB 100 MG/1
CAPSULE ORAL
Qty: 30 CAPSULE | Refills: 1 | Status: SHIPPED | OUTPATIENT
Start: 2023-09-14

## 2023-09-14 RX ORDER — FLUOXETINE HYDROCHLORIDE 20 MG/1
20 CAPSULE ORAL DAILY
Qty: 90 CAPSULE | Refills: 1 | Status: SHIPPED | OUTPATIENT
Start: 2023-09-14

## 2023-09-14 RX ORDER — VIT C/B6/B5/MAGNESIUM/HERB 173 50-5-6-5MG
1500 CAPSULE ORAL DAILY
COMMUNITY

## 2023-09-14 NOTE — PATIENT INSTRUCTIONS
We are committed to providing you the best care possible. If you receive a survey after visiting one of our offices, please take time to share your experience concerning your physician office visit. These surveys are confidential and no health information about you is shared. We are eager to improve for you and we are counting on your feedback to help make that happen. Start checking your blood pressures at home. If they are >140/90, contact clinic.

## 2023-09-14 NOTE — PROGRESS NOTES
FOR PAIN  Dispense: 30 capsule; Refill: 1    3. Hyperpigmentation  Patient also requiring annual skin exam due to history of cancer. Will refer to dermatology. - Amb External Referral To Dermatology    4. Anxiety and depression  Stable, well controlled. Continue current medications.   - FLUoxetine (PROZAC) 20 MG capsule; Take 1 capsule by mouth daily  Dispense: 90 capsule; Refill: 1    5. Prediabetes  Will screen for DM.   - CBC with Auto Differential; Future  - Comprehensive Metabolic Panel; Future  - Hemoglobin A1C; Future    6. Other hyperlipidemia  Encouraged lifestyle modifications, will continue to monitor.  - Lipid Panel; Future            I have spent 40 minutes on this patient encounter. Patient voiced understanding and agreement with plan. All questions/concerns were addressed, risks/side effects of medications were reviewed. Return precautions and after visit summary were provided. Return in about 6 months (around 3/14/2024). or earlier as needed.       Dimas Purcell MD

## 2023-09-18 ASSESSMENT — ENCOUNTER SYMPTOMS
VOMITING: 0
COLOR CHANGE: 1
SHORTNESS OF BREATH: 0
CONSTIPATION: 0
SORE THROAT: 0
CHEST TIGHTNESS: 0
ABDOMINAL PAIN: 0
DIARRHEA: 0

## 2023-10-19 ENCOUNTER — HOSPITAL ENCOUNTER (OUTPATIENT)
Dept: SLEEP CENTER | Age: 53
Discharge: HOME | End: 2023-10-19
Payer: COMMERCIAL

## 2023-10-19 VITALS
BODY MASS INDEX: 48.15 KG/M2 | SYSTOLIC BLOOD PRESSURE: 132 MMHG | HEART RATE: 83 BPM | HEIGHT: 61 IN | OXYGEN SATURATION: 97 % | WEIGHT: 255 LBS | DIASTOLIC BLOOD PRESSURE: 76 MMHG

## 2023-10-19 DIAGNOSIS — G47.33 OSA (OBSTRUCTIVE SLEEP APNEA): ICD-10-CM

## 2023-10-19 DIAGNOSIS — E66.01 MORBID OBESITY WITH BMI OF 45.0-49.9, ADULT (HCC): ICD-10-CM

## 2023-10-19 DIAGNOSIS — G47.10 HYPERSOMNIA: ICD-10-CM

## 2023-10-19 DIAGNOSIS — Z87.891 EX-CIGARETTE SMOKER: ICD-10-CM

## 2023-10-19 PROCEDURE — 99204 OFFICE O/P NEW MOD 45 MIN: CPT | Performed by: INTERNAL MEDICINE

## 2023-10-19 PROCEDURE — 99211 OFF/OP EST MAY X REQ PHY/QHP: CPT

## 2023-10-19 ASSESSMENT — SLEEP AND FATIGUE QUESTIONNAIRES
HOW LIKELY ARE YOU TO NOD OFF OR FALL ASLEEP WHILE WATCHING TV: 2
HOW LIKELY ARE YOU TO NOD OFF OR FALL ASLEEP IN A CAR, WHILE STOPPED FOR A FEW MINUTES IN TRAFFIC: 0
NECK CIRCUMFERENCE (INCHES): 16.5
HOW LIKELY ARE YOU TO NOD OFF OR FALL ASLEEP WHEN YOU ARE A PASSENGER IN A CAR FOR AN HOUR WITHOUT A BREAK: 2
HOW LIKELY ARE YOU TO NOD OFF OR FALL ASLEEP WHILE SITTING AND TALKING TO SOMEONE: 0
ESS TOTAL SCORE: 11
HOW LIKELY ARE YOU TO NOD OFF OR FALL ASLEEP WHILE LYING DOWN TO REST IN THE AFTERNOON WHEN CIRCUMSTANCES PERMIT: 2
HOW LIKELY ARE YOU TO NOD OFF OR FALL ASLEEP WHILE SITTING INACTIVE IN A PUBLIC PLACE: 1
HOW LIKELY ARE YOU TO NOD OFF OR FALL ASLEEP WHILE SITTING AND READING: 3
HOW LIKELY ARE YOU TO NOD OFF OR FALL ASLEEP WHILE SITTING QUIETLY AFTER LUNCH WITHOUT ALCOHOL: 1

## 2023-10-19 NOTE — CONSULTS
Mary SHELBY, Adrianne Oconnell MD, [address] [facility] MD, [address] [place] [address] DO       W. Candida Hernandez. [address] [facility], [address] [facility]   PH: [phone]  F: [phone]     Subjective:     Patient ID: Jazzmine Leonard is a 48 y.o. female, referred to the sleep center for   Chief Complaint   Patient presents with    Fatigue   .     Referring physician:  Theodora Hernandez MD     History:has been referred for the RUBENS    Symptoms:   [x]  Snoring                                                                [x]  Dry Mouth  []  Choking                                                               [x]  Morning Headaches  []  Gasping for Air                                                   []  Trouble Falling asleep  [x]  Tired during the daytime                                    []  Trouble Staying Asleep  [x]  Tired when you wake up                                    [x]  Weight Gain in Last 5 Years  [x]  Wake up frequently at night                                []  Weight Loss in Last 5 Years  []  Shortness Of Breath                                            []  Shift Worker  []  Coughing                                                             [x]  Smoker (Previous or Current) 1/2 pk/day x 25 yrs quit 2020  []  Chest Pain                                                         [x]  Anxiety  []  Trouble keeping your legs still at night                 [x]  Depression  []  Kicking your legs in your sleep                            []  Insomnia     []  Palpitation  []  Stop breathing      []  Other:     Significant Co-morbidities:  []  Congestive Heart Failure     []  COPD         []  Stroke (Past 30 Days)      []  Supplemental Oxygen Usage       []  Cognitive Impairment      []  Neuromuscular Problems  []  Epilepsy/Neurological Disorders           Social History     Socioeconomic History    Marital status:

## 2023-11-02 ENCOUNTER — HOSPITAL ENCOUNTER (OUTPATIENT)
Dept: SLEEP CENTER | Age: 53
Discharge: HOME | End: 2023-11-02
Payer: COMMERCIAL

## 2023-11-02 DIAGNOSIS — G47.33 OSA (OBSTRUCTIVE SLEEP APNEA): ICD-10-CM

## 2023-11-02 PROCEDURE — G0398 HOME SLEEP TEST/TYPE 2 PORTA: HCPCS

## 2023-11-02 NOTE — PROGRESS NOTES
Ellis Montenegro  1970  arrived at Sleep Center on 11/2/2023 for set up and instruction of home sleep study with the [facility] unit.  she was instructed on proper set-up and operation of HST unit. Written instructions with set up diagram given for reference and reinforcement of verbal instruction and demonstration. she was able to return demonstration appropriately. No home environment, vision, dexterity, comprehension concerns with this patient based on completed forms and patient interactions. Patient will return unit after 2 nights as instructed.     Electronically signed by Jessica Spence on 11/2/2023 at 1:19 PM

## 2023-11-16 ENCOUNTER — HOSPITAL ENCOUNTER (OUTPATIENT)
Dept: SLEEP CENTER | Age: 53
Discharge: HOME | End: 2023-11-16
Payer: COMMERCIAL

## 2023-11-16 DIAGNOSIS — G47.10 HYPERSOMNIA: ICD-10-CM

## 2023-11-16 DIAGNOSIS — Z87.891 EX-CIGARETTE SMOKER: ICD-10-CM

## 2023-11-16 DIAGNOSIS — G47.33 OSA (OBSTRUCTIVE SLEEP APNEA): ICD-10-CM

## 2023-11-16 DIAGNOSIS — E66.01 MORBID OBESITY WITH BMI OF 45.0-49.9, ADULT (HCC): ICD-10-CM

## 2023-11-16 PROCEDURE — 99214 OFFICE O/P EST MOD 30 MIN: CPT | Performed by: INTERNAL MEDICINE

## 2023-11-16 PROCEDURE — 99211 OFF/OP EST MAY X REQ PHY/QHP: CPT

## 2023-11-16 ASSESSMENT — ENCOUNTER SYMPTOMS
ABDOMINAL PAIN: 0
COUGH: 0
BACK PAIN: 0
ABDOMINAL DISTENTION: 0
EYE ITCHING: 0
EYE DISCHARGE: 0
SHORTNESS OF BREATH: 0

## 2023-11-16 NOTE — PROGRESS NOTES
and heat intolerance. Genitourinary:  Negative for enuresis and frequency. Musculoskeletal:  Negative for arthralgias and back pain. Allergic/Immunologic: Negative for environmental allergies and food allergies. Neurological:  Negative for light-headedness and numbness. Hematological:  Negative for adenopathy. Psychiatric/Behavioral:  Negative for agitation and behavioral problems. Objective: There were no vitals taken for this visit. There is no height or weight on file to calculate BMI. 10/19/2023    12:04 PM   Sleep Medicine   Sitting and reading 3   Watching TV 2   Sitting, inactive in a public place (e.g. a theatre or a meeting) 1   As a passenger in a car for an hour without a break 2   Lying down to rest in the afternoon when circumstances permit 2   Sitting and talking to someone 0   Sitting quietly after a lunch without alcohol 1   In a car, while stopped for a few minutes in traffic 0   [place] Sleepiness Score 11   Neck circumference (Inches) 16.5     Mallampati 3    Physical Exam  Vitals reviewed. Constitutional:       Appearance: Normal appearance. HENT:      Head: Normocephalic and atraumatic. Nose: Nose normal.      Mouth/Throat:      Mouth: Mucous membranes are moist.   Eyes:      Extraocular Movements: Extraocular movements intact. Pupils: Pupils are equal, round, and reactive to light. Cardiovascular:      Rate and Rhythm: Normal rate and regular rhythm. Pulses: Normal pulses. Heart sounds: Normal heart sounds. Pulmonary:      Effort: Pulmonary effort is normal.      Breath sounds: Normal breath sounds. Abdominal:      General: Abdomen is flat. Palpations: Abdomen is soft. Musculoskeletal:         General: Normal range of motion. Cervical back: Normal range of motion and neck supple. Skin:     General: Skin is warm and dry. Neurological:      General: No focal deficit present.       Mental Status: She is alert and oriented to

## 2024-01-13 ENCOUNTER — APPOINTMENT (OUTPATIENT)
Dept: GENERAL RADIOLOGY | Age: 54
End: 2024-01-13
Payer: COMMERCIAL

## 2024-01-13 ENCOUNTER — HOSPITAL ENCOUNTER (EMERGENCY)
Age: 54
Discharge: HOME | End: 2024-01-13
Payer: COMMERCIAL

## 2024-01-13 VITALS
RESPIRATION RATE: 18 BRPM | TEMPERATURE: 98.1 F | OXYGEN SATURATION: 99 % | DIASTOLIC BLOOD PRESSURE: 92 MMHG | HEART RATE: 84 BPM | SYSTOLIC BLOOD PRESSURE: 159 MMHG
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
## 2024-01-13 DIAGNOSIS — S61.411A LACERATION OF RIGHT HAND WITHOUT FOREIGN BODY, INITIAL ENCOUNTER: Primary | ICD-10-CM

## 2024-01-13 PROCEDURE — 6370000000 HC RX 637 (ALT 250 FOR IP): Performed by: NURSE PRACTITIONER

## 2024-01-13 PROCEDURE — 99284 EMERGENCY DEPT VISIT MOD MDM: CPT

## 2024-01-13 PROCEDURE — 6360000002 HC RX W HCPCS: Performed by: NURSE PRACTITIONER

## 2024-01-13 PROCEDURE — 12001 RPR S/N/AX/GEN/TRNK 2.5CM/<: CPT

## 2024-01-13 PROCEDURE — 90715 TDAP VACCINE 7 YRS/> IM: CPT | Performed by: NURSE PRACTITIONER

## 2024-01-13 PROCEDURE — 90471 IMMUNIZATION ADMIN: CPT | Performed by: NURSE PRACTITIONER

## 2024-01-13 PROCEDURE — 73130 X-RAY EXAM OF HAND: CPT

## 2024-01-13 RX ORDER — AMOXICILLIN AND CLAVULANATE POTASSIUM 875; 125 MG/1; MG/1
1 TABLET, FILM COATED ORAL ONCE
Status: COMPLETED | OUTPATIENT
Start: 2024-01-13 | End: 2024-01-13

## 2024-01-13 RX ORDER — AMOXICILLIN AND CLAVULANATE POTASSIUM 875; 125 MG/1; MG/1
1 TABLET, FILM COATED ORAL 2 TIMES DAILY
Qty: 14 TABLET | Refills: 0 | Status: SHIPPED | OUTPATIENT
Start: 2024-01-13 | End: 2024-01-20

## 2024-01-13 RX ADMIN — AMOXICILLIN AND CLAVULANATE POTASSIUM 1 TABLET: 875; 125 TABLET, FILM COATED ORAL at 17:17

## 2024-01-13 RX ADMIN — TETANUS TOXOID, REDUCED DIPHTHERIA TOXOID AND ACELLULAR PERTUSSIS VACCINE, ADSORBED 0.5 ML: 5; 2.5; 8; 8; 2.5 SUSPENSION INTRAMUSCULAR at 17:16

## 2024-01-13 ASSESSMENT — LIFESTYLE VARIABLES
HOW MANY STANDARD DRINKS CONTAINING ALCOHOL DO YOU HAVE ON A TYPICAL DAY: PATIENT DOES NOT DRINK
HOW OFTEN DO YOU HAVE A DRINK CONTAINING ALCOHOL: NEVER

## 2024-01-14 NOTE — ED PROVIDER NOTES
[facility] EMERGENCY DEPARTMENT  EMERGENCY DEPARTMENT ENCOUNTER        Pt Name: Abigail Ye  MRN: [id number]  Birthdate 1970  Date of evaluation: 2024  Provider: JAIME MCGUIRE - CNP  PCP: Qi Harrison MD    MARIAN. I have evaluated this patient.        Triage CHIEF COMPLAINT       Chief Complaint   Patient presents with    Animal Bite     Right hand         HISTORY OF PRESENT ILLNESS      Chief Complaint: Dog bite right hand    Abigail Ye is a 53 y.o. female who presents for evaluation of a dog bite to the right hand caused by her lab today.  She states that the dog lashed out at her because it is somewhat territorial and thought she was going to take away its blanket.  She is not sure if her tetanus is up-to-date but the dog is current on all vaccinations.    Nursing Notes were all reviewed and agreed with or any disagreements were addressed in the HPI.    REVIEW OF SYSTEMS     Pertinent ROS as noted in HPI.      PAST MEDICAL HISTORY     Past Medical History:   Diagnosis Date    Acne vulgaris     Anxiety and depression     Cancer (HCC)     tonsil cancer    Family history of breast cancer     mother and 5 females in her family with CA breast;    GERD (gastroesophageal reflux disease)     HBO-Osteoradionecrosis of jaw 2021    Left lumbar radiculopathy 2016    Obesity, Class III, BMI 40-49.9 (morbid obesity) (HCC)     Osteoarthritis Knee     bilateral osteoarthritis of knees and patellofemoral-followed by Dr Garcia    Paroxysmal tachycardia unspecified     with heart rate 145; (?AVNRT)    Prediabetes 2015    Smoker        SURGICAL HISTORY     Past Surgical History:   Procedure Laterality Date     SECTION      COLONOSCOPY  2021    three colon polyps    COLONOSCOPY N/A 2021    COLONOSCOPY POLYPECTOMY SNARE/COLD BIOPSY performed by Mc Campbell MD at [facility] ASC OR    ENDOMETRIAL ABLATION

## 2024-01-15 ENCOUNTER — TELEPHONE (OUTPATIENT)
Dept: INTERNAL MEDICINE CLINIC | Age: 54
End: 2024-01-15

## 2024-01-15 NOTE — TELEPHONE ENCOUNTER
Patient called, was seen in ER on Saturday 1/13 for dog bite. Was given tetanus shot and has stitches. Was told to follow up with pcp, did not see anything available. Any possibility she can be squeezed in?

## 2024-01-22 ENCOUNTER — OFFICE VISIT (OUTPATIENT)
Dept: INTERNAL MEDICINE CLINIC | Age: 54
End: 2024-01-22
Payer: COMMERCIAL

## 2024-01-22 VITALS
BODY MASS INDEX: 51.16 KG/M2 | WEIGHT: 271 LBS | DIASTOLIC BLOOD PRESSURE: 88 MMHG | HEART RATE: 77 BPM | HEIGHT: 61 IN | SYSTOLIC BLOOD PRESSURE: 130 MMHG

## 2024-01-22 DIAGNOSIS — T14.8XXA ANIMAL BITE: Primary | ICD-10-CM

## 2024-01-22 PROCEDURE — 15853 REMOVAL SUTR/STAPL XREQ ANES: CPT | Performed by: FAMILY MEDICINE

## 2024-01-22 PROCEDURE — 99213 OFFICE O/P EST LOW 20 MIN: CPT | Performed by: FAMILY MEDICINE

## 2024-01-22 ASSESSMENT — PATIENT HEALTH QUESTIONNAIRE - PHQ9
2. FEELING DOWN, DEPRESSED OR HOPELESS: 1
SUM OF ALL RESPONSES TO PHQ9 QUESTIONS 1 & 2: 2
4. FEELING TIRED OR HAVING LITTLE ENERGY: 0
10. IF YOU CHECKED OFF ANY PROBLEMS, HOW DIFFICULT HAVE THESE PROBLEMS MADE IT FOR YOU TO DO YOUR WORK, TAKE CARE OF THINGS AT HOME, OR GET ALONG WITH OTHER PEOPLE: 0
SUM OF ALL RESPONSES TO PHQ QUESTIONS 1-9: 2
8. MOVING OR SPEAKING SO SLOWLY THAT OTHER PEOPLE COULD HAVE NOTICED. OR THE OPPOSITE, BEING SO FIGETY OR RESTLESS THAT YOU HAVE BEEN MOVING AROUND A LOT MORE THAN USUAL: 0
5. POOR APPETITE OR OVEREATING: 0
SUM OF ALL RESPONSES TO PHQ QUESTIONS 1-9: 2
SUM OF ALL RESPONSES TO PHQ QUESTIONS 1-9: 2
9. THOUGHTS THAT YOU WOULD BE BETTER OFF DEAD, OR OF HURTING YOURSELF: 0
3. TROUBLE FALLING OR STAYING ASLEEP: 0
6. FEELING BAD ABOUT YOURSELF - OR THAT YOU ARE A FAILURE OR HAVE LET YOURSELF OR YOUR FAMILY DOWN: 0
SUM OF ALL RESPONSES TO PHQ QUESTIONS 1-9: 2
7. TROUBLE CONCENTRATING ON THINGS, SUCH AS READING THE NEWSPAPER OR WATCHING TELEVISION: 0
1. LITTLE INTEREST OR PLEASURE IN DOING THINGS: 1

## 2024-01-22 ASSESSMENT — ENCOUNTER SYMPTOMS
CONSTIPATION: 0
SHORTNESS OF BREATH: 0
SORE THROAT: 0
VOMITING: 0
DIARRHEA: 0
ABDOMINAL PAIN: 0
COLOR CHANGE: 0
CHEST TIGHTNESS: 0

## 2024-01-22 NOTE — PROGRESS NOTES
Subjective:      Chief Complaint   Patient presents with    Animal Bite       HPI:  Abigail Ye is a 53 y.o. female who presents today for ER follow up.     Was seen in ER last week for a dog bite (daughter's dog).  Was bitten on R hand, was prescribed course of antibiotics, had 3 sutures placed.    Have been in place for 9 days, states sutures are irritating her hand- feels wound has closed well.  Types for work, does not do anything intensive with her hands and feels she will be able to keep them protected for the next week.     States site of injury has improved significantly, denies tenderness, drainage.    Has finished antibiotic course.       Past Medical History:   Diagnosis Date    Acne vulgaris     Anxiety and depression     Cancer (HCC)     tonsil cancer    Family history of breast cancer     mother and 5 females in her family with CA breast;    GERD (gastroesophageal reflux disease)     HBO-Osteoradionecrosis of jaw 2021    Left lumbar radiculopathy 2016    Obesity, Class III, BMI 40-49.9 (morbid obesity) ([facility])     Osteoarthritis Knee     bilateral osteoarthritis of knees and patellofemoral-followed by Dr Garcia    Paroxysmal tachycardia unspecified     with heart rate 145; (?AVNRT)    Prediabetes     Smoker         Past Surgical History:   Procedure Laterality Date     SECTION      COLONOSCOPY  2021    three colon polyps    COLONOSCOPY N/A 2021    COLONOSCOPY POLYPECTOMY SNARE/COLD BIOPSY performed by Mc Campbell MD at [facility] OR    ENDOMETRIAL ABLATION      KNEE ARTHROSCOPY Left     LAPAROSCOPIC APPENDECTOMY  2013    NICOLE AND BSO (CERVIX REMOVED)  2011    TOTAL KNEE ARTHROPLASTY Right 2023       Social History     Tobacco Use    Smoking status: Former     Current packs/day: 0.00     Average packs/day: 0.3 packs/day for 34.9 years (8.7 ttl pk-yrs)     Types: Cigarettes     Start date: 1985     Quit date: 2020     Years

## 2024-02-12 ENCOUNTER — E-VISIT (OUTPATIENT)
Dept: PRIMARY CARE CLINIC | Age: 54
End: 2024-02-12
Payer: COMMERCIAL

## 2024-02-12 DIAGNOSIS — J06.9 VIRAL UPPER RESPIRATORY TRACT INFECTION: Primary | ICD-10-CM

## 2024-02-12 PROCEDURE — 99422 OL DIG E/M SVC 11-20 MIN: CPT | Performed by: NURSE PRACTITIONER

## 2024-02-12 NOTE — PROGRESS NOTES
Abigail YESY Ye (1970) initiated an asynchronous digital communication through [employer].    HPI: per patient questionnaire     Exam: not applicable    Diagnoses and all orders for this visit:  Diagnoses and all orders for this visit:    Viral upper respiratory tract infection      Symptoms started yesterday.  Likely viral or allergy in nature.  Recommend supportive care measures for at least 7 to 10 days before starting antibiotics.  Supportive care suggestions provided    Time: EV2 - 11-20 minutes were spent on the digital evaluation and management of this patient. 18 min    Zaira Johnson, JAIME - CNP

## 2024-04-01 ENCOUNTER — OFFICE VISIT (OUTPATIENT)
Dept: CARDIOLOGY CLINIC | Age: 54
End: 2024-04-01
Payer: COMMERCIAL

## 2024-04-01 VITALS
SYSTOLIC BLOOD PRESSURE: 110 MMHG | HEART RATE: 82 BPM | HEIGHT: 60 IN | BODY MASS INDEX: 51.04 KG/M2 | WEIGHT: 260 LBS | DIASTOLIC BLOOD PRESSURE: 78 MMHG

## 2024-04-01 DIAGNOSIS — R07.9 CHEST PAIN, UNSPECIFIED TYPE: Primary | ICD-10-CM

## 2024-04-01 PROCEDURE — 99213 OFFICE O/P EST LOW 20 MIN: CPT | Performed by: INTERNAL MEDICINE

## 2024-04-01 PROCEDURE — 93000 ELECTROCARDIOGRAM COMPLETE: CPT | Performed by: INTERNAL MEDICINE

## 2024-04-01 NOTE — PROGRESS NOTES
PM     TSH:    Lab Results   Component Value Date/Time    TSH 2.01 10/05/2018 10:28 AM    TSHHS 1.210 09/09/2023 10:13 AM           Assessment & Plan:    -Precordial chest pain; has no issues  Hd knee replacement    - prediabetic Hgb A1c    - LDL  >100 diet and exercise      Mortality from the morbid obesity is very high:     Body mass index is 50.78 kg/m².          OWEN CAMPOS MD    [facility]    Please note this report has been partially produced using speech recognition software and may contain errors related to that system including errors in grammar, punctuation, and spelling, as well as words and phrases that may be inappropriate. If there are any questions or concerns please feel free to contact the dictating provider for clarification.

## 2024-04-15 ENCOUNTER — HOSPITAL ENCOUNTER (OUTPATIENT)
Age: 54
Discharge: HOME | End: 2024-04-15
Payer: COMMERCIAL

## 2024-04-15 DIAGNOSIS — R53.83 OTHER FATIGUE: ICD-10-CM

## 2024-04-15 DIAGNOSIS — R73.03 PREDIABETES: ICD-10-CM

## 2024-04-15 DIAGNOSIS — E78.49 OTHER HYPERLIPIDEMIA: ICD-10-CM

## 2024-04-15 LAB
25(OH)D3 SERPL-MCNC: 15.88 NG/ML
ALBUMIN SERPL-MCNC: 4.1 GM/DL (ref 3.4–5)
ALP BLD-CCNC: 92 IU/L (ref 40–128)
ALT SERPL-CCNC: 44 U/L (ref 10–40)
ANION GAP SERPL CALCULATED.3IONS-SCNC: 11 MMOL/L (ref 7–16)
AST SERPL-CCNC: 26 IU/L (ref 15–37)
BASOPHILS ABSOLUTE: 0 K/CU MM
BASOPHILS RELATIVE PERCENT: 0.7 % (ref 0–1)
BILIRUB SERPL-MCNC: 0.3 MG/DL (ref 0–1)
BUN SERPL-MCNC: 14 MG/DL (ref 6–23)
CALCIUM SERPL-MCNC: 8.9 MG/DL (ref 8.3–10.6)
CHLORIDE BLD-SCNC: 103 MMOL/L (ref 99–110)
CHOLEST SERPL-MCNC: 215 MG/DL
CO2: 27 MMOL/L (ref 21–32)
CREAT SERPL-MCNC: 0.8 MG/DL (ref 0.6–1.1)
DIFFERENTIAL TYPE: ABNORMAL
EOSINOPHILS ABSOLUTE: 0.1 K/CU MM
EOSINOPHILS RELATIVE PERCENT: 3.1 % (ref 0–3)
ESTIMATED AVERAGE GLUCOSE: 151 MG/DL
GFR SERPL CREATININE-BSD FRML MDRD: 88 ML/MIN/1.73M2
GLUCOSE SERPL-MCNC: 162 MG/DL (ref 70–99)
HBA1C MFR BLD: 6.9 % (ref 4.2–6.3)
HCT VFR BLD CALC: 44.6 % (ref 37–47)
HDLC SERPL-MCNC: 58 MG/DL
HEMOGLOBIN: 14.1 GM/DL (ref 12.5–16)
IMMATURE NEUTROPHIL %: 0.9 % (ref 0–0.43)
LDLC SERPL CALC-MCNC: 130 MG/DL
LYMPHOCYTES ABSOLUTE: 1.1 K/CU MM
LYMPHOCYTES RELATIVE PERCENT: 24.4 % (ref 24–44)
MCH RBC QN AUTO: 29.3 PG (ref 27–31)
MCHC RBC AUTO-ENTMCNC: 31.6 % (ref 32–36)
MCV RBC AUTO: 92.7 FL (ref 78–100)
MONOCYTES ABSOLUTE: 0.4 K/CU MM
MONOCYTES RELATIVE PERCENT: 9.5 % (ref 0–4)
NEUTROPHILS RELATIVE PERCENT: 61.4 % (ref 36–66)
NUCLEATED RBC %: 0 %
PDW BLD-RTO: 13.1 % (ref 11.7–14.9)
PLATELET # BLD: 251 K/CU MM (ref 140–440)
PMV BLD AUTO: 10 FL (ref 7.5–11.1)
POTASSIUM SERPL-SCNC: 5 MMOL/L (ref 3.5–5.1)
RBC # BLD: 4.81 M/CU MM (ref 4.2–5.4)
SEGMENTED NEUTROPHILS ABSOLUTE COUNT: 2.8 K/CU MM
SODIUM BLD-SCNC: 141 MMOL/L (ref 135–145)
TOTAL IMMATURE NEUTOROPHIL: 0.04 K/CU MM
TOTAL NUCLEATED RBC: 0 K/CU MM
TOTAL PROTEIN: 6.6 GM/DL (ref 6.4–8.2)
TRIGL SERPL-MCNC: 137 MG/DL
WBC # BLD: 4.5 K/CU MM (ref 4–10.5)

## 2024-04-15 PROCEDURE — 82306 VITAMIN D 25 HYDROXY: CPT

## 2024-04-15 PROCEDURE — 83036 HEMOGLOBIN GLYCOSYLATED A1C: CPT

## 2024-04-15 PROCEDURE — 36415 COLL VENOUS BLD VENIPUNCTURE: CPT

## 2024-04-15 PROCEDURE — 80061 LIPID PANEL: CPT

## 2024-04-15 PROCEDURE — 85025 COMPLETE CBC W/AUTO DIFF WBC: CPT

## 2024-04-15 PROCEDURE — 80053 COMPREHEN METABOLIC PANEL: CPT

## 2024-04-19 DIAGNOSIS — C09.9 TONSIL CANCER (HCC): Primary | ICD-10-CM

## 2024-04-30 ENCOUNTER — TELEPHONE (OUTPATIENT)
Dept: INTERNAL MEDICINE CLINIC | Age: 54
End: 2024-04-30

## 2024-04-30 NOTE — TELEPHONE ENCOUNTER
Got a call from the patient regarding labs and e-visit. Stated she will go ahead and go [facility] since [facility] can do walk ins. She then let me know she completed e-visit but when she went to submit it she got a message stating that based on her responses she needs to contact provider directly. Said she can be reached at [phone].

## 2024-05-01 ENCOUNTER — TELEMEDICINE (OUTPATIENT)
Dept: INTERNAL MEDICINE CLINIC | Age: 54
End: 2024-05-01

## 2024-05-01 DIAGNOSIS — J40 BRONCHITIS: Primary | ICD-10-CM

## 2024-05-01 RX ORDER — DOXYCYCLINE HYCLATE 100 MG
100 TABLET ORAL 2 TIMES DAILY
Qty: 14 TABLET | Refills: 0 | Status: SHIPPED | OUTPATIENT
Start: 2024-05-01 | End: 2024-05-08

## 2024-05-01 RX ORDER — BENZONATATE 100 MG/1
100 CAPSULE ORAL 3 TIMES DAILY PRN
Qty: 30 CAPSULE | Refills: 0 | Status: SHIPPED | OUTPATIENT
Start: 2024-05-01 | End: 2024-05-11

## 2024-05-01 RX ORDER — GUAIFENESIN 600 MG/1
600 TABLET, EXTENDED RELEASE ORAL 2 TIMES DAILY
Qty: 30 TABLET | Refills: 0 | Status: SHIPPED | OUTPATIENT
Start: 2024-05-01 | End: 2024-05-16

## 2024-05-01 SDOH — ECONOMIC STABILITY: FOOD INSECURITY: WITHIN THE PAST 12 MONTHS, THE FOOD YOU BOUGHT JUST DIDN'T LAST AND YOU DIDN'T HAVE MONEY TO GET MORE.: NEVER TRUE

## 2024-05-01 SDOH — ECONOMIC STABILITY: FOOD INSECURITY: WITHIN THE PAST 12 MONTHS, YOU WORRIED THAT YOUR FOOD WOULD RUN OUT BEFORE YOU GOT MONEY TO BUY MORE.: NEVER TRUE

## 2024-05-01 SDOH — ECONOMIC STABILITY: INCOME INSECURITY: HOW HARD IS IT FOR YOU TO PAY FOR THE VERY BASICS LIKE FOOD, HOUSING, MEDICAL CARE, AND HEATING?: NOT HARD AT ALL

## 2024-05-01 ASSESSMENT — ENCOUNTER SYMPTOMS
HEMOPTYSIS: 0
HEARTBURN: 0
COUGH: 1
SORE THROAT: 1
RHINORRHEA: 1
SHORTNESS OF BREATH: 0
WHEEZING: 0

## 2024-05-01 NOTE — PROGRESS NOTES
Pulmonary/Chest: [x] Respiratory effort normal   [x] No visualized signs of difficulty breathing or respiratory distress        [] Abnormal -      Musculoskeletal:   [x] Normal gait with no signs of ataxia         [x] Normal range of motion of neck        [] Abnormal -     Neurological:        [x] No Facial Asymmetry (Cranial nerve 7 motor function) (limited exam due to video visit)          [x] No gaze palsy        [] Abnormal -          Skin:        [x] No significant exanthematous lesions or discoloration noted on facial skin         [] Abnormal -            Psychiatric:       [x] Normal Affect [] Abnormal -        [x] No Hallucinations    Other pertinent observable physical exam findings:-         On this date 5/1/2024 I have spent 12 minutes reviewing previous notes, test results and face to face (virtual) with the patient discussing the diagnosis and importance of compliance with the treatment plan as well as documenting on the day of the visit.    --JAIME Torres - CNP

## 2024-05-02 ENCOUNTER — HOSPITAL ENCOUNTER (OUTPATIENT)
Age: 54
Discharge: HOME | End: 2024-05-02
Payer: COMMERCIAL

## 2024-05-02 DIAGNOSIS — C09.9 TONSIL CANCER (HCC): ICD-10-CM

## 2024-05-02 LAB
T3 FREE: 3.3 PG/ML (ref 2.3–4.2)
T4 FREE SERPL-MCNC: 1.31 NG/DL (ref 0.9–1.8)
TSH SERPL DL<=0.005 MIU/L-ACNC: 1.57 UIU/ML (ref 0.27–4.2)

## 2024-05-02 PROCEDURE — 84439 ASSAY OF FREE THYROXINE: CPT

## 2024-05-02 PROCEDURE — 84443 ASSAY THYROID STIM HORMONE: CPT

## 2024-05-02 PROCEDURE — 36415 COLL VENOUS BLD VENIPUNCTURE: CPT

## 2024-05-02 PROCEDURE — 84481 FREE ASSAY (FT-3): CPT

## 2024-05-09 ENCOUNTER — OFFICE VISIT (OUTPATIENT)
Dept: INTERNAL MEDICINE CLINIC | Age: 54
End: 2024-05-09
Payer: COMMERCIAL

## 2024-05-09 VITALS
OXYGEN SATURATION: 97 % | WEIGHT: 262 LBS | HEART RATE: 85 BPM | HEIGHT: 60 IN | SYSTOLIC BLOOD PRESSURE: 118 MMHG | BODY MASS INDEX: 51.44 KG/M2 | DIASTOLIC BLOOD PRESSURE: 82 MMHG

## 2024-05-09 DIAGNOSIS — E11.9 TYPE 2 DIABETES MELLITUS WITHOUT COMPLICATION, WITHOUT LONG-TERM CURRENT USE OF INSULIN (HCC): ICD-10-CM

## 2024-05-09 DIAGNOSIS — F41.9 ANXIETY AND DEPRESSION: ICD-10-CM

## 2024-05-09 DIAGNOSIS — E11.9 TYPE 2 DIABETES MELLITUS WITHOUT COMPLICATION, WITHOUT LONG-TERM CURRENT USE OF INSULIN (HCC): Primary | ICD-10-CM

## 2024-05-09 DIAGNOSIS — F32.A ANXIETY AND DEPRESSION: ICD-10-CM

## 2024-05-09 DIAGNOSIS — E55.9 VITAMIN D DEFICIENCY: ICD-10-CM

## 2024-05-09 DIAGNOSIS — E78.49 OTHER HYPERLIPIDEMIA: ICD-10-CM

## 2024-05-09 DIAGNOSIS — E66.01 CLASS 3 SEVERE OBESITY DUE TO EXCESS CALORIES WITHOUT SERIOUS COMORBIDITY WITH BODY MASS INDEX (BMI) OF 50.0 TO 59.9 IN ADULT (HCC): ICD-10-CM

## 2024-05-09 DIAGNOSIS — Z12.31 ENCOUNTER FOR SCREENING MAMMOGRAM FOR MALIGNANT NEOPLASM OF BREAST: ICD-10-CM

## 2024-05-09 DIAGNOSIS — M54.16 LEFT LUMBAR RADICULOPATHY: ICD-10-CM

## 2024-05-09 PROBLEM — R73.03 PREDIABETES: Status: RESOLVED | Noted: 2022-08-09 | Resolved: 2024-05-09

## 2024-05-09 PROCEDURE — G2211 COMPLEX E/M VISIT ADD ON: HCPCS | Performed by: FAMILY MEDICINE

## 2024-05-09 PROCEDURE — 99215 OFFICE O/P EST HI 40 MIN: CPT | Performed by: FAMILY MEDICINE

## 2024-05-09 PROCEDURE — 3044F HG A1C LEVEL LT 7.0%: CPT | Performed by: FAMILY MEDICINE

## 2024-05-09 RX ORDER — METFORMIN HYDROCHLORIDE 500 MG/1
500 TABLET, EXTENDED RELEASE ORAL
Qty: 30 TABLET | Refills: 3 | Status: SHIPPED | OUTPATIENT
Start: 2024-05-09

## 2024-05-09 RX ORDER — LANCETS 30 GAUGE
1 EACH MISCELLANEOUS DAILY
Qty: 100 EACH | Refills: 1 | Status: SHIPPED | OUTPATIENT
Start: 2024-05-09

## 2024-05-09 RX ORDER — FLUOXETINE HYDROCHLORIDE 20 MG/1
20 CAPSULE ORAL DAILY
Qty: 90 CAPSULE | Refills: 1 | Status: SHIPPED | OUTPATIENT
Start: 2024-05-09

## 2024-05-09 RX ORDER — DULOXETIN HYDROCHLORIDE 30 MG/1
CAPSULE, DELAYED RELEASE ORAL
COMMUNITY
End: 2024-05-09 | Stop reason: SDUPTHER

## 2024-05-09 RX ORDER — GLUCOSAMINE HCL/CHONDROITIN SU 500-400 MG
CAPSULE ORAL
Qty: 100 STRIP | Refills: 1 | Status: SHIPPED | OUTPATIENT
Start: 2024-05-09 | End: 2024-05-10

## 2024-05-09 RX ORDER — ATORVASTATIN CALCIUM 10 MG/1
10 TABLET, FILM COATED ORAL DAILY
Qty: 30 TABLET | Refills: 3 | Status: SHIPPED | OUTPATIENT
Start: 2024-05-09

## 2024-05-09 RX ORDER — DULOXETIN HYDROCHLORIDE 30 MG/1
CAPSULE, DELAYED RELEASE ORAL
Qty: 30 CAPSULE | Refills: 3 | Status: SHIPPED | OUTPATIENT
Start: 2024-05-09

## 2024-05-09 RX ORDER — DOXYCYCLINE HYCLATE 100 MG/1
100 CAPSULE ORAL 2 TIMES DAILY
COMMUNITY

## 2024-05-09 RX ORDER — BLOOD-GLUCOSE METER
1 KIT MISCELLANEOUS DAILY
Qty: 1 KIT | Refills: 0 | Status: SHIPPED | OUTPATIENT
Start: 2024-05-09

## 2024-05-09 RX ORDER — ERGOCALCIFEROL 1.25 MG/1
50000 CAPSULE ORAL WEEKLY
Qty: 12 CAPSULE | Refills: 0 | Status: SHIPPED | OUTPATIENT
Start: 2024-05-09

## 2024-05-09 ASSESSMENT — ENCOUNTER SYMPTOMS
SHORTNESS OF BREATH: 0
COLOR CHANGE: 0
SORE THROAT: 0
CONSTIPATION: 0
CHEST TIGHTNESS: 0
VOMITING: 0
DIARRHEA: 0
ABDOMINAL PAIN: 0

## 2024-05-09 NOTE — PROGRESS NOTES
Subjective:      Chief Complaint   Patient presents with    6 Month Follow-Up       HPI:  Abigail Ye is a 53 y.o. female who presents today for follow up of chronic conditions as listed below.    DM:  New diagnosis.      Vitamin D deficiency:  States she used to supplement, but ran out and never got more.  Does endorse fatigue.     States she takes cymbalta occasionally as needed for pain as she stopped gabapentin.  Only takes on days she is going to be on her feet for extended periods of time for work.  Does not use more than once a week.    Mood has been stable on prozac.     HLP:  Does have family history of cardiovascular disease.     Will be due for mammogram in a few months.    No acute concerns today.      Past Medical History:   Diagnosis Date    Acne vulgaris     Anxiety and depression 2018    Cancer (HCC)     tonsil cancer    Family history of breast cancer     mother and 5 females in her family with CA breast;    GERD (gastroesophageal reflux disease)     HBO-Osteoradionecrosis of jaw 2021    Left lumbar radiculopathy 2016    Obesity, Class III, BMI 40-49.9 (morbid obesity) ([facility])     Osteoarthritis Knee     bilateral osteoarthritis of knees and patellofemoral-followed by Dr Garcia    Paroxysmal tachycardia unspecified     with heart rate 145; (?AVNRT)    Prediabetes     Smoker         Past Surgical History:   Procedure Laterality Date     SECTION      COLONOSCOPY  2021    three colon polyps    COLONOSCOPY N/A 2021    COLONOSCOPY POLYPECTOMY SNARE/COLD BIOPSY performed by Mc Campbell MD at [facility] ASC OR    ENDOMETRIAL ABLATION      KNEE ARTHROSCOPY Left     LAPAROSCOPIC APPENDECTOMY  2013    NICOLE AND BSO (CERVIX REMOVED)  2011    TOTAL KNEE ARTHROPLASTY Right 2023       Social History     Tobacco Use    Smoking status: Former     Current packs/day: 0.00     Average packs/day: 0.3 packs/day for 34.9 years (8.7 ttl pk-yrs)     Types:

## 2024-05-10 RX ORDER — BLOOD-GLUCOSE METER
KIT MISCELLANEOUS
Qty: 100 EACH | Refills: 1 | Status: SHIPPED | OUTPATIENT
Start: 2024-05-10

## 2024-06-04 ENCOUNTER — PATIENT MESSAGE (OUTPATIENT)
Dept: INTERNAL MEDICINE CLINIC | Age: 54
End: 2024-06-04

## 2024-06-04 DIAGNOSIS — M25.50 ARTHRALGIA, UNSPECIFIED JOINT: ICD-10-CM

## 2024-06-04 RX ORDER — CELECOXIB 100 MG/1
CAPSULE ORAL
Qty: 30 CAPSULE | Refills: 1 | Status: SHIPPED | OUTPATIENT
Start: 2024-06-04

## 2024-06-04 NOTE — TELEPHONE ENCOUNTER
From: Abigail Ye  To: Dr. Qi Harrison  Sent: 6/4/2024 11:59 AM EDT  Subject: Fluoxetine or Duloxetine??    Prior to my last visit I was taking Fluoxetine (Prozac) 20 mg. per day for my depression/anexity.    At my last visit is appears you sent in a prescription for Duloxetine (Cymbalta) 30 mg. per day; however, I asked for refills for the Celebrex (Celecoxib) 100mg. that I take as needed for my arthritis (back and knees) if I know am going to be standing at work more than usual if we have a jury trial.    I am in the process of refilling my prescriptions on my [employer] matt and it shows I have no refills on the Prozac and 3 refills on the Cymbalta. It appears they are both used for depression. Was this medication changed for some reason or do you think the Cymbalta would be more helpful?    I still need the refills on the Celebrex, I take as needed and not on a daily basis.    Feel free to contact me at [phone].    Abigail Ye

## 2024-08-09 ENCOUNTER — HOSPITAL ENCOUNTER (OUTPATIENT)
Age: 54
Discharge: HOME | End: 2024-08-09
Payer: COMMERCIAL

## 2024-08-09 DIAGNOSIS — E11.9 TYPE 2 DIABETES MELLITUS WITHOUT COMPLICATION, WITHOUT LONG-TERM CURRENT USE OF INSULIN (HCC): ICD-10-CM

## 2024-08-09 DIAGNOSIS — E78.49 OTHER HYPERLIPIDEMIA: ICD-10-CM

## 2024-08-09 DIAGNOSIS — Z12.31 ENCOUNTER FOR SCREENING MAMMOGRAM FOR MALIGNANT NEOPLASM OF BREAST: ICD-10-CM

## 2024-08-09 DIAGNOSIS — E55.9 VITAMIN D DEFICIENCY: ICD-10-CM

## 2024-08-09 LAB
25(OH)D3 SERPL-MCNC: 39.63 NG/ML
ALBUMIN SERPL-MCNC: 4.2 GM/DL (ref 3.4–5)
ALP BLD-CCNC: 89 IU/L (ref 40–128)
ALT SERPL-CCNC: 42 U/L (ref 10–40)
ANION GAP SERPL CALCULATED.3IONS-SCNC: 10 MMOL/L (ref 7–16)
AST SERPL-CCNC: 30 IU/L (ref 15–37)
BASOPHILS ABSOLUTE: 0 K/CU MM
BASOPHILS RELATIVE PERCENT: 0.6 % (ref 0–1)
BUN SERPL-MCNC: 10 MG/DL (ref 6–23)
CALCIUM SERPL-MCNC: 9.4 MG/DL (ref 8.3–10.6)
CHLORIDE BLD-SCNC: 104 MMOL/L (ref 99–110)
CHOLEST SERPL-MCNC: 164 MG/DL
CO2: 27 MMOL/L (ref 21–32)
CREAT SERPL-MCNC: 0.7 MG/DL (ref 0.6–1.1)
DIFFERENTIAL TYPE: ABNORMAL
EOSINOPHILS ABSOLUTE: 0.2 K/CU MM
EOSINOPHILS RELATIVE PERCENT: 3.4 % (ref 0–3)
ESTIMATED AVERAGE GLUCOSE: 146 MG/DL
GFR, ESTIMATED: >90 ML/MIN/1.73M2
GLUCOSE SERPL-MCNC: 123 MG/DL (ref 70–99)
HBA1C MFR BLD: 6.7 % (ref 4.2–6.3)
HCT VFR BLD CALC: 43.4 % (ref 37–47)
HDLC SERPL-MCNC: 57 MG/DL
HEMOGLOBIN: 14.1 GM/DL (ref 12.5–16)
IMMATURE NEUTROPHIL %: 0.4 % (ref 0–0.43)
LDLC SERPL CALC-MCNC: 82 MG/DL
LYMPHOCYTES ABSOLUTE: 1.6 K/CU MM
LYMPHOCYTES RELATIVE PERCENT: 23.7 % (ref 24–44)
MCH RBC QN AUTO: 29.4 PG (ref 27–31)
MCHC RBC AUTO-ENTMCNC: 32.5 % (ref 32–36)
MCV RBC AUTO: 90.4 FL (ref 78–100)
MONOCYTES ABSOLUTE: 0.6 K/CU MM
MONOCYTES RELATIVE PERCENT: 8.1 % (ref 0–4)
NEUTROPHILS ABSOLUTE: 4.3 K/CU MM
NEUTROPHILS RELATIVE PERCENT: 63.8 % (ref 36–66)
NUCLEATED RBC %: 0 %
PDW BLD-RTO: 13 % (ref 11.7–14.9)
PLATELET # BLD: 250 K/CU MM (ref 140–440)
PMV BLD AUTO: 10.3 FL (ref 7.5–11.1)
POTASSIUM SERPL-SCNC: 5.1 MMOL/L (ref 3.5–5.1)
RBC # BLD: 4.8 M/CU MM (ref 4.2–5.4)
SODIUM BLD-SCNC: 141 MMOL/L (ref 135–145)
TOTAL IMMATURE NEUTOROPHIL: 0.03 K/CU MM
TOTAL NUCLEATED RBC: 0 K/CU MM
TOTAL PROTEIN: 7.6 GM/DL (ref 6.4–8.2)
TRIGL SERPL-MCNC: 125 MG/DL
WBC # BLD: 6.8 K/CU MM (ref 4–10.5)

## 2024-08-09 PROCEDURE — 85025 COMPLETE CBC W/AUTO DIFF WBC: CPT

## 2024-08-09 PROCEDURE — 36415 COLL VENOUS BLD VENIPUNCTURE: CPT

## 2024-08-09 PROCEDURE — 83036 HEMOGLOBIN GLYCOSYLATED A1C: CPT

## 2024-08-09 PROCEDURE — 82306 VITAMIN D 25 HYDROXY: CPT

## 2024-08-09 PROCEDURE — 80061 LIPID PANEL: CPT

## 2024-08-09 PROCEDURE — 80053 COMPREHEN METABOLIC PANEL: CPT

## 2024-08-12 ENCOUNTER — OFFICE VISIT (OUTPATIENT)
Dept: INTERNAL MEDICINE CLINIC | Age: 54
End: 2024-08-12
Payer: COMMERCIAL

## 2024-08-12 VITALS
OXYGEN SATURATION: 97 % | HEART RATE: 98 BPM | HEIGHT: 60 IN | DIASTOLIC BLOOD PRESSURE: 80 MMHG | WEIGHT: 257 LBS | BODY MASS INDEX: 50.45 KG/M2 | SYSTOLIC BLOOD PRESSURE: 126 MMHG

## 2024-08-12 DIAGNOSIS — E78.49 OTHER HYPERLIPIDEMIA: ICD-10-CM

## 2024-08-12 DIAGNOSIS — E66.01 CLASS 3 SEVERE OBESITY DUE TO EXCESS CALORIES WITHOUT SERIOUS COMORBIDITY WITH BODY MASS INDEX (BMI) OF 50.0 TO 59.9 IN ADULT (HCC): ICD-10-CM

## 2024-08-12 DIAGNOSIS — E55.9 VITAMIN D DEFICIENCY: ICD-10-CM

## 2024-08-12 DIAGNOSIS — C09.9 TONSIL CANCER (HCC): ICD-10-CM

## 2024-08-12 DIAGNOSIS — E11.9 TYPE 2 DIABETES MELLITUS WITHOUT COMPLICATION, WITHOUT LONG-TERM CURRENT USE OF INSULIN (HCC): Primary | ICD-10-CM

## 2024-08-12 DIAGNOSIS — M25.50 ARTHRALGIA, UNSPECIFIED JOINT: ICD-10-CM

## 2024-08-12 PROCEDURE — 3044F HG A1C LEVEL LT 7.0%: CPT | Performed by: FAMILY MEDICINE

## 2024-08-12 PROCEDURE — G2211 COMPLEX E/M VISIT ADD ON: HCPCS | Performed by: FAMILY MEDICINE

## 2024-08-12 PROCEDURE — 99214 OFFICE O/P EST MOD 30 MIN: CPT | Performed by: FAMILY MEDICINE

## 2024-08-12 RX ORDER — LANCETS 30 GAUGE
1 EACH MISCELLANEOUS DAILY
Qty: 100 EACH | Refills: 1 | Status: SHIPPED | OUTPATIENT
Start: 2024-08-12

## 2024-08-12 RX ORDER — CELECOXIB 100 MG/1
CAPSULE ORAL
Qty: 30 CAPSULE | Refills: 1 | Status: SHIPPED | OUTPATIENT
Start: 2024-08-12

## 2024-08-12 RX ORDER — ATORVASTATIN CALCIUM 10 MG/1
10 TABLET, FILM COATED ORAL DAILY
Qty: 90 TABLET | Refills: 1 | Status: SHIPPED | OUTPATIENT
Start: 2024-08-12

## 2024-08-12 RX ORDER — BLOOD-GLUCOSE METER
KIT MISCELLANEOUS
Qty: 100 EACH | Refills: 1 | Status: SHIPPED | OUTPATIENT
Start: 2024-08-12

## 2024-08-12 RX ORDER — METFORMIN HYDROCHLORIDE 500 MG/1
500 TABLET, EXTENDED RELEASE ORAL
Qty: 90 TABLET | Refills: 1 | Status: SHIPPED | OUTPATIENT
Start: 2024-08-12

## 2024-08-12 NOTE — PROGRESS NOTES
Subjective:      Chief Complaint   Patient presents with    Follow-up       HPI:  Abigail Ye is a 53 y.o. female who presents today for follow up of chronic conditions as listed below.    DM:  Was started on metformin at last appointment.  States she initially had some GI side effects but has improved and is now very manageable.     HLP:  Was started on lipitor at last appointment, has been tolerating without issues.       Vitamin D deficiency:  Completed her weekly supplementation, has been taking daily OTC vitamin D since then.     States she takes cymbalta occasionally as needed for pain as she stopped gabapentin.  Only takes on days she is going to be on her feet for extended periods of time for work.  Does not use more than once a week.    Is also only taking celebrex as needed.     Mood has been stable on prozac.      No acute concerns today.        Past Medical History:   Diagnosis Date    Acne vulgaris     Anxiety and depression     Cancer (HCC)     tonsil cancer    Family history of breast cancer     mother and 5 females in her family with CA breast;    GERD (gastroesophageal reflux disease)     HBO-Osteoradionecrosis of jaw 2021    Left lumbar radiculopathy 2016    Obesity, Class III, BMI 40-49.9 (morbid obesity) ([facility])     Osteoarthritis Knee     bilateral osteoarthritis of knees and patellofemoral-followed by Dr Garcia    Paroxysmal tachycardia unspecified     with heart rate 145; (?AVNRT)    Prediabetes     Smoker         Past Surgical History:   Procedure Laterality Date     SECTION      COLONOSCOPY  2021    three colon polyps    COLONOSCOPY N/A 2021    COLONOSCOPY POLYPECTOMY SNARE/COLD BIOPSY performed by Mc Campbell MD at [facility] OR    ENDOMETRIAL ABLATION      KNEE ARTHROSCOPY Left     LAPAROSCOPIC APPENDECTOMY  2013    NICOLE AND BSO (CERVIX REMOVED)  2011    TOTAL KNEE ARTHROPLASTY Right 2023       Social History     Tobacco

## 2024-08-13 ASSESSMENT — ENCOUNTER SYMPTOMS
SORE THROAT: 0
VOMITING: 0
COLOR CHANGE: 0
CHEST TIGHTNESS: 0
DIARRHEA: 0
CONSTIPATION: 0
SHORTNESS OF BREATH: 0
ABDOMINAL PAIN: 0

## 2024-08-15 ENCOUNTER — PATIENT MESSAGE (OUTPATIENT)
Dept: INTERNAL MEDICINE CLINIC | Age: 54
End: 2024-08-15

## 2024-08-20 NOTE — TELEPHONE ENCOUNTER
I contacted [facility] pharmacy today to verify if a PA is needing to be done. Pharmacy said they did need a PA completed and they will fax us the PA request. I have still not received a PA request. Manual PA has been started and faxed this date.

## 2024-08-23 ENCOUNTER — TELEPHONE (OUTPATIENT)
Dept: INTERNAL MEDICINE CLINIC | Age: 54
End: 2024-08-23

## 2024-08-23 NOTE — TELEPHONE ENCOUNTER
Patient called me back to discuss options with her Ozempic. Patient would like to have medication changed to something else more affordable and they insurance will hopefully approve. Please review.

## 2024-12-03 ENCOUNTER — PATIENT MESSAGE (OUTPATIENT)
Dept: INTERNAL MEDICINE CLINIC | Age: 54
End: 2024-12-03

## 2024-12-03 NOTE — TELEPHONE ENCOUNTER
It looks like there have been several communications regarding this (see previous encounters)- can you please look into this and verify what the current status is, and why we have not heard anything back? Please call and update the patient as well- thanks.

## 2024-12-12 RX ORDER — TIRZEPATIDE 2.5 MG/.5ML
2.5 INJECTION, SOLUTION SUBCUTANEOUS WEEKLY
Qty: 0.5 ML | Refills: 3 | Status: SHIPPED | OUTPATIENT
Start: 2024-12-12

## 2025-01-06 DIAGNOSIS — F41.9 ANXIETY AND DEPRESSION: ICD-10-CM

## 2025-01-06 DIAGNOSIS — F32.A ANXIETY AND DEPRESSION: ICD-10-CM

## 2025-01-10 ENCOUNTER — HOSPITAL ENCOUNTER (OUTPATIENT)
Age: 55
Discharge: HOME | End: 2025-01-10
Payer: COMMERCIAL

## 2025-01-10 DIAGNOSIS — E55.9 VITAMIN D DEFICIENCY: ICD-10-CM

## 2025-01-10 DIAGNOSIS — E11.9 TYPE 2 DIABETES MELLITUS WITHOUT COMPLICATION, WITHOUT LONG-TERM CURRENT USE OF INSULIN (HCC): ICD-10-CM

## 2025-01-10 DIAGNOSIS — C09.9 TONSIL CANCER (HCC): ICD-10-CM

## 2025-01-10 LAB
25(OH)D3 SERPL-MCNC: 29.5 NG/ML (ref 30–150)
ALBUMIN SERPL-MCNC: 4.1 G/DL (ref 3.4–5)
ALBUMIN/GLOB SERPL: 1.5 {RATIO} (ref 1.1–2.2)
ALP SERPL-CCNC: 79 U/L (ref 40–129)
ALT SERPL-CCNC: 52 U/L (ref 10–40)
ANION GAP SERPL CALCULATED.3IONS-SCNC: 9 MMOL/L (ref 9–17)
AST SERPL-CCNC: 36 U/L (ref 15–37)
BASOPHILS # BLD: 0.02 K/UL
BASOPHILS NFR BLD: 1 % (ref 0–1)
BILIRUB SERPL-MCNC: 0.6 MG/DL (ref 0–1)
BUN SERPL-MCNC: 12 MG/DL (ref 7–20)
CALCIUM SERPL-MCNC: 9.6 MG/DL (ref 8.3–10.6)
CHLORIDE SERPL-SCNC: 104 MMOL/L (ref 99–110)
CO2 SERPL-SCNC: 29 MMOL/L (ref 21–32)
CREAT SERPL-MCNC: 0.9 MG/DL (ref 0.6–1.1)
CREAT UR-MCNC: 329 MG/DL (ref 28–217)
EOSINOPHIL # BLD: 0.12 K/UL
EOSINOPHILS RELATIVE PERCENT: 3 % (ref 0–3)
ERYTHROCYTE [DISTWIDTH] IN BLOOD BY AUTOMATED COUNT: 12.9 % (ref 11.7–14.9)
EST. AVERAGE GLUCOSE BLD GHB EST-MCNC: 166 MG/DL
GFR, ESTIMATED: 67 ML/MIN/1.73M2
GLUCOSE SERPL-MCNC: 124 MG/DL (ref 74–99)
HBA1C MFR BLD: 7.4 % (ref 4.2–6.3)
HCT VFR BLD AUTO: 45.4 % (ref 37–47)
HGB BLD-MCNC: 14.7 G/DL (ref 12.5–16)
IMM GRANULOCYTES # BLD AUTO: 0.02 K/UL
IMM GRANULOCYTES NFR BLD: 1 %
LYMPHOCYTES NFR BLD: 1.23 K/UL
LYMPHOCYTES RELATIVE PERCENT: 28 % (ref 24–44)
MCH RBC QN AUTO: 29.2 PG (ref 27–31)
MCHC RBC AUTO-ENTMCNC: 32.4 G/DL (ref 32–36)
MCV RBC AUTO: 90.3 FL (ref 78–100)
MICROALBUMIN UR-MCNC: <1 MG/L
MICROALBUMIN/CREAT UR-RTO: ABNORMAL MCG/MG CREAT (ref 0–2)
MONOCYTES NFR BLD: 0.32 K/UL
MONOCYTES NFR BLD: 7 % (ref 0–4)
NEUTROPHILS NFR BLD: 61 % (ref 36–66)
NEUTS SEG NFR BLD: 2.66 K/UL
PLATELET # BLD AUTO: 250 K/UL (ref 140–440)
PMV BLD AUTO: 10.1 FL (ref 7.5–11.1)
POTASSIUM SERPL-SCNC: 5.2 MMOL/L (ref 3.5–5.1)
PROT SERPL-MCNC: 6.9 G/DL (ref 6.4–8.2)
RBC # BLD AUTO: 5.03 M/UL (ref 4.2–5.4)
SODIUM SERPL-SCNC: 142 MMOL/L (ref 136–145)
TSH SERPL DL<=0.05 MIU/L-ACNC: 1.63 UIU/ML (ref 0.27–4.2)
WBC OTHER # BLD: 4.4 K/UL (ref 4–10.5)

## 2025-01-10 PROCEDURE — 83036 HEMOGLOBIN GLYCOSYLATED A1C: CPT

## 2025-01-10 PROCEDURE — 82570 ASSAY OF URINE CREATININE: CPT

## 2025-01-10 PROCEDURE — 82306 VITAMIN D 25 HYDROXY: CPT

## 2025-01-10 PROCEDURE — 85025 COMPLETE CBC W/AUTO DIFF WBC: CPT

## 2025-01-10 PROCEDURE — 80053 COMPREHEN METABOLIC PANEL: CPT

## 2025-01-10 PROCEDURE — 84443 ASSAY THYROID STIM HORMONE: CPT

## 2025-01-10 PROCEDURE — 82043 UR ALBUMIN QUANTITATIVE: CPT

## 2025-01-13 ASSESSMENT — PATIENT HEALTH QUESTIONNAIRE - PHQ9
3. TROUBLE FALLING OR STAYING ASLEEP: MORE THAN HALF THE DAYS
1. LITTLE INTEREST OR PLEASURE IN DOING THINGS: MORE THAN HALF THE DAYS
1. LITTLE INTEREST OR PLEASURE IN DOING THINGS: MORE THAN HALF THE DAYS
2. FEELING DOWN, DEPRESSED OR HOPELESS: MORE THAN HALF THE DAYS
SUM OF ALL RESPONSES TO PHQ QUESTIONS 1-9: 13
3. TROUBLE FALLING OR STAYING ASLEEP: MORE THAN HALF THE DAYS
10. IF YOU CHECKED OFF ANY PROBLEMS, HOW DIFFICULT HAVE THESE PROBLEMS MADE IT FOR YOU TO DO YOUR WORK, TAKE CARE OF THINGS AT HOME, OR GET ALONG WITH OTHER PEOPLE: SOMEWHAT DIFFICULT
SUM OF ALL RESPONSES TO PHQ QUESTIONS 1-9: 13
10. IF YOU CHECKED OFF ANY PROBLEMS, HOW DIFFICULT HAVE THESE PROBLEMS MADE IT FOR YOU TO DO YOUR WORK, TAKE CARE OF THINGS AT HOME, OR GET ALONG WITH OTHER PEOPLE: SOMEWHAT DIFFICULT
6. FEELING BAD ABOUT YOURSELF - OR THAT YOU ARE A FAILURE OR HAVE LET YOURSELF OR YOUR FAMILY DOWN: SEVERAL DAYS
SUM OF ALL RESPONSES TO PHQ QUESTIONS 1-9: 13
4. FEELING TIRED OR HAVING LITTLE ENERGY: NEARLY EVERY DAY
SUM OF ALL RESPONSES TO PHQ QUESTIONS 1-9: 13
5. POOR APPETITE OR OVEREATING: MORE THAN HALF THE DAYS
9. THOUGHTS THAT YOU WOULD BE BETTER OFF DEAD, OR OF HURTING YOURSELF: NOT AT ALL
4. FEELING TIRED OR HAVING LITTLE ENERGY: NEARLY EVERY DAY
7. TROUBLE CONCENTRATING ON THINGS, SUCH AS READING THE NEWSPAPER OR WATCHING TELEVISION: SEVERAL DAYS
SUM OF ALL RESPONSES TO PHQ9 QUESTIONS 1 & 2: 4
5. POOR APPETITE OR OVEREATING: MORE THAN HALF THE DAYS
8. MOVING OR SPEAKING SO SLOWLY THAT OTHER PEOPLE COULD HAVE NOTICED. OR THE OPPOSITE, BEING SO FIGETY OR RESTLESS THAT YOU HAVE BEEN MOVING AROUND A LOT MORE THAN USUAL: NOT AT ALL
9. THOUGHTS THAT YOU WOULD BE BETTER OFF DEAD, OR OF HURTING YOURSELF: NOT AT ALL
6. FEELING BAD ABOUT YOURSELF - OR THAT YOU ARE A FAILURE OR HAVE LET YOURSELF OR YOUR FAMILY DOWN: SEVERAL DAYS
7. TROUBLE CONCENTRATING ON THINGS, SUCH AS READING THE NEWSPAPER OR WATCHING TELEVISION: SEVERAL DAYS
SUM OF ALL RESPONSES TO PHQ QUESTIONS 1-9: 13
2. FEELING DOWN, DEPRESSED OR HOPELESS: MORE THAN HALF THE DAYS
8. MOVING OR SPEAKING SO SLOWLY THAT OTHER PEOPLE COULD HAVE NOTICED. OR THE OPPOSITE - BEING SO FIDGETY OR RESTLESS THAT YOU HAVE BEEN MOVING AROUND A LOT MORE THAN USUAL: NOT AT ALL

## 2025-01-14 ENCOUNTER — OFFICE VISIT (OUTPATIENT)
Dept: INTERNAL MEDICINE CLINIC | Age: 55
End: 2025-01-14

## 2025-01-14 VITALS
RESPIRATION RATE: 16 BRPM | DIASTOLIC BLOOD PRESSURE: 82 MMHG | OXYGEN SATURATION: 99 % | HEART RATE: 83 BPM | HEIGHT: 61 IN | BODY MASS INDEX: 48.26 KG/M2 | SYSTOLIC BLOOD PRESSURE: 132 MMHG | WEIGHT: 255.6 LBS

## 2025-01-14 DIAGNOSIS — E55.9 VITAMIN D DEFICIENCY: ICD-10-CM

## 2025-01-14 DIAGNOSIS — F41.9 ANXIETY AND DEPRESSION: ICD-10-CM

## 2025-01-14 DIAGNOSIS — F32.A ANXIETY AND DEPRESSION: ICD-10-CM

## 2025-01-14 DIAGNOSIS — C09.9 TONSIL CANCER (HCC): ICD-10-CM

## 2025-01-14 DIAGNOSIS — E11.9 TYPE 2 DIABETES MELLITUS WITHOUT COMPLICATION, WITHOUT LONG-TERM CURRENT USE OF INSULIN (HCC): Primary | ICD-10-CM

## 2025-01-14 DIAGNOSIS — E78.49 OTHER HYPERLIPIDEMIA: ICD-10-CM

## 2025-01-14 RX ORDER — METFORMIN HYDROCHLORIDE 500 MG/1
500 TABLET, EXTENDED RELEASE ORAL
Qty: 90 TABLET | Refills: 1 | Status: SHIPPED | OUTPATIENT
Start: 2025-01-14

## 2025-01-14 RX ORDER — LANCETS 30 GAUGE
1 EACH MISCELLANEOUS DAILY
Qty: 100 EACH | Refills: 1 | Status: SHIPPED | OUTPATIENT
Start: 2025-01-14

## 2025-01-14 RX ORDER — BLOOD-GLUCOSE METER
KIT MISCELLANEOUS
Qty: 100 EACH | Refills: 1 | Status: SHIPPED | OUTPATIENT
Start: 2025-01-14

## 2025-01-14 SDOH — ECONOMIC STABILITY: FOOD INSECURITY: WITHIN THE PAST 12 MONTHS, THE FOOD YOU BOUGHT JUST DIDN'T LAST AND YOU DIDN'T HAVE MONEY TO GET MORE.: NEVER TRUE

## 2025-01-14 SDOH — ECONOMIC STABILITY: FOOD INSECURITY: WITHIN THE PAST 12 MONTHS, YOU WORRIED THAT YOUR FOOD WOULD RUN OUT BEFORE YOU GOT MONEY TO BUY MORE.: NEVER TRUE

## 2025-01-14 ASSESSMENT — ENCOUNTER SYMPTOMS
COLOR CHANGE: 0
DIARRHEA: 0
CONSTIPATION: 0
SHORTNESS OF BREATH: 0
CHEST TIGHTNESS: 0
SORE THROAT: 0
VOMITING: 0
ABDOMINAL PAIN: 0

## 2025-01-14 NOTE — PROGRESS NOTES
Subjective:      Chief Complaint   Patient presents with    3 Month Follow-Up     Patient presents for 3 month follow up and wellness visit       HPI:  Abigail Ye is a 54 y.o. female who presents today for follow up of chronic conditions as listed below.    Was started on mounjaro- states she just started 3-4 weeks ago.  Has been tolerating without issues.  Has noticed decreased appetite and has had weight loss since starting medication.  Has not been able to check glucose readings recently as she has been out of lancets and strips.      Admits she was eating more poorly over the holidays.     Does still endorse fatigue, but is not sure whether it is due to depression.  Feels mood has been doing well overall.     Only takes vitamin D every other day as she feels calcium causes her GI side effects (is combination vitamin).     No longer taking cymbalta or celebrex.     Has otherwise been doing well, no acute concerns today.        Past Medical History:   Diagnosis Date    Acne vulgaris     Anxiety and depression 2018    Cancer (HCC) 2020    tonsil cancer    Family history of breast cancer     mother and 5 females in her family with CA breast;    GERD (gastroesophageal reflux disease)     HBO-Osteoradionecrosis of jaw 2021    Left lumbar radiculopathy 2016    Obesity, Class III, BMI 40-49.9 (morbid obesity)     Osteoarthritis Knee     bilateral osteoarthritis of knees and patellofemoral-followed by Dr Garcia    Paroxysmal tachycardia unspecified     with heart rate 145; (?AVNRT)    Prediabetes     Smoker         Past Surgical History:   Procedure Laterality Date     SECTION      COLONOSCOPY  2021    three colon polyps    COLONOSCOPY N/A 2021    COLONOSCOPY POLYPECTOMY SNARE/COLD BIOPSY performed by Mc Campbell MD at [facility] OR    ENDOMETRIAL ABLATION      KNEE ARTHROSCOPY Left     LAPAROSCOPIC APPENDECTOMY  2013    NICOLE AND BSO (CERVIX REMOVED)  2011    TOTAL

## 2025-03-14 DIAGNOSIS — E78.49 OTHER HYPERLIPIDEMIA: ICD-10-CM

## 2025-03-14 RX ORDER — ATORVASTATIN CALCIUM 10 MG/1
10 TABLET, FILM COATED ORAL DAILY
Qty: 90 TABLET | Refills: 1 | Status: SHIPPED | OUTPATIENT
Start: 2025-03-14

## 2025-04-02 ENCOUNTER — OFFICE VISIT (OUTPATIENT)
Dept: CARDIOLOGY CLINIC | Age: 55
End: 2025-04-02
Payer: COMMERCIAL

## 2025-04-02 VITALS
HEART RATE: 80 BPM | WEIGHT: 243.8 LBS | HEIGHT: 60 IN | DIASTOLIC BLOOD PRESSURE: 74 MMHG | SYSTOLIC BLOOD PRESSURE: 124 MMHG | BODY MASS INDEX: 47.87 KG/M2

## 2025-04-02 DIAGNOSIS — R07.9 CHEST PAIN, UNSPECIFIED TYPE: Primary | ICD-10-CM

## 2025-04-02 DIAGNOSIS — E78.49 OTHER HYPERLIPIDEMIA: ICD-10-CM

## 2025-04-02 PROCEDURE — 93000 ELECTROCARDIOGRAM COMPLETE: CPT | Performed by: NURSE PRACTITIONER

## 2025-04-02 PROCEDURE — 99213 OFFICE O/P EST LOW 20 MIN: CPT | Performed by: NURSE PRACTITIONER

## 2025-04-02 ASSESSMENT — ENCOUNTER SYMPTOMS
SHORTNESS OF BREATH: 0
ORTHOPNEA: 0

## 2025-04-02 NOTE — PROGRESS NOTES
CLINICAL STAFF DOCUMENTATION    Abigail Mcfadden, ERICA     Abigail Ye  1970  [id number]    Have you had any Chest Pain recently? - N        Have you had any Shortness of Breath - no    Have you had any dizziness - No      Have you had any palpitations recently? - No      Do you have any edema - swelling in bilateral legs/fee/ankles     If Yes - CHECK TO SEE IF THE EDEMA IS PITTING  How long have they been having edema - Years  If Yes - Have they worn compression stockings No, pt states will wear them if edema gets bad enough     Vein \"LEG PROBLEM Questionnaire\"  Do you have prominent leg veins?  No   Do you have any skin discoloration?  No  Do you have any healed or active sores? No  Do you have swelling of the legs?  Yes  Do you have a family history of varicose veins? Mother  Does your profession involve pro-longed        standing or heavy lifting?    Sometimes prolonged standing   7.   Have you been fighting overweight problems? Yes  8.   Do you have restless legs?   No  9.   Do you have any night time cramps?  No  10. Do you have any of the following in your legs:         no        When did you have your last labs drawn 01/10/25  What doctor ordered Dr Harrison  Do we have the labs in their chart Yes  If we do not have the labs, ask where they were drawn     If we do not have these labs, you are retrieve these labs for the provider!    Do you need any prescriptions refilled? - No    Do you have a surgery or procedure scheduled in the near future - No      Do use tobacco products? - occasionally smokes a cigaretts.   Do you drink alcohol? - socially   Do you use any illicit drugs? - No  Caffeine? - coffee   How much caffeine? .1 cups a day       Check medication list thoroughly!!! AND RECONCILE OUTSIDE MEDICATIONS  If dose has changed change the entire order not just the MG  BE SURE TO ASK PATIENT IF THEY NEED MEDICATION REFILLS  Verify Pharmacy and update if incorrect    Add to every patient's \"wrap

## 2025-04-02 NOTE — PATIENT INSTRUCTIONS
Thank you for allowing us to care for you today!   We want to ensure we can follow your treatment plan and we strive to give you the best outcomes and experience possible.   If you ever have a life threatening emergency and call 911 - for an ambulance (EMS)  REMEMBER  Our providers can only care for you at:   [facility] or [facility]   Even if you have someone take you or you drive yourself we can only care for you in a [facility] facility. Our providers are not setup at the other healthcare locations!    PLEASE CALL OUR OFFICE DURING NORMAL BUSINESS HOURS  Monday through Friday 8 am to 5 pm  AFTER HOURS the physician on-call cannot help with scheduling, rescheduling, procedure instruction questions or any type of medication need or issue.  [facility] P:[phone] - [facility] P:[phone] - [facility] P:[phone]      If you receive a survey:  We would appreciate you taking the time to share your experience concerning your provider visit in the office.    These surveys are confidential!  We are eager to improve and are counting on you to share your feedback so we can ensure you get the best care possible.

## 2025-04-10 ENCOUNTER — HOSPITAL ENCOUNTER (OUTPATIENT)
Dept: LAB | Age: 55
Discharge: HOME | End: 2025-04-10
Payer: COMMERCIAL

## 2025-04-10 DIAGNOSIS — E78.49 OTHER HYPERLIPIDEMIA: ICD-10-CM

## 2025-04-10 DIAGNOSIS — E11.9 TYPE 2 DIABETES MELLITUS WITHOUT COMPLICATION, WITHOUT LONG-TERM CURRENT USE OF INSULIN: ICD-10-CM

## 2025-04-10 DIAGNOSIS — E55.9 VITAMIN D DEFICIENCY: ICD-10-CM

## 2025-04-10 DIAGNOSIS — C09.9 TONSIL CANCER (HCC): ICD-10-CM

## 2025-04-10 LAB
25(OH)D3 SERPL-MCNC: 49.5 NG/ML (ref 30–150)
ALBUMIN SERPL-MCNC: 4.1 G/DL (ref 3.4–5)
ALBUMIN/GLOB SERPL: 1.5 {RATIO} (ref 1.1–2.2)
ALP SERPL-CCNC: 72 U/L (ref 40–129)
ALT SERPL-CCNC: 39 U/L (ref 10–40)
ANION GAP SERPL CALCULATED.3IONS-SCNC: 10 MMOL/L (ref 9–17)
AST SERPL-CCNC: 27 U/L (ref 15–37)
BASOPHILS # BLD: 0.05 K/UL
BASOPHILS NFR BLD: 1 % (ref 0–1)
BILIRUB SERPL-MCNC: 0.5 MG/DL (ref 0–1)
BUN SERPL-MCNC: 9 MG/DL (ref 7–20)
CALCIUM SERPL-MCNC: 9.3 MG/DL (ref 8.3–10.6)
CHLORIDE SERPL-SCNC: 103 MMOL/L (ref 99–110)
CHOLEST SERPL-MCNC: 134 MG/DL (ref 125–199)
CO2 SERPL-SCNC: 27 MMOL/L (ref 21–32)
CREAT SERPL-MCNC: 1 MG/DL (ref 0.6–1.1)
EOSINOPHIL # BLD: 0.21 K/UL
EOSINOPHILS RELATIVE PERCENT: 4 % (ref 0–3)
ERYTHROCYTE [DISTWIDTH] IN BLOOD BY AUTOMATED COUNT: 13.2 % (ref 11.7–14.9)
EST. AVERAGE GLUCOSE BLD GHB EST-MCNC: 121 MG/DL
GFR, ESTIMATED: 58 ML/MIN/1.73M2
GLUCOSE SERPL-MCNC: 107 MG/DL (ref 74–99)
HBA1C MFR BLD: 5.8 % (ref 4.2–6.3)
HCT VFR BLD AUTO: 45.2 % (ref 37–47)
HDLC SERPL-MCNC: 56 MG/DL
HGB BLD-MCNC: 14.8 G/DL (ref 12.5–16)
IMM GRANULOCYTES # BLD AUTO: 0.02 K/UL
IMM GRANULOCYTES NFR BLD: 0 %
LDLC SERPL CALC-MCNC: 61 MG/DL
LYMPHOCYTES NFR BLD: 1.1 K/UL
LYMPHOCYTES RELATIVE PERCENT: 21 % (ref 24–44)
MCH RBC QN AUTO: 29.7 PG (ref 27–31)
MCHC RBC AUTO-ENTMCNC: 32.7 G/DL (ref 32–36)
MCV RBC AUTO: 90.8 FL (ref 78–100)
MONOCYTES NFR BLD: 0.46 K/UL
MONOCYTES NFR BLD: 9 % (ref 0–4)
NEUTROPHILS NFR BLD: 66 % (ref 36–66)
NEUTS SEG NFR BLD: 3.53 K/UL
PLATELET # BLD AUTO: 249 K/UL (ref 140–440)
PMV BLD AUTO: 10.8 FL (ref 7.5–11.1)
POTASSIUM SERPL-SCNC: 4.9 MMOL/L (ref 3.5–5.1)
PROT SERPL-MCNC: 6.8 G/DL (ref 6.4–8.2)
RBC # BLD AUTO: 4.98 M/UL (ref 4.2–5.4)
SODIUM SERPL-SCNC: 140 MMOL/L (ref 136–145)
TRIGL SERPL-MCNC: 89 MG/DL
TSH SERPL DL<=0.05 MIU/L-ACNC: 2.23 UIU/ML (ref 0.27–4.2)
WBC OTHER # BLD: 5.4 K/UL (ref 4–10.5)

## 2025-04-10 PROCEDURE — 80053 COMPREHEN METABOLIC PANEL: CPT

## 2025-04-10 PROCEDURE — 80061 LIPID PANEL: CPT

## 2025-04-10 PROCEDURE — 85025 COMPLETE CBC W/AUTO DIFF WBC: CPT

## 2025-04-10 PROCEDURE — 84443 ASSAY THYROID STIM HORMONE: CPT

## 2025-04-10 PROCEDURE — 83036 HEMOGLOBIN GLYCOSYLATED A1C: CPT

## 2025-04-10 PROCEDURE — 82306 VITAMIN D 25 HYDROXY: CPT

## 2025-04-14 ENCOUNTER — OFFICE VISIT (OUTPATIENT)
Dept: INTERNAL MEDICINE CLINIC | Age: 55
End: 2025-04-14
Payer: COMMERCIAL

## 2025-04-14 VITALS
OXYGEN SATURATION: 98 % | WEIGHT: 240 LBS | HEART RATE: 89 BPM | DIASTOLIC BLOOD PRESSURE: 70 MMHG | BODY MASS INDEX: 46.87 KG/M2 | SYSTOLIC BLOOD PRESSURE: 112 MMHG

## 2025-04-14 DIAGNOSIS — E11.9 TYPE 2 DIABETES MELLITUS WITHOUT COMPLICATION, WITHOUT LONG-TERM CURRENT USE OF INSULIN: Primary | ICD-10-CM

## 2025-04-14 DIAGNOSIS — E66.813 CLASS 3 SEVERE OBESITY DUE TO EXCESS CALORIES WITHOUT SERIOUS COMORBIDITY WITH BODY MASS INDEX (BMI) OF 45.0 TO 49.9 IN ADULT: ICD-10-CM

## 2025-04-14 DIAGNOSIS — M54.16 LEFT LUMBAR RADICULOPATHY: ICD-10-CM

## 2025-04-14 DIAGNOSIS — F32.A ANXIETY AND DEPRESSION: ICD-10-CM

## 2025-04-14 DIAGNOSIS — E78.49 OTHER HYPERLIPIDEMIA: ICD-10-CM

## 2025-04-14 DIAGNOSIS — F41.9 ANXIETY AND DEPRESSION: ICD-10-CM

## 2025-04-14 DIAGNOSIS — C09.9 TONSIL CANCER (HCC): ICD-10-CM

## 2025-04-14 PROCEDURE — 99214 OFFICE O/P EST MOD 30 MIN: CPT | Performed by: FAMILY MEDICINE

## 2025-04-14 PROCEDURE — G2211 COMPLEX E/M VISIT ADD ON: HCPCS | Performed by: FAMILY MEDICINE

## 2025-04-14 PROCEDURE — 3044F HG A1C LEVEL LT 7.0%: CPT | Performed by: FAMILY MEDICINE

## 2025-04-14 RX ORDER — ATORVASTATIN CALCIUM 10 MG/1
10 TABLET, FILM COATED ORAL DAILY
Qty: 90 TABLET | Refills: 1 | Status: SHIPPED | OUTPATIENT
Start: 2025-04-14

## 2025-04-14 RX ORDER — METFORMIN HYDROCHLORIDE 500 MG/1
TABLET, EXTENDED RELEASE ORAL
Qty: 45 TABLET | Refills: 1 | Status: SHIPPED | OUTPATIENT
Start: 2025-04-14

## 2025-04-14 RX ORDER — METFORMIN HYDROCHLORIDE 500 MG/1
TABLET, EXTENDED RELEASE ORAL
Qty: 90 TABLET | Refills: 1 | Status: SHIPPED
Start: 2025-04-14 | End: 2025-04-14 | Stop reason: SDUPTHER

## 2025-04-14 ASSESSMENT — ENCOUNTER SYMPTOMS
DIARRHEA: 0
SHORTNESS OF BREATH: 0
COLOR CHANGE: 0
BACK PAIN: 1
CHEST TIGHTNESS: 0
SORE THROAT: 0
VOMITING: 0
CONSTIPATION: 0
ABDOMINAL PAIN: 0

## 2025-04-14 NOTE — PROGRESS NOTES
kit by Does not apply route daily Yes Qi Harrison MD   turmeric 500 MG CAPS Take 3 capsules by mouth daily Yes Sinai Watson MD   CALCIUM-VITAMIN D PO Take by mouth every other day Yes Sinai Watson MD   Multiple Vitamins-Minerals (HAIR SKIN NAILS PO) Oral  Patient not taking: Reported on 4/2/2025  Sinai Watson MD          Objective:      /70 (BP Site: Left Upper Arm, Patient Position: Sitting, BP Cuff Size: Large Adult)   Pulse 89   Wt 108.9 kg (240 lb)   SpO2 98%   BMI 46.87 kg/m²      Physical Exam  Vitals and nursing note reviewed.   Constitutional:       General: She is not in acute distress.     Appearance: Normal appearance. She is obese. She is not ill-appearing or toxic-appearing.   HENT:      Head: Normocephalic and atraumatic.      Right Ear: External ear normal.      Left Ear: External ear normal.      Nose: Nose normal.      Mouth/Throat:      Pharynx: Oropharynx is clear.   Eyes:      General: No scleral icterus.        Right eye: No discharge.         Left eye: No discharge.      Extraocular Movements: Extraocular movements intact.      Conjunctiva/sclera: Conjunctivae normal.   Cardiovascular:      Rate and Rhythm: Normal rate and regular rhythm.      Heart sounds: Normal heart sounds.   Pulmonary:      Effort: Pulmonary effort is normal.      Breath sounds: Normal breath sounds. No wheezing or rales.   Musculoskeletal:         General: No deformity.      Cervical back: Normal range of motion and neck supple. No rigidity.   Skin:     General: Skin is warm and dry.      Findings: No rash.   Neurological:      General: No focal deficit present.      Mental Status: She is alert. Mental status is at baseline.      Motor: No weakness.   Psychiatric:         Mood and Affect: Mood normal.         Behavior: Behavior normal.            Assessment / Plan:      1. Class 3 severe obesity due to excess calories without serious comorbidity with body mass index (BMI) of

## 2025-05-03 DIAGNOSIS — E11.9 TYPE 2 DIABETES MELLITUS WITHOUT COMPLICATION, WITHOUT LONG-TERM CURRENT USE OF INSULIN (HCC): ICD-10-CM

## 2025-05-05 RX ORDER — METFORMIN HYDROCHLORIDE 500 MG/1
500 TABLET, EXTENDED RELEASE ORAL
Qty: 90 TABLET | Refills: 1 | Status: SHIPPED | OUTPATIENT
Start: 2025-05-05

## 2025-05-09 DIAGNOSIS — E11.9 TYPE 2 DIABETES MELLITUS WITHOUT COMPLICATION, WITHOUT LONG-TERM CURRENT USE OF INSULIN (HCC): ICD-10-CM

## 2025-05-12 RX ORDER — TIRZEPATIDE 2.5 MG/.5ML
INJECTION, SOLUTION SUBCUTANEOUS
Qty: 4 ML | Refills: 0 | OUTPATIENT
Start: 2025-05-12

## 2025-06-22 DIAGNOSIS — E11.9 TYPE 2 DIABETES MELLITUS WITHOUT COMPLICATION, WITHOUT LONG-TERM CURRENT USE OF INSULIN (HCC): ICD-10-CM

## 2025-06-23 RX ORDER — LANCETS 28 GAUGE
EACH MISCELLANEOUS
Qty: 100 EACH | Refills: 3 | Status: SHIPPED | OUTPATIENT
Start: 2025-06-23

## 2025-07-25 DIAGNOSIS — E11.9 TYPE 2 DIABETES MELLITUS WITHOUT COMPLICATION, WITHOUT LONG-TERM CURRENT USE OF INSULIN (HCC): ICD-10-CM

## 2025-07-25 RX ORDER — TIRZEPATIDE 7.5 MG/.5ML
INJECTION, SOLUTION SUBCUTANEOUS
Qty: 2 ML | Refills: 3 | Status: SHIPPED | OUTPATIENT
Start: 2025-07-25

## 2025-07-28 ENCOUNTER — HOSPITAL ENCOUNTER (OUTPATIENT)
Age: 55
Discharge: HOME | End: 2025-07-28
Payer: COMMERCIAL

## 2025-07-28 DIAGNOSIS — E11.9 TYPE 2 DIABETES MELLITUS WITHOUT COMPLICATION, WITHOUT LONG-TERM CURRENT USE OF INSULIN (HCC): ICD-10-CM

## 2025-07-28 DIAGNOSIS — C09.9 TONSIL CANCER (HCC): ICD-10-CM

## 2025-07-28 LAB
ALBUMIN SERPL-MCNC: 4 G/DL (ref 3.4–5)
ALBUMIN/GLOB SERPL: 1.4 {RATIO} (ref 1.1–2.2)
ALP SERPL-CCNC: 88 U/L (ref 40–129)
ALT SERPL-CCNC: 29 U/L (ref 10–40)
ANION GAP SERPL CALCULATED.3IONS-SCNC: 10 MMOL/L (ref 9–17)
AST SERPL-CCNC: 22 U/L (ref 15–37)
BASOPHILS # BLD: 0.04 K/UL
BASOPHILS NFR BLD: 1 % (ref 0–1)
BILIRUB SERPL-MCNC: 0.4 MG/DL (ref 0–1)
BUN SERPL-MCNC: 8 MG/DL (ref 7–20)
CALCIUM SERPL-MCNC: 9.5 MG/DL (ref 8.3–10.6)
CHLORIDE SERPL-SCNC: 105 MMOL/L (ref 99–110)
CO2 SERPL-SCNC: 26 MMOL/L (ref 21–32)
CREAT SERPL-MCNC: 0.9 MG/DL (ref 0.6–1.1)
EOSINOPHIL # BLD: 0.23 K/UL
EOSINOPHILS RELATIVE PERCENT: 4 % (ref 0–3)
ERYTHROCYTE [DISTWIDTH] IN BLOOD BY AUTOMATED COUNT: 12.7 % (ref 11.7–14.9)
EST. AVERAGE GLUCOSE BLD GHB EST-MCNC: 114 MG/DL
GFR, ESTIMATED: 64 ML/MIN/1.73M2
GLUCOSE SERPL-MCNC: 90 MG/DL (ref 74–99)
HBA1C MFR BLD: 5.6 % (ref 4.2–6.3)
HCT VFR BLD AUTO: 46.3 % (ref 37–47)
HGB BLD-MCNC: 15 G/DL (ref 12.5–16)
IMM GRANULOCYTES # BLD AUTO: 0.02 K/UL
IMM GRANULOCYTES NFR BLD: 0 %
LYMPHOCYTES NFR BLD: 1.63 K/UL
LYMPHOCYTES RELATIVE PERCENT: 25 % (ref 24–44)
MCH RBC QN AUTO: 29.4 PG (ref 27–31)
MCHC RBC AUTO-ENTMCNC: 32.4 G/DL (ref 32–36)
MCV RBC AUTO: 90.6 FL (ref 78–100)
MONOCYTES NFR BLD: 0.4 K/UL
MONOCYTES NFR BLD: 6 % (ref 0–5)
NEUTROPHILS NFR BLD: 65 % (ref 36–66)
NEUTS SEG NFR BLD: 4.29 K/UL
PLATELET # BLD AUTO: 264 K/UL (ref 140–440)
PMV BLD AUTO: 10.4 FL (ref 7.5–11.1)
POTASSIUM SERPL-SCNC: 4.9 MMOL/L (ref 3.5–5.1)
PROT SERPL-MCNC: 6.8 G/DL (ref 6.4–8.2)
RBC # BLD AUTO: 5.11 M/UL (ref 4.2–5.4)
SODIUM SERPL-SCNC: 140 MMOL/L (ref 136–145)
TSH SERPL DL<=0.05 MIU/L-ACNC: 1.75 UIU/ML (ref 0.27–4.2)
WBC OTHER # BLD: 6.6 K/UL (ref 4–10.5)

## 2025-07-28 PROCEDURE — 80053 COMPREHEN METABOLIC PANEL: CPT

## 2025-07-28 PROCEDURE — 85025 COMPLETE CBC W/AUTO DIFF WBC: CPT

## 2025-07-28 PROCEDURE — 83036 HEMOGLOBIN GLYCOSYLATED A1C: CPT

## 2025-07-28 PROCEDURE — 84443 ASSAY THYROID STIM HORMONE: CPT

## 2025-07-31 ENCOUNTER — OFFICE VISIT (OUTPATIENT)
Dept: FAMILY MEDICINE CLINIC | Age: 55
End: 2025-07-31
Payer: COMMERCIAL

## 2025-07-31 VITALS
WEIGHT: 233 LBS | DIASTOLIC BLOOD PRESSURE: 60 MMHG | SYSTOLIC BLOOD PRESSURE: 100 MMHG | BODY MASS INDEX: 45.75 KG/M2 | OXYGEN SATURATION: 97 % | HEART RATE: 70 BPM | RESPIRATION RATE: 20 BRPM | HEIGHT: 60 IN

## 2025-07-31 DIAGNOSIS — E11.9 TYPE 2 DIABETES MELLITUS WITHOUT COMPLICATION, WITHOUT LONG-TERM CURRENT USE OF INSULIN (HCC): ICD-10-CM

## 2025-07-31 PROCEDURE — 99213 OFFICE O/P EST LOW 20 MIN: CPT | Performed by: NURSE PRACTITIONER

## 2025-07-31 PROCEDURE — 3044F HG A1C LEVEL LT 7.0%: CPT | Performed by: NURSE PRACTITIONER

## 2025-07-31 RX ORDER — TIRZEPATIDE 7.5 MG/.5ML
INJECTION, SOLUTION SUBCUTANEOUS
Qty: 2 ML | Refills: 3 | Status: CANCELLED | OUTPATIENT
Start: 2025-07-31

## 2025-07-31 ASSESSMENT — ENCOUNTER SYMPTOMS
VOMITING: 0
COUGH: 0
GASTROINTESTINAL NEGATIVE: 1
DIARRHEA: 0
RESPIRATORY NEGATIVE: 1
ABDOMINAL DISTENTION: 0
CHEST TIGHTNESS: 0
WHEEZING: 0
SHORTNESS OF BREATH: 0
CONSTIPATION: 0
NAUSEA: 0

## 2025-07-31 NOTE — PROGRESS NOTES
Subjective   Patient ID: Abigail Ye is a 54 y.o. female.    Diabetes. Well controlled. A1c down from last check. Feeling well no SE of mounjaro- losing weight.   No concerns.   Would like to stop metformin and increase mounjaro            Review of Systems   Constitutional: Negative.  Negative for fatigue.   Respiratory: Negative.  Negative for cough, chest tightness, shortness of breath and wheezing.    Cardiovascular: Negative.  Negative for chest pain, palpitations and leg swelling.   Gastrointestinal: Negative.  Negative for abdominal distention, constipation, diarrhea, nausea and vomiting.   Endocrine: Negative.  Negative for polydipsia, polyphagia and polyuria.   Genitourinary: Negative.    Musculoskeletal:  Positive for arthralgias. Back pain: knee pain.  Neurological:  Negative for weakness, light-headedness and headaches.   Psychiatric/Behavioral: Negative.  Negative for confusion.    All other systems reviewed and are negative.         Objective   Physical Exam  Vitals reviewed.   Constitutional:       Appearance: She is well-developed.   HENT:      Head: Normocephalic and atraumatic.   Eyes:      Extraocular Movements: Extraocular movements intact.   Cardiovascular:      Rate and Rhythm: Normal rate and regular rhythm.      Heart sounds: Normal heart sounds. No murmur heard.  Pulmonary:      Effort: Pulmonary effort is normal. No respiratory distress.      Breath sounds: Normal breath sounds.   Musculoskeletal:      Cervical back: Normal range of motion and neck supple.   Skin:     General: Skin is warm and dry.   Neurological:      Mental Status: She is alert and oriented to person, place, and time.   Psychiatric:         Thought Content: Thought content normal.         Judgment: Judgment normal.            Assessment      Diagnosis Orders   1. Type 2 diabetes mellitus without complication, without long-term current use of insulin ([facility])  Tirzepatide (MOUNJARO) 10 MG/0.5ML SOAJ pen

## (undated) DEVICE — FORCEPS BX L240CM JAW DIA2.8MM L CAP W/ NDL MIC MESH TOOTH

## (undated) DEVICE — THE TORRENT IRRIGATION SCOPE CONNECTOR IS USED WITH THE TORRENT IRRIGATION TUBING TO PROVIDE IRRIGATION FLUIDS SUCH AS STERILE WATER DURING GASTROINTESTINAL ENDOSCOPIC PROCEDURES WHEN USED IN CONJUNCTION WITH AN IRRIGATION PUMP (OR ELECTROSURGICAL UNIT).: Brand: TORRENT

## (undated) DEVICE — BW-412T DISP COMBO CLEANING BRUSH: Brand: SINGLE USE COMBINATION CLEANING BRUSH

## (undated) DEVICE — LINE SAMP O2 6.5FT W/FEMALE CONN F/ADULT CAPNOLINE PLUS

## (undated) DEVICE — JELLY LUBRICATING 3 GM BACTERIOSTATIC

## (undated) DEVICE — KENDALL 500 SERIES DIAPHORETIC FOAM MONITORING ELECTRODE - TEAR DROP SHAPE ( 30/PK): Brand: KENDALL

## (undated) DEVICE — LINER SUCT CANSTR 1500CC SEMI RIG W/ POR HYDROPHOBIC SHUT

## (undated) DEVICE — TUBING, SUCTION, 3/16" X 6', STRAIGHT: Brand: MEDLINE